# Patient Record
Sex: MALE | Race: WHITE | NOT HISPANIC OR LATINO
[De-identification: names, ages, dates, MRNs, and addresses within clinical notes are randomized per-mention and may not be internally consistent; named-entity substitution may affect disease eponyms.]

---

## 2017-01-12 ENCOUNTER — APPOINTMENT (OUTPATIENT)
Dept: INTERNAL MEDICINE | Facility: CLINIC | Age: 62
End: 2017-01-12

## 2017-01-12 VITALS
HEIGHT: 70 IN | HEART RATE: 76 BPM | WEIGHT: 202 LBS | DIASTOLIC BLOOD PRESSURE: 80 MMHG | RESPIRATION RATE: 12 BRPM | BODY MASS INDEX: 28.92 KG/M2 | SYSTOLIC BLOOD PRESSURE: 125 MMHG

## 2017-01-12 DIAGNOSIS — M12.20 VILLONODULAR SYNOVITIS (PIGMENTED), UNSPECIFIED SITE: ICD-10-CM

## 2017-01-13 LAB
ALBUMIN SERPL ELPH-MCNC: 4.8 G/DL
ALP BLD-CCNC: 92 U/L
ALT SERPL-CCNC: 47 U/L
ANION GAP SERPL CALC-SCNC: 16 MMOL/L
APPEARANCE: CLEAR
AST SERPL-CCNC: 27 U/L
BACTERIA: NEGATIVE
BASOPHILS # BLD AUTO: 0.03 K/UL
BASOPHILS NFR BLD AUTO: 0.6 %
BILIRUB SERPL-MCNC: 0.4 MG/DL
BILIRUBIN URINE: NEGATIVE
BLOOD URINE: NEGATIVE
BUN SERPL-MCNC: 15 MG/DL
CALCIUM SERPL-MCNC: 11.9 MG/DL
CHLORIDE SERPL-SCNC: 101 MMOL/L
CHOLEST SERPL-MCNC: 180 MG/DL
CHOLEST/HDLC SERPL: 5.8 RATIO
CO2 SERPL-SCNC: 21 MMOL/L
COLOR: YELLOW
CREAT SERPL-MCNC: 1.05 MG/DL
EOSINOPHIL # BLD AUTO: 0.3 K/UL
EOSINOPHIL NFR BLD AUTO: 6 %
GLUCOSE QUALITATIVE U: NORMAL MG/DL
GLUCOSE SERPL-MCNC: 166 MG/DL
HBA1C MFR BLD HPLC: 7 %
HCT VFR BLD CALC: 43.6 %
HDLC SERPL-MCNC: 31 MG/DL
HGB BLD-MCNC: 15 G/DL
HYALINE CASTS: 1 /LPF
IMM GRANULOCYTES NFR BLD AUTO: 0 %
KETONES URINE: NEGATIVE
LDLC SERPL CALC-MCNC: NORMAL MG/DL
LEUKOCYTE ESTERASE URINE: NEGATIVE
LYMPHOCYTES # BLD AUTO: 1.27 K/UL
LYMPHOCYTES NFR BLD AUTO: 25.3 %
MAN DIFF?: NORMAL
MCHC RBC-ENTMCNC: 31.3 PG
MCHC RBC-ENTMCNC: 34.4 GM/DL
MCV RBC AUTO: 90.8 FL
MICROSCOPIC-UA: NORMAL
MONOCYTES # BLD AUTO: 0.43 K/UL
MONOCYTES NFR BLD AUTO: 8.6 %
NEUTROPHILS # BLD AUTO: 2.98 K/UL
NEUTROPHILS NFR BLD AUTO: 59.5 %
NITRITE URINE: NEGATIVE
PH URINE: 5
PLATELET # BLD AUTO: 224 K/UL
POTASSIUM SERPL-SCNC: 4.8 MMOL/L
PROT SERPL-MCNC: 7.3 G/DL
PROTEIN URINE: NEGATIVE MG/DL
PSA SERPL-MCNC: 1.07 NG/ML
RBC # BLD: 4.8 M/UL
RBC # FLD: 13.6 %
RED BLOOD CELLS URINE: 1 /HPF
SODIUM SERPL-SCNC: 138 MMOL/L
SPECIFIC GRAVITY URINE: 1.02
SQUAMOUS EPITHELIAL CELLS: 0 /HPF
TRIGL SERPL-MCNC: 424 MG/DL
URATE SERPL-MCNC: 6.1 MG/DL
UROBILINOGEN URINE: NORMAL MG/DL
WBC # FLD AUTO: 5.01 K/UL
WHITE BLOOD CELLS URINE: 1 /HPF

## 2017-01-19 ENCOUNTER — MESSAGE (OUTPATIENT)
Age: 62
End: 2017-01-19

## 2017-02-01 ENCOUNTER — RX RENEWAL (OUTPATIENT)
Age: 62
End: 2017-02-01

## 2017-02-14 ENCOUNTER — MEDICATION RENEWAL (OUTPATIENT)
Age: 62
End: 2017-02-14

## 2017-02-16 ENCOUNTER — RX RENEWAL (OUTPATIENT)
Age: 62
End: 2017-02-16

## 2017-02-27 ENCOUNTER — APPOINTMENT (OUTPATIENT)
Dept: INTERNAL MEDICINE | Facility: CLINIC | Age: 62
End: 2017-02-27

## 2017-02-27 ENCOUNTER — OUTPATIENT (OUTPATIENT)
Dept: OUTPATIENT SERVICES | Facility: HOSPITAL | Age: 62
LOS: 1 days | End: 2017-02-27
Payer: COMMERCIAL

## 2017-02-27 VITALS
RESPIRATION RATE: 16 BRPM | HEIGHT: 69 IN | HEART RATE: 88 BPM | DIASTOLIC BLOOD PRESSURE: 80 MMHG | TEMPERATURE: 98 F | WEIGHT: 199.96 LBS | SYSTOLIC BLOOD PRESSURE: 150 MMHG

## 2017-02-27 VITALS
HEIGHT: 70 IN | DIASTOLIC BLOOD PRESSURE: 84 MMHG | RESPIRATION RATE: 12 BRPM | SYSTOLIC BLOOD PRESSURE: 138 MMHG | HEART RATE: 72 BPM | BODY MASS INDEX: 28.92 KG/M2 | WEIGHT: 202 LBS

## 2017-02-27 DIAGNOSIS — Z98.89 OTHER SPECIFIED POSTPROCEDURAL STATES: Chronic | ICD-10-CM

## 2017-02-27 DIAGNOSIS — E21.3 HYPERPARATHYROIDISM, UNSPECIFIED: ICD-10-CM

## 2017-02-27 DIAGNOSIS — E21.0 PRIMARY HYPERPARATHYROIDISM: ICD-10-CM

## 2017-02-27 DIAGNOSIS — D36.10 BENIGN NEOPLASM OF PERIPHERAL NERVES AND AUTONOMIC NERVOUS SYSTEM, UNSPECIFIED: ICD-10-CM

## 2017-02-27 LAB
BUN SERPL-MCNC: 20 MG/DL — SIGNIFICANT CHANGE UP (ref 7–23)
CALCIUM SERPL-MCNC: 11.2 MG/DL — HIGH (ref 8.4–10.5)
CHLORIDE SERPL-SCNC: 99 MMOL/L — SIGNIFICANT CHANGE UP (ref 98–107)
CO2 SERPL-SCNC: 22 MMOL/L — SIGNIFICANT CHANGE UP (ref 22–31)
CREAT SERPL-MCNC: 1.17 MG/DL — SIGNIFICANT CHANGE UP (ref 0.5–1.3)
GLUCOSE SERPL-MCNC: 202 MG/DL — HIGH (ref 70–99)
HCT VFR BLD CALC: 39.7 % — SIGNIFICANT CHANGE UP (ref 39–50)
HGB BLD-MCNC: 14.1 G/DL — SIGNIFICANT CHANGE UP (ref 13–17)
MCHC RBC-ENTMCNC: 30.8 PG — SIGNIFICANT CHANGE UP (ref 27–34)
MCHC RBC-ENTMCNC: 35.5 % — SIGNIFICANT CHANGE UP (ref 32–36)
MCV RBC AUTO: 86.7 FL — SIGNIFICANT CHANGE UP (ref 80–100)
PLATELET # BLD AUTO: 220 K/UL — SIGNIFICANT CHANGE UP (ref 150–400)
PMV BLD: 10.6 FL — SIGNIFICANT CHANGE UP (ref 7–13)
POTASSIUM SERPL-MCNC: 3.8 MMOL/L — SIGNIFICANT CHANGE UP (ref 3.5–5.3)
POTASSIUM SERPL-SCNC: 3.8 MMOL/L — SIGNIFICANT CHANGE UP (ref 3.5–5.3)
RBC # BLD: 4.58 M/UL — SIGNIFICANT CHANGE UP (ref 4.2–5.8)
RBC # FLD: 12.7 % — SIGNIFICANT CHANGE UP (ref 10.3–14.5)
SODIUM SERPL-SCNC: 137 MMOL/L — SIGNIFICANT CHANGE UP (ref 135–145)
WBC # BLD: 4.8 K/UL — SIGNIFICANT CHANGE UP (ref 3.8–10.5)
WBC # FLD AUTO: 4.8 K/UL — SIGNIFICANT CHANGE UP (ref 3.8–10.5)

## 2017-02-27 PROCEDURE — 93010 ELECTROCARDIOGRAM REPORT: CPT

## 2017-02-27 NOTE — H&P PST ADULT - PROBLEM SELECTOR PLAN 1
Parathyroidectomy 3/6/17.    CBC BMP EKG    Pt was seen for PMD clearance, requested on chart.    MONICA precautions, OR booking informed

## 2017-02-27 NOTE — H&P PST ADULT - HISTORY OF PRESENT ILLNESS
Pt. is a 62 yo male that had elevated serum calcium which has increased, dx with hyperparathyroidism. Pt now scheduled for parathyroidectomy 3/6/17.  Surgery was previously scheduled for Oct 2016, then cancelled as mass discovered on imaging left neck.  Pt reports he was x with Schwannoma of vagus nerve which is being followed. Pt now scheduled for parathyroidectomy 3/6/17. Pt. is a 62 yo male that had elevated serum calcium which has increased, dx with hyperparathyroidism. Pt now scheduled for parathyroidectomy 3/6/17.      Surgery was previously scheduled for Oct 2016, then cancelled as mass discovered on imaging left neck.  Pt reports he was dx with Schwannoma of vagus nerve which is being followed. Pt now scheduled for parathyroidectomy 3/6/17.

## 2017-02-27 NOTE — H&P PST ADULT - NEUROLOGICAL SYMPTOMS
numbness all 10 toes since 10/2016/paresthesias numbness all 10 toes since 10/2016.  Denies numbness fingers bilateral hands/paresthesias

## 2017-02-27 NOTE — H&P PST ADULT - NSANTHOSAYNRD_GEN_A_CORE
No. MONICA screening performed.  STOP BANG Legend: 0-2 = LOW Risk; 3-4 = INTERMEDIATE Risk; 5-8 = HIGH Risk

## 2017-02-27 NOTE — H&P PST ADULT - PMH
Gout    Hyperlipidemia    Hyperparathyroidism    Hypertension Gout    Hyperlipidemia    Hyperparathyroidism    Hypertension    Schwannoma  of vagus nerve

## 2017-02-27 NOTE — H&P PST ADULT - NEUROLOGICAL COMMENTS
mass discovered on imaging left neck 10/2017.  Pt reports he was x with Schwannoma of vagus nerve which is being followed. Pt now scheduled for parathyroidectomy 3/6/17. mass discovered on imaging left neck 10/2017.  Pt reports he was x with Schwannoma of vagus nerve which is being followed. Pt now scheduled for parathyroidectomy 3/6/17..

## 2017-03-02 ENCOUNTER — RESULT REVIEW (OUTPATIENT)
Age: 62
End: 2017-03-02

## 2017-03-05 ENCOUNTER — RESULT REVIEW (OUTPATIENT)
Age: 62
End: 2017-03-05

## 2017-03-06 ENCOUNTER — TRANSCRIPTION ENCOUNTER (OUTPATIENT)
Age: 62
End: 2017-03-06

## 2017-03-06 ENCOUNTER — APPOINTMENT (OUTPATIENT)
Dept: SURGERY | Facility: HOSPITAL | Age: 62
End: 2017-03-06

## 2017-03-06 ENCOUNTER — OUTPATIENT (OUTPATIENT)
Dept: OUTPATIENT SERVICES | Facility: HOSPITAL | Age: 62
LOS: 1 days | Discharge: HOME | End: 2017-03-06
Payer: COMMERCIAL

## 2017-03-06 ENCOUNTER — OTHER (OUTPATIENT)
Age: 62
End: 2017-03-06

## 2017-03-06 VITALS
HEIGHT: 70 IN | TEMPERATURE: 98 F | OXYGEN SATURATION: 98 % | DIASTOLIC BLOOD PRESSURE: 81 MMHG | RESPIRATION RATE: 14 BRPM | HEART RATE: 94 BPM | WEIGHT: 199.96 LBS | SYSTOLIC BLOOD PRESSURE: 139 MMHG

## 2017-03-06 VITALS
SYSTOLIC BLOOD PRESSURE: 142 MMHG | HEART RATE: 88 BPM | OXYGEN SATURATION: 100 % | TEMPERATURE: 98 F | DIASTOLIC BLOOD PRESSURE: 82 MMHG | RESPIRATION RATE: 15 BRPM

## 2017-03-06 DIAGNOSIS — Z98.89 OTHER SPECIFIED POSTPROCEDURAL STATES: Chronic | ICD-10-CM

## 2017-03-06 DIAGNOSIS — E21.0 PRIMARY HYPERPARATHYROIDISM: ICD-10-CM

## 2017-03-06 PROCEDURE — 60500 EXPLORE PARATHYROID GLANDS: CPT

## 2017-03-06 PROCEDURE — 60520 REMOVAL OF THYMUS GLAND: CPT | Mod: 59

## 2017-03-06 PROCEDURE — 88331 PATH CONSLTJ SURG 1 BLK 1SPC: CPT | Mod: 26

## 2017-03-06 PROCEDURE — 13132 CMPLX RPR F/C/C/M/N/AX/G/H/F: CPT | Mod: 59

## 2017-03-06 PROCEDURE — 88305 TISSUE EXAM BY PATHOLOGIST: CPT | Mod: 26

## 2017-03-06 PROCEDURE — 88307 TISSUE EXAM BY PATHOLOGIST: CPT | Mod: 26

## 2017-03-06 RX ORDER — ACETAMINOPHEN 500 MG
650 TABLET ORAL EVERY 6 HOURS
Qty: 0 | Refills: 0 | Status: DISCONTINUED | OUTPATIENT
Start: 2017-03-06 | End: 2017-03-07

## 2017-03-06 RX ORDER — SODIUM CHLORIDE 9 MG/ML
1000 INJECTION, SOLUTION INTRAVENOUS
Qty: 0 | Refills: 0 | Status: DISCONTINUED | OUTPATIENT
Start: 2017-03-06 | End: 2017-03-07

## 2017-03-06 RX ORDER — CALCIUM CARBONATE 500(1250)
1 TABLET ORAL
Qty: 0 | Refills: 0 | Status: DISCONTINUED | OUTPATIENT
Start: 2017-03-06 | End: 2017-03-07

## 2017-03-06 RX ORDER — ACETAMINOPHEN 500 MG
2 TABLET ORAL
Qty: 0 | Refills: 0 | COMMUNITY
Start: 2017-03-06

## 2017-03-06 RX ORDER — CALCIUM CARBONATE 500(1250)
1 TABLET ORAL
Qty: 0 | Refills: 0 | COMMUNITY
Start: 2017-03-06

## 2017-03-06 RX ADMIN — Medication 1 TABLET(S): at 16:55

## 2017-03-06 RX ADMIN — SODIUM CHLORIDE 50 MILLILITER(S): 9 INJECTION, SOLUTION INTRAVENOUS at 16:55

## 2017-03-06 NOTE — ASU DISCHARGE PLAN (ADULT/PEDIATRIC). - INSTRUCTIONS
Start with clear liquids and gradually increase your diet as you can, until you return to your normal diet. Call your surgeon's office later today or tomorrow to schedule a follow up appointment.

## 2017-03-06 NOTE — ASU DISCHARGE PLAN (ADULT/PEDIATRIC). - NOTIFY
Pain not relieved by Medications/Inability to Tolerate Liquids or Foods/Fever greater than 101/Swelling that continues/Bleeding that does not stop/Unable to Urinate/Numbness, tingling/Persistent Nausea and Vomiting

## 2017-03-06 NOTE — ASU DISCHARGE PLAN (ADULT/PEDIATRIC). - MEDICATION SUMMARY - MEDICATIONS TO TAKE
I will START or STAY ON the medications listed below when I get home from the hospital:    acetaminophen 325 mg oral tablet  -- 2 tab(s) by mouth every 6 hours, As needed, Moderate Pain (4 - 6)  -- Indication: For Primary hyperparathyroidism    irbesartan 300 mg oral tablet  --  by mouth once a day (at bedtime)  -- Indication: For Primary hyperparathyroidism    calcium carbonate 500 mg (200 mg elemental calcium) oral tablet, chewable  -- 1 tab(s) by mouth 4 times a day  -- Indication: For Primary hyperparathyroidism    allopurinol 300 mg oral tablet  --  by mouth once a day (at bedtime)  -- Indication: For Primary hyperparathyroidism    Crestor 20 mg oral tablet  -- 1 tab(s) by mouth once a day (at bedtime)  -- Indication: For Primary hyperparathyroidism    Vitamin D3  -- 7000 international unit(s) by mouth 2 times a day  -- Indication: For Primary hyperparathyroidism

## 2017-03-06 NOTE — ASU DISCHARGE PLAN (ADULT/PEDIATRIC). - NURSING INSTRUCTIONS
See medication reconcilliation record  You were given IV Tylenol for pain management.  Please DO NOT take tylenol for the next 6-8 hours (until _7:45pm_ ). Please do not exceed 3000mg in 24hours.  You were given Cefazolin 2000mg in OR today about 1:[id number]

## 2017-03-06 NOTE — BRIEF OPERATIVE NOTE - PROCEDURE
Parathyroid hormone (PTH) antibody assay  03/06/2017    Active  KTORTORELL  Parathyroidectomy  03/06/2017    Active  KTORTORELL

## 2017-03-06 NOTE — ASU DISCHARGE PLAN (ADULT/PEDIATRIC). - COMMENTS
Surgical Unit will call you on the next business day to follow up. [facility] is open Monday - Friday.

## 2017-03-10 LAB — SURGICAL PATHOLOGY STUDY: SIGNIFICANT CHANGE UP

## 2017-03-21 ENCOUNTER — APPOINTMENT (OUTPATIENT)
Dept: SURGERY | Facility: CLINIC | Age: 62
End: 2017-03-21

## 2017-03-22 LAB
24R-OH-CALCIDIOL SERPL-MCNC: 71.3 PG/ML
CALCIUM SERPL-MCNC: 11 MG/DL
CALCIUM SERPL-MCNC: 11 MG/DL
PARATHYROID HORMONE INTACT: 75 PG/ML

## 2017-03-24 ENCOUNTER — OTHER (OUTPATIENT)
Age: 62
End: 2017-03-24

## 2017-05-01 ENCOUNTER — RX RENEWAL (OUTPATIENT)
Age: 62
End: 2017-05-01

## 2017-05-02 ENCOUNTER — APPOINTMENT (OUTPATIENT)
Dept: INTERNAL MEDICINE | Facility: CLINIC | Age: 62
End: 2017-05-02

## 2017-05-02 VITALS
SYSTOLIC BLOOD PRESSURE: 135 MMHG | BODY MASS INDEX: 29.2 KG/M2 | HEIGHT: 70 IN | HEART RATE: 92 BPM | WEIGHT: 204 LBS | DIASTOLIC BLOOD PRESSURE: 75 MMHG | OXYGEN SATURATION: 99 %

## 2017-05-02 DIAGNOSIS — R59.9 ENLARGED LYMPH NODES, UNSPECIFIED: ICD-10-CM

## 2017-05-02 DIAGNOSIS — Z87.898 PERSONAL HISTORY OF OTHER SPECIFIED CONDITIONS: ICD-10-CM

## 2017-05-02 DIAGNOSIS — Z01.818 ENCOUNTER FOR OTHER PREPROCEDURAL EXAMINATION: ICD-10-CM

## 2017-05-03 ENCOUNTER — RESULT REVIEW (OUTPATIENT)
Age: 62
End: 2017-05-03

## 2017-05-03 LAB
25(OH)D3 SERPL-MCNC: 18.8 NG/ML
ALBUMIN SERPL ELPH-MCNC: 4.7 G/DL
ALP BLD-CCNC: 87 U/L
ALT SERPL-CCNC: 34 U/L
ANION GAP SERPL CALC-SCNC: 20 MMOL/L
AST SERPL-CCNC: 21 U/L
BASOPHILS # BLD AUTO: 0.02 K/UL
BASOPHILS NFR BLD AUTO: 0.4 %
BILIRUB SERPL-MCNC: 0.4 MG/DL
BUN SERPL-MCNC: 19 MG/DL
CALCIUM SERPL-MCNC: 10.6 MG/DL
CHLORIDE SERPL-SCNC: 101 MMOL/L
CHOLEST SERPL-MCNC: 173 MG/DL
CHOLEST/HDLC SERPL: 5.1 RATIO
CO2 SERPL-SCNC: 17 MMOL/L
CREAT SERPL-MCNC: 1.08 MG/DL
EOSINOPHIL # BLD AUTO: 0.3 K/UL
EOSINOPHIL NFR BLD AUTO: 6.7 %
GLUCOSE SERPL-MCNC: 209 MG/DL
HBA1C MFR BLD HPLC: 7.3 %
HCT VFR BLD CALC: 41.1 %
HDLC SERPL-MCNC: 34 MG/DL
HGB BLD-MCNC: 14.2 G/DL
IMM GRANULOCYTES NFR BLD AUTO: 0.2 %
LDLC SERPL CALC-MCNC: 60 MG/DL
LYMPHOCYTES # BLD AUTO: 1.56 K/UL
LYMPHOCYTES NFR BLD AUTO: 35 %
MAN DIFF?: NORMAL
MCHC RBC-ENTMCNC: 29.8 PG
MCHC RBC-ENTMCNC: 34.5 GM/DL
MCV RBC AUTO: 86.2 FL
MONOCYTES # BLD AUTO: 0.32 K/UL
MONOCYTES NFR BLD AUTO: 7.2 %
NEUTROPHILS # BLD AUTO: 2.25 K/UL
NEUTROPHILS NFR BLD AUTO: 50.5 %
PLATELET # BLD AUTO: 232 K/UL
POTASSIUM SERPL-SCNC: 4.6 MMOL/L
PROT SERPL-MCNC: 7 G/DL
RBC # BLD: 4.77 M/UL
RBC # FLD: 12.8 %
SODIUM SERPL-SCNC: 138 MMOL/L
TRIGL SERPL-MCNC: 394 MG/DL
URATE SERPL-MCNC: 4.8 MG/DL
WBC # FLD AUTO: 4.46 K/UL

## 2017-05-24 ENCOUNTER — APPOINTMENT (OUTPATIENT)
Dept: GASTROENTEROLOGY | Facility: CLINIC | Age: 62
End: 2017-05-24

## 2017-05-24 VITALS
RESPIRATION RATE: 16 BRPM | HEIGHT: 70 IN | SYSTOLIC BLOOD PRESSURE: 149 MMHG | DIASTOLIC BLOOD PRESSURE: 92 MMHG | BODY MASS INDEX: 28.35 KG/M2 | WEIGHT: 198 LBS | HEART RATE: 88 BPM | OXYGEN SATURATION: 98 %

## 2017-05-24 DIAGNOSIS — N40.0 BENIGN PROSTATIC HYPERPLASIA WITHOUT LOWER URINARY TRACT SYMPMS: ICD-10-CM

## 2017-06-07 ENCOUNTER — OUTPATIENT (OUTPATIENT)
Dept: OUTPATIENT SERVICES | Facility: HOSPITAL | Age: 62
LOS: 1 days | End: 2017-06-07
Payer: COMMERCIAL

## 2017-06-07 DIAGNOSIS — Z98.89 OTHER SPECIFIED POSTPROCEDURAL STATES: Chronic | ICD-10-CM

## 2017-06-07 DIAGNOSIS — R13.10 DYSPHAGIA, UNSPECIFIED: ICD-10-CM

## 2017-06-07 PROCEDURE — 74220 X-RAY XM ESOPHAGUS 1CNTRST: CPT

## 2017-06-07 PROCEDURE — 74220 X-RAY XM ESOPHAGUS 1CNTRST: CPT | Mod: 26

## 2017-06-20 ENCOUNTER — OTHER (OUTPATIENT)
Age: 62
End: 2017-06-20

## 2017-06-20 ENCOUNTER — APPOINTMENT (OUTPATIENT)
Dept: SURGERY | Facility: CLINIC | Age: 62
End: 2017-06-20

## 2017-06-20 ENCOUNTER — OUTPATIENT (OUTPATIENT)
Dept: OUTPATIENT SERVICES | Facility: HOSPITAL | Age: 62
LOS: 1 days | End: 2017-06-20
Payer: COMMERCIAL

## 2017-06-20 ENCOUNTER — APPOINTMENT (OUTPATIENT)
Dept: GASTROENTEROLOGY | Facility: GI CENTER | Age: 62
End: 2017-06-20

## 2017-06-20 ENCOUNTER — RESULT REVIEW (OUTPATIENT)
Age: 62
End: 2017-06-20

## 2017-06-20 DIAGNOSIS — Z98.89 OTHER SPECIFIED POSTPROCEDURAL STATES: Chronic | ICD-10-CM

## 2017-06-20 DIAGNOSIS — K21.9 GASTRO-ESOPHAGEAL REFLUX DISEASE WITHOUT ESOPHAGITIS: ICD-10-CM

## 2017-06-20 PROCEDURE — 88342 IMHCHEM/IMCYTCHM 1ST ANTB: CPT | Mod: 26

## 2017-06-20 PROCEDURE — 88305 TISSUE EXAM BY PATHOLOGIST: CPT

## 2017-06-20 PROCEDURE — 88305 TISSUE EXAM BY PATHOLOGIST: CPT | Mod: 26

## 2017-06-20 PROCEDURE — 43239 EGD BIOPSY SINGLE/MULTIPLE: CPT

## 2017-06-20 PROCEDURE — 88342 IMHCHEM/IMCYTCHM 1ST ANTB: CPT

## 2017-06-20 PROCEDURE — 88341 IMHCHEM/IMCYTCHM EA ADD ANTB: CPT | Mod: 26

## 2017-06-21 LAB — SURGICAL PATHOLOGY FINAL REPORT - CH: SIGNIFICANT CHANGE UP

## 2017-07-28 ENCOUNTER — APPOINTMENT (OUTPATIENT)
Dept: CHRONIC DISEASE MANAGEMENT | Facility: CLINIC | Age: 62
End: 2017-07-28
Payer: COMMERCIAL

## 2017-07-28 VITALS — WEIGHT: 196 LBS | BODY MASS INDEX: 28.06 KG/M2 | HEIGHT: 70 IN

## 2017-07-28 PROCEDURE — G0108 DIAB MANAGE TRN  PER INDIV: CPT

## 2017-08-02 ENCOUNTER — RX RENEWAL (OUTPATIENT)
Age: 62
End: 2017-08-02

## 2017-08-22 ENCOUNTER — APPOINTMENT (OUTPATIENT)
Dept: INTERNAL MEDICINE | Facility: CLINIC | Age: 62
End: 2017-08-22
Payer: COMMERCIAL

## 2017-08-22 VITALS
HEART RATE: 96 BPM | WEIGHT: 194 LBS | HEIGHT: 70 IN | SYSTOLIC BLOOD PRESSURE: 130 MMHG | OXYGEN SATURATION: 98 % | DIASTOLIC BLOOD PRESSURE: 80 MMHG | BODY MASS INDEX: 27.77 KG/M2

## 2017-08-22 PROCEDURE — 99214 OFFICE O/P EST MOD 30 MIN: CPT | Mod: 25

## 2017-08-22 PROCEDURE — 36415 COLL VENOUS BLD VENIPUNCTURE: CPT

## 2017-08-22 RX ORDER — SODIUM SULFATE, POTASSIUM SULFATE, MAGNESIUM SULFATE 17.5; 3.13; 1.6 G/ML; G/ML; G/ML
17.5-3.13-1.6 SOLUTION, CONCENTRATE ORAL
Qty: 1 | Refills: 0 | Status: DISCONTINUED | COMMUNITY
Start: 2017-05-24 | End: 2017-08-22

## 2017-08-23 ENCOUNTER — OUTPATIENT (OUTPATIENT)
Dept: OUTPATIENT SERVICES | Facility: HOSPITAL | Age: 62
LOS: 1 days | End: 2017-08-23
Payer: COMMERCIAL

## 2017-08-23 ENCOUNTER — RESULT REVIEW (OUTPATIENT)
Age: 62
End: 2017-08-23

## 2017-08-23 ENCOUNTER — APPOINTMENT (OUTPATIENT)
Dept: GASTROENTEROLOGY | Facility: GI CENTER | Age: 62
End: 2017-08-23
Payer: COMMERCIAL

## 2017-08-23 DIAGNOSIS — Z98.89 OTHER SPECIFIED POSTPROCEDURAL STATES: Chronic | ICD-10-CM

## 2017-08-23 DIAGNOSIS — Z12.11 ENCOUNTER FOR SCREENING FOR MALIGNANT NEOPLASM OF COLON: ICD-10-CM

## 2017-08-23 DIAGNOSIS — Z86.010 PERSONAL HISTORY OF COLONIC POLYPS: ICD-10-CM

## 2017-08-23 LAB
25(OH)D3 SERPL-MCNC: 19.1 NG/ML
ALBUMIN SERPL ELPH-MCNC: 4.8 G/DL
ALP BLD-CCNC: 80 U/L
ALT SERPL-CCNC: 27 U/L
ANION GAP SERPL CALC-SCNC: 16 MMOL/L
AST SERPL-CCNC: 20 U/L
BASOPHILS # BLD AUTO: 0.02 K/UL
BASOPHILS NFR BLD AUTO: 0.4 %
BILIRUB SERPL-MCNC: 0.4 MG/DL
BUN SERPL-MCNC: 18 MG/DL
CALCIUM SERPL-MCNC: 11.3 MG/DL
CHLORIDE SERPL-SCNC: 100 MMOL/L
CHOLEST SERPL-MCNC: 159 MG/DL
CHOLEST/HDLC SERPL: 5.1 RATIO
CO2 SERPL-SCNC: 21 MMOL/L
CREAT SERPL-MCNC: 1.09 MG/DL
EOSINOPHIL # BLD AUTO: 0.2 K/UL
EOSINOPHIL NFR BLD AUTO: 3.8 %
GLUCOSE SERPL-MCNC: 170 MG/DL
HBA1C MFR BLD HPLC: 7.5 %
HCT VFR BLD CALC: 43.1 %
HDLC SERPL-MCNC: 31 MG/DL
HGB BLD-MCNC: 14.8 G/DL
IMM GRANULOCYTES NFR BLD AUTO: 0.2 %
LDLC SERPL CALC-MCNC: NORMAL
LYMPHOCYTES # BLD AUTO: 1.2 K/UL
LYMPHOCYTES NFR BLD AUTO: 22.9 %
MAN DIFF?: NORMAL
MCHC RBC-ENTMCNC: 30.5 PG
MCHC RBC-ENTMCNC: 34.3 GM/DL
MCV RBC AUTO: 88.7 FL
MONOCYTES # BLD AUTO: 0.37 K/UL
MONOCYTES NFR BLD AUTO: 7.1 %
NEUTROPHILS # BLD AUTO: 3.43 K/UL
NEUTROPHILS NFR BLD AUTO: 65.6 %
PLATELET # BLD AUTO: 216 K/UL
POTASSIUM SERPL-SCNC: 4.3 MMOL/L
PROT SERPL-MCNC: 7.3 G/DL
RBC # BLD: 4.86 M/UL
RBC # FLD: 13.1 %
SODIUM SERPL-SCNC: 137 MMOL/L
TRIGL SERPL-MCNC: 414 MG/DL
URATE SERPL-MCNC: 5.4 MG/DL
WBC # FLD AUTO: 5.23 K/UL

## 2017-08-23 PROCEDURE — 45378 DIAGNOSTIC COLONOSCOPY: CPT | Mod: 33

## 2017-08-23 PROCEDURE — G0105: CPT

## 2017-10-30 ENCOUNTER — RX RENEWAL (OUTPATIENT)
Age: 62
End: 2017-10-30

## 2017-11-09 ENCOUNTER — APPOINTMENT (OUTPATIENT)
Dept: SURGERY | Facility: CLINIC | Age: 62
End: 2017-11-09
Payer: COMMERCIAL

## 2017-11-09 PROCEDURE — 99213 OFFICE O/P EST LOW 20 MIN: CPT | Mod: 25

## 2017-11-09 PROCEDURE — 36415 COLL VENOUS BLD VENIPUNCTURE: CPT

## 2017-11-10 LAB
24R-OH-CALCIDIOL SERPL-MCNC: 41 PG/ML
CALCIUM SERPL-MCNC: 11.7 MG/DL
CALCIUM SERPL-MCNC: 11.7 MG/DL
PARATHYROID HORMONE INTACT: 61 PG/ML

## 2017-11-27 ENCOUNTER — RX RENEWAL (OUTPATIENT)
Age: 62
End: 2017-11-27

## 2017-11-27 ENCOUNTER — FORM ENCOUNTER (OUTPATIENT)
Age: 62
End: 2017-11-27

## 2017-11-28 ENCOUNTER — OUTPATIENT (OUTPATIENT)
Dept: OUTPATIENT SERVICES | Facility: HOSPITAL | Age: 62
LOS: 1 days | End: 2017-11-28
Payer: COMMERCIAL

## 2017-11-28 ENCOUNTER — APPOINTMENT (OUTPATIENT)
Dept: ULTRASOUND IMAGING | Facility: CLINIC | Age: 62
End: 2017-11-28
Payer: COMMERCIAL

## 2017-11-28 DIAGNOSIS — Z98.89 OTHER SPECIFIED POSTPROCEDURAL STATES: Chronic | ICD-10-CM

## 2017-11-28 DIAGNOSIS — Z00.8 ENCOUNTER FOR OTHER GENERAL EXAMINATION: ICD-10-CM

## 2017-11-28 PROCEDURE — 76536 US EXAM OF HEAD AND NECK: CPT | Mod: 26

## 2017-11-28 PROCEDURE — 76536 US EXAM OF HEAD AND NECK: CPT

## 2017-12-05 ENCOUNTER — OTHER (OUTPATIENT)
Age: 62
End: 2017-12-05

## 2018-01-24 ENCOUNTER — FORM ENCOUNTER (OUTPATIENT)
Age: 63
End: 2018-01-24

## 2018-01-25 ENCOUNTER — OUTPATIENT (OUTPATIENT)
Dept: OUTPATIENT SERVICES | Facility: HOSPITAL | Age: 63
LOS: 1 days | End: 2018-01-25
Payer: COMMERCIAL

## 2018-01-25 ENCOUNTER — APPOINTMENT (OUTPATIENT)
Dept: CT IMAGING | Facility: IMAGING CENTER | Age: 63
End: 2018-01-25
Payer: COMMERCIAL

## 2018-01-25 DIAGNOSIS — Z98.89 OTHER SPECIFIED POSTPROCEDURAL STATES: Chronic | ICD-10-CM

## 2018-01-25 DIAGNOSIS — Z00.8 ENCOUNTER FOR OTHER GENERAL EXAMINATION: ICD-10-CM

## 2018-01-25 PROCEDURE — 70492 CT SFT TSUE NCK W/O & W/DYE: CPT

## 2018-01-25 PROCEDURE — 70491 CT SOFT TISSUE NECK W/DYE: CPT | Mod: 26

## 2018-01-25 PROCEDURE — 82565 ASSAY OF CREATININE: CPT

## 2018-01-29 ENCOUNTER — RX RENEWAL (OUTPATIENT)
Age: 63
End: 2018-01-29

## 2018-01-29 RX ORDER — LANCETS 28 GAUGE
EACH MISCELLANEOUS
Qty: 100 | Refills: 1 | Status: ACTIVE | COMMUNITY
Start: 2018-01-29

## 2018-01-29 RX ORDER — BLOOD-GLUCOSE METER
W/DEVICE EACH MISCELLANEOUS
Qty: 1 | Refills: 0 | Status: DISCONTINUED | COMMUNITY
Start: 2017-07-28 | End: 2018-01-29

## 2018-01-29 RX ORDER — LANCETS
EACH MISCELLANEOUS
Qty: 1 | Refills: 1 | Status: DISCONTINUED | COMMUNITY
Start: 2017-07-28 | End: 2018-01-29

## 2018-01-29 RX ORDER — BLOOD SUGAR DIAGNOSTIC
STRIP MISCELLANEOUS DAILY
Qty: 100 | Refills: 1 | Status: DISCONTINUED | COMMUNITY
Start: 2017-07-28 | End: 2018-01-29

## 2018-01-30 ENCOUNTER — OTHER (OUTPATIENT)
Age: 63
End: 2018-01-30

## 2018-01-30 ENCOUNTER — FORM ENCOUNTER (OUTPATIENT)
Age: 63
End: 2018-01-30

## 2018-01-31 ENCOUNTER — OUTPATIENT (OUTPATIENT)
Dept: OUTPATIENT SERVICES | Facility: HOSPITAL | Age: 63
LOS: 1 days | End: 2018-01-31
Payer: COMMERCIAL

## 2018-01-31 ENCOUNTER — APPOINTMENT (OUTPATIENT)
Dept: ULTRASOUND IMAGING | Facility: IMAGING CENTER | Age: 63
End: 2018-01-31
Payer: COMMERCIAL

## 2018-01-31 ENCOUNTER — APPOINTMENT (OUTPATIENT)
Dept: INTERNAL MEDICINE | Facility: CLINIC | Age: 63
End: 2018-01-31

## 2018-01-31 DIAGNOSIS — Z98.89 OTHER SPECIFIED POSTPROCEDURAL STATES: Chronic | ICD-10-CM

## 2018-01-31 DIAGNOSIS — Z00.8 ENCOUNTER FOR OTHER GENERAL EXAMINATION: ICD-10-CM

## 2018-01-31 PROCEDURE — 76536 US EXAM OF HEAD AND NECK: CPT | Mod: 26

## 2018-01-31 PROCEDURE — 76536 US EXAM OF HEAD AND NECK: CPT

## 2018-02-10 ENCOUNTER — FORM ENCOUNTER (OUTPATIENT)
Age: 63
End: 2018-02-10

## 2018-02-11 ENCOUNTER — OUTPATIENT (OUTPATIENT)
Dept: OUTPATIENT SERVICES | Facility: HOSPITAL | Age: 63
LOS: 1 days | End: 2018-02-11
Payer: COMMERCIAL

## 2018-02-11 ENCOUNTER — APPOINTMENT (OUTPATIENT)
Dept: NUCLEAR MEDICINE | Facility: IMAGING CENTER | Age: 63
End: 2018-02-11
Payer: COMMERCIAL

## 2018-02-11 DIAGNOSIS — Z98.89 OTHER SPECIFIED POSTPROCEDURAL STATES: Chronic | ICD-10-CM

## 2018-02-11 DIAGNOSIS — Z00.8 ENCOUNTER FOR OTHER GENERAL EXAMINATION: ICD-10-CM

## 2018-02-11 PROCEDURE — 78072 PARATHYRD PLANAR W/SPECT&CT: CPT

## 2018-02-11 PROCEDURE — A9512: CPT

## 2018-02-11 PROCEDURE — A9500: CPT

## 2018-02-11 PROCEDURE — 78072 PARATHYRD PLANAR W/SPECT&CT: CPT | Mod: 26

## 2018-02-15 ENCOUNTER — OTHER (OUTPATIENT)
Age: 63
End: 2018-02-15

## 2018-04-25 ENCOUNTER — APPOINTMENT (OUTPATIENT)
Dept: INTERNAL MEDICINE | Facility: CLINIC | Age: 63
End: 2018-04-25
Payer: COMMERCIAL

## 2018-04-25 ENCOUNTER — NON-APPOINTMENT (OUTPATIENT)
Age: 63
End: 2018-04-25

## 2018-04-25 VITALS
WEIGHT: 203 LBS | HEIGHT: 70 IN | SYSTOLIC BLOOD PRESSURE: 123 MMHG | BODY MASS INDEX: 29.06 KG/M2 | DIASTOLIC BLOOD PRESSURE: 70 MMHG | HEART RATE: 72 BPM | RESPIRATION RATE: 12 BRPM

## 2018-04-25 PROCEDURE — 36415 COLL VENOUS BLD VENIPUNCTURE: CPT

## 2018-04-25 PROCEDURE — 99396 PREV VISIT EST AGE 40-64: CPT | Mod: 25

## 2018-04-25 PROCEDURE — 93000 ELECTROCARDIOGRAM COMPLETE: CPT

## 2018-04-27 LAB
25(OH)D3 SERPL-MCNC: 15.5 NG/ML
ALBUMIN SERPL ELPH-MCNC: 4.8 G/DL
ALP BLD-CCNC: 78 U/L
ALT SERPL-CCNC: 37 U/L
ANION GAP SERPL CALC-SCNC: 17 MMOL/L
APPEARANCE: CLEAR
AST SERPL-CCNC: 23 U/L
BACTERIA: NEGATIVE
BASOPHILS # BLD AUTO: 0.03 K/UL
BASOPHILS NFR BLD AUTO: 0.6 %
BILIRUB SERPL-MCNC: 0.4 MG/DL
BILIRUBIN URINE: NEGATIVE
BLOOD URINE: NEGATIVE
BUN SERPL-MCNC: 23 MG/DL
CALCIUM SERPL-MCNC: 11.5 MG/DL
CALCIUM SERPL-MCNC: 11.5 MG/DL
CHLORIDE SERPL-SCNC: 99 MMOL/L
CHOLEST SERPL-MCNC: 187 MG/DL
CHOLEST/HDLC SERPL: 6.7 RATIO
CO2 SERPL-SCNC: 22 MMOL/L
COLOR: YELLOW
CREAT SERPL-MCNC: 1.25 MG/DL
CREAT SPEC-SCNC: 156 MG/DL
EOSINOPHIL # BLD AUTO: 0.26 K/UL
EOSINOPHIL NFR BLD AUTO: 4.9 %
GLUCOSE QUALITATIVE U: 1000 MG/DL
GLUCOSE SERPL-MCNC: 232 MG/DL
HBA1C MFR BLD HPLC: 9.7 %
HCT VFR BLD CALC: 40.8 %
HDLC SERPL-MCNC: 28 MG/DL
HGB BLD-MCNC: 14.7 G/DL
HYALINE CASTS: 2 /LPF
IMM GRANULOCYTES NFR BLD AUTO: 0.2 %
KETONES URINE: NEGATIVE
LDLC SERPL CALC-MCNC: NORMAL
LEUKOCYTE ESTERASE URINE: NEGATIVE
LYMPHOCYTES # BLD AUTO: 1.9 K/UL
LYMPHOCYTES NFR BLD AUTO: 35.9 %
MAGNESIUM SERPL-MCNC: 1.7 MG/DL
MAN DIFF?: NORMAL
MCHC RBC-ENTMCNC: 31.1 PG
MCHC RBC-ENTMCNC: 36 GM/DL
MCV RBC AUTO: 86.3 FL
MICROALBUMIN 24H UR DL<=1MG/L-MCNC: 2.6 MG/DL
MICROALBUMIN/CREAT 24H UR-RTO: 17 MG/G
MICROSCOPIC-UA: NORMAL
MONOCYTES # BLD AUTO: 0.32 K/UL
MONOCYTES NFR BLD AUTO: 6 %
NEUTROPHILS # BLD AUTO: 2.77 K/UL
NEUTROPHILS NFR BLD AUTO: 52.4 %
NITRITE URINE: NEGATIVE
PARATHYROID HORMONE INTACT: 68 PG/ML
PH URINE: 5
PLATELET # BLD AUTO: 220 K/UL
POTASSIUM SERPL-SCNC: 4.7 MMOL/L
PROT SERPL-MCNC: 7.4 G/DL
PROTEIN URINE: NEGATIVE MG/DL
RBC # BLD: 4.73 M/UL
RBC # FLD: 13 %
RED BLOOD CELLS URINE: 0 /HPF
SODIUM SERPL-SCNC: 138 MMOL/L
SPECIFIC GRAVITY URINE: 1.03
SQUAMOUS EPITHELIAL CELLS: 1 /HPF
TRIGL SERPL-MCNC: 541 MG/DL
TSH SERPL-ACNC: 1.31 UIU/ML
UROBILINOGEN URINE: NEGATIVE MG/DL
VIT B12 SERPL-MCNC: 397 PG/ML
WBC # FLD AUTO: 5.29 K/UL
WHITE BLOOD CELLS URINE: 2 /HPF

## 2018-05-01 ENCOUNTER — RESULT REVIEW (OUTPATIENT)
Age: 63
End: 2018-05-01

## 2018-05-03 ENCOUNTER — APPOINTMENT (OUTPATIENT)
Dept: CARDIOLOGY | Facility: CLINIC | Age: 63
End: 2018-05-03

## 2018-05-18 ENCOUNTER — TRANSCRIPTION ENCOUNTER (OUTPATIENT)
Age: 63
End: 2018-05-18

## 2018-05-29 ENCOUNTER — RX RENEWAL (OUTPATIENT)
Age: 63
End: 2018-05-29

## 2018-05-30 ENCOUNTER — RX RENEWAL (OUTPATIENT)
Age: 63
End: 2018-05-30

## 2018-05-30 RX ORDER — BLOOD SUGAR DIAGNOSTIC
STRIP MISCELLANEOUS
Qty: 200 | Refills: 1 | Status: ACTIVE | COMMUNITY
Start: 2018-05-30 | End: 1900-01-01

## 2018-05-30 RX ORDER — LANCETS
EACH MISCELLANEOUS
Qty: 200 | Refills: 1 | Status: ACTIVE | COMMUNITY
Start: 2018-05-30 | End: 1900-01-01

## 2018-05-31 ENCOUNTER — RX RENEWAL (OUTPATIENT)
Age: 63
End: 2018-05-31

## 2018-06-01 ENCOUNTER — RX RENEWAL (OUTPATIENT)
Age: 63
End: 2018-06-01

## 2018-06-14 ENCOUNTER — APPOINTMENT (OUTPATIENT)
Dept: SURGERY | Facility: CLINIC | Age: 63
End: 2018-06-14
Payer: COMMERCIAL

## 2018-06-14 PROCEDURE — 36415 COLL VENOUS BLD VENIPUNCTURE: CPT

## 2018-06-14 PROCEDURE — 99214 OFFICE O/P EST MOD 30 MIN: CPT

## 2018-06-15 LAB
24R-OH-CALCIDIOL SERPL-MCNC: 48.1 PG/ML
CALCIUM SERPL-MCNC: 10.8 MG/DL
CALCIUM SERPL-MCNC: 10.8 MG/DL
HBA1C MFR BLD HPLC: 8.6 %
PARATHYROID HORMONE INTACT: 54 PG/ML

## 2018-06-18 ENCOUNTER — RESULT REVIEW (OUTPATIENT)
Age: 63
End: 2018-06-18

## 2018-06-24 RX ORDER — CHOLECALCIFEROL (VITAMIN D3) 1250 MCG
1.25 MG CAPSULE ORAL
Qty: 12 | Refills: 0 | Status: DISCONTINUED | COMMUNITY
Start: 2018-04-27 | End: 2018-06-24

## 2018-06-25 ENCOUNTER — APPOINTMENT (OUTPATIENT)
Dept: INTERNAL MEDICINE | Facility: CLINIC | Age: 63
End: 2018-06-25
Payer: COMMERCIAL

## 2018-06-25 ENCOUNTER — RX RENEWAL (OUTPATIENT)
Age: 63
End: 2018-06-25

## 2018-06-25 VITALS
HEART RATE: 70 BPM | WEIGHT: 197 LBS | DIASTOLIC BLOOD PRESSURE: 80 MMHG | RESPIRATION RATE: 14 BRPM | SYSTOLIC BLOOD PRESSURE: 122 MMHG | BODY MASS INDEX: 28.2 KG/M2 | HEIGHT: 70 IN

## 2018-06-25 PROCEDURE — 36415 COLL VENOUS BLD VENIPUNCTURE: CPT

## 2018-06-25 PROCEDURE — 99214 OFFICE O/P EST MOD 30 MIN: CPT | Mod: 25

## 2018-06-26 LAB
ALBUMIN SERPL ELPH-MCNC: 4.9 G/DL
ALP BLD-CCNC: 70 U/L
ALT SERPL-CCNC: 28 U/L
ANION GAP SERPL CALC-SCNC: 15 MMOL/L
AST SERPL-CCNC: 21 U/L
BASOPHILS # BLD AUTO: 0.04 K/UL
BASOPHILS NFR BLD AUTO: 0.8 %
BILIRUB SERPL-MCNC: 0.3 MG/DL
BUN SERPL-MCNC: 26 MG/DL
CALCIUM SERPL-MCNC: 10.8 MG/DL
CHLORIDE SERPL-SCNC: 101 MMOL/L
CHOLEST SERPL-MCNC: 142 MG/DL
CHOLEST/HDLC SERPL: 4.9 RATIO
CO2 SERPL-SCNC: 22 MMOL/L
CREAT SERPL-MCNC: 1.18 MG/DL
EOSINOPHIL # BLD AUTO: 0.17 K/UL
EOSINOPHIL NFR BLD AUTO: 3.3 %
GLUCOSE SERPL-MCNC: 154 MG/DL
HCT VFR BLD CALC: 40.2 %
HDLC SERPL-MCNC: 29 MG/DL
HGB BLD-MCNC: 13.9 G/DL
IMM GRANULOCYTES NFR BLD AUTO: 0.2 %
LDLC SERPL CALC-MCNC: 34 MG/DL
LYMPHOCYTES # BLD AUTO: 1.25 K/UL
LYMPHOCYTES NFR BLD AUTO: 24.3 %
MAN DIFF?: NORMAL
MCHC RBC-ENTMCNC: 30.5 PG
MCHC RBC-ENTMCNC: 34.6 GM/DL
MCV RBC AUTO: 88.2 FL
MONOCYTES # BLD AUTO: 0.25 K/UL
MONOCYTES NFR BLD AUTO: 4.9 %
NEUTROPHILS # BLD AUTO: 3.43 K/UL
NEUTROPHILS NFR BLD AUTO: 66.5 %
PLATELET # BLD AUTO: 214 K/UL
POTASSIUM SERPL-SCNC: 4.2 MMOL/L
PROT SERPL-MCNC: 7.3 G/DL
RBC # BLD: 4.56 M/UL
RBC # FLD: 13.1 %
SODIUM SERPL-SCNC: 138 MMOL/L
TRIGL SERPL-MCNC: 393 MG/DL
URATE SERPL-MCNC: 5.5 MG/DL
WBC # FLD AUTO: 5.15 K/UL

## 2018-07-23 ENCOUNTER — RX RENEWAL (OUTPATIENT)
Age: 63
End: 2018-07-23

## 2018-08-06 ENCOUNTER — APPOINTMENT (OUTPATIENT)
Dept: INTERNAL MEDICINE | Facility: CLINIC | Age: 63
End: 2018-08-06
Payer: COMMERCIAL

## 2018-08-06 VITALS
DIASTOLIC BLOOD PRESSURE: 80 MMHG | RESPIRATION RATE: 13 BRPM | WEIGHT: 192 LBS | BODY MASS INDEX: 27.49 KG/M2 | SYSTOLIC BLOOD PRESSURE: 132 MMHG | HEART RATE: 80 BPM | HEIGHT: 70 IN

## 2018-08-06 PROCEDURE — 99214 OFFICE O/P EST MOD 30 MIN: CPT

## 2018-08-06 NOTE — PHYSICAL EXAM
[No Acute Distress] : no acute distress [Well-Appearing] : well-appearing [Normal Sclera/Conjunctiva] : normal sclera/conjunctiva [No JVD] : no jugular venous distention [No Respiratory Distress] : no respiratory distress  [Clear to Auscultation] : lungs were clear to auscultation bilaterally [Normal Rate] : normal rate  [Regular Rhythm] : with a regular rhythm [No Murmur] : no murmur heard [No Carotid Bruits] : no carotid bruits [No Edema] : there was no peripheral edema [Soft] : abdomen soft [Non Tender] : non-tender [No HSM] : no HSM [No Focal Deficits] : no focal deficits [Normal Affect] : the affect was normal

## 2018-08-09 PROBLEM — D36.10 BENIGN NEOPLASM OF PERIPHERAL NERVES AND AUTONOMIC NERVOUS SYSTEM, UNSPECIFIED: Chronic | Status: ACTIVE | Noted: 2017-02-27

## 2018-08-14 ENCOUNTER — OUTPATIENT (OUTPATIENT)
Dept: OUTPATIENT SERVICES | Facility: HOSPITAL | Age: 63
LOS: 1 days | End: 2018-08-14
Payer: COMMERCIAL

## 2018-08-14 VITALS
OXYGEN SATURATION: 99 % | HEART RATE: 75 BPM | SYSTOLIC BLOOD PRESSURE: 128 MMHG | HEIGHT: 68 IN | DIASTOLIC BLOOD PRESSURE: 80 MMHG | WEIGHT: 190.04 LBS | TEMPERATURE: 98 F | RESPIRATION RATE: 16 BRPM

## 2018-08-14 DIAGNOSIS — Z98.89 OTHER SPECIFIED POSTPROCEDURAL STATES: Chronic | ICD-10-CM

## 2018-08-14 DIAGNOSIS — E21.0 PRIMARY HYPERPARATHYROIDISM: ICD-10-CM

## 2018-08-14 DIAGNOSIS — E11.9 TYPE 2 DIABETES MELLITUS WITHOUT COMPLICATIONS: ICD-10-CM

## 2018-08-14 DIAGNOSIS — I10 ESSENTIAL (PRIMARY) HYPERTENSION: ICD-10-CM

## 2018-08-14 DIAGNOSIS — R06.83 SNORING: ICD-10-CM

## 2018-08-14 DIAGNOSIS — E89.2 POSTPROCEDURAL HYPOPARATHYROIDISM: Chronic | ICD-10-CM

## 2018-08-14 LAB
BUN SERPL-MCNC: 17 MG/DL — SIGNIFICANT CHANGE UP (ref 7–23)
CALCIUM SERPL-MCNC: 11.2 MG/DL — HIGH (ref 8.4–10.5)
CHLORIDE SERPL-SCNC: 99 MMOL/L — SIGNIFICANT CHANGE UP (ref 98–107)
CO2 SERPL-SCNC: 24 MMOL/L — SIGNIFICANT CHANGE UP (ref 22–31)
CREAT SERPL-MCNC: 1 MG/DL — SIGNIFICANT CHANGE UP (ref 0.5–1.3)
GLUCOSE SERPL-MCNC: 133 MG/DL — HIGH (ref 70–99)
HBA1C BLD-MCNC: 7.2 % — HIGH (ref 4–5.6)
HCT VFR BLD CALC: 40.4 % — SIGNIFICANT CHANGE UP (ref 39–50)
HGB BLD-MCNC: 14.2 G/DL — SIGNIFICANT CHANGE UP (ref 13–17)
MCHC RBC-ENTMCNC: 30 PG — SIGNIFICANT CHANGE UP (ref 27–34)
MCHC RBC-ENTMCNC: 35.1 % — SIGNIFICANT CHANGE UP (ref 32–36)
MCV RBC AUTO: 85.4 FL — SIGNIFICANT CHANGE UP (ref 80–100)
NRBC # FLD: 0 — SIGNIFICANT CHANGE UP
PLATELET # BLD AUTO: 224 K/UL — SIGNIFICANT CHANGE UP (ref 150–400)
PMV BLD: 10.1 FL — SIGNIFICANT CHANGE UP (ref 7–13)
POTASSIUM SERPL-MCNC: 4.2 MMOL/L — SIGNIFICANT CHANGE UP (ref 3.5–5.3)
POTASSIUM SERPL-SCNC: 4.2 MMOL/L — SIGNIFICANT CHANGE UP (ref 3.5–5.3)
RBC # BLD: 4.73 M/UL — SIGNIFICANT CHANGE UP (ref 4.2–5.8)
RBC # FLD: 12.4 % — SIGNIFICANT CHANGE UP (ref 10.3–14.5)
SODIUM SERPL-SCNC: 138 MMOL/L — SIGNIFICANT CHANGE UP (ref 135–145)
WBC # BLD: 4.15 K/UL — SIGNIFICANT CHANGE UP (ref 3.8–10.5)
WBC # FLD AUTO: 4.15 K/UL — SIGNIFICANT CHANGE UP (ref 3.8–10.5)

## 2018-08-14 PROCEDURE — 93010 ELECTROCARDIOGRAM REPORT: CPT

## 2018-08-14 NOTE — H&P PST ADULT - NSANTHOSAYNRD_GEN_A_CORE
never been tested/No. MONICA screening performed.  STOP BANG Legend: 0-2 = LOW Risk; 3-4 = INTERMEDIATE Risk; 5-8 = HIGH Risk

## 2018-08-14 NOTE — H&P PST ADULT - MALLAMPATI CLASS
Class III - visualization of the soft palate and the base of the uvula/on phonation Class II - visualization of the soft palate, fauces, and uvula/on phonation

## 2018-08-14 NOTE — H&P PST ADULT - RS GEN PE MLT RESP DETAILS PC
breath sounds equal/airway patent/no rales/no rhonchi/respirations non-labored/no wheezes/clear to auscultation bilaterally/good air movement

## 2018-08-14 NOTE — H&P PST ADULT - NEGATIVE ENMT SYMPTOMS
no throat pain/no vertigo/no sinus symptoms/no nasal congestion/no ear pain/no tinnitus/no hearing difficulty

## 2018-08-14 NOTE — H&P PST ADULT - FAMILY HISTORY
Father  Still living? No  Family history of non-Hodgkin's lymphoma, Age at diagnosis: Age Unknown Father  Still living? No  Family history of non-Hodgkin's lymphoma, Age at diagnosis: Age Unknown  Diabetes, Age at diagnosis: Age Unknown     Sibling  Still living? Unknown  Family history of breast cancer in sister, Age at diagnosis: Age Unknown

## 2018-08-14 NOTE — H&P PST ADULT - HISTORY OF PRESENT ILLNESS
63 year old male presents to PST for primary hyperparathyroidism.  At routine PCP, CA levels were elevated.  Pt was referred to Dr. Aranda. Pt scheduled parathyroidectomy with parathyroid hormone assay on 8/22/18. 63 year old male presents to PST for primary hyperparathyroidism.  At routine PCP, CA levels were elevated.  Pt was referred to Dr. Aranda. Pt scheduled for parathyroidectomy with parathyroid hormone assay on 8/22/18.

## 2018-08-14 NOTE — H&P PST ADULT - PMH
Diabetes  Type II, controlled, no medication  Gout    Hyperlipidemia    Hyperparathyroidism    Hypertension    Primary hyperparathyroidism    Schwannoma  of vagus nerve BPH (benign prostatic hyperplasia)    Diabetes  Type II, controlled, no medication  GERD (gastroesophageal reflux disease)    Gout    Hyperlipidemia    Hyperparathyroidism    Hypertension    Primary hyperparathyroidism    Right inguinal hernia    Schwannoma  of vagus nerve

## 2018-08-14 NOTE — H&P PST ADULT - NEUROLOGICAL DETAILS
alert and oriented x 3/cranial nerves intact/normal strength/responds to pain/responds to verbal commands

## 2018-08-14 NOTE — H&P PST ADULT - PSH
History of left inguinal hernia repair  2009  S/P Mohs surgery for basal cell carcinoma  2006  S/P subtotal parathyroidectomy  removal of one parathyroid Feb 2017

## 2018-08-14 NOTE — H&P PST ADULT - NEGATIVE CARDIOVASCULAR SYMPTOMS
no peripheral edema/no paroxysmal nocturnal dyspnea/no chest pain/no orthopnea/no palpitations/no dyspnea on exertion

## 2018-08-14 NOTE — H&P PST ADULT - PROBLEM SELECTOR PLAN 1
Pt scheduled for parathyroidectomy with parathyroid hormone assay on 8/22/18.  Pre-op instructions given, patient verbalized understanding.   Pepcid given to patient for GI prophylaxis.   Chlorohexidine wash provided, instructions given.     Medical clearance requested by surgeon, pt saw PCP 8/6, pending medical clearance.   Cardiac clearance requested by surgeon, pt has appt 8/15/18, pending cardiac evaluation. Pt scheduled for parathyroidectomy with parathyroid hormone assay on 8/22/18.  Pre-op instructions given, patient verbalized understanding.   Pepcid given to patient for GI prophylaxis.   Chlorohexidine wash provided, instructions given.     Medical clearance requested by surgeon, pt saw PCP 8/6.  Medical clearance in the chart.   Cardiac clearance requested by PCP, pt has appt 8/15/18 with Dr. Webster, pending cardiac evaluation.

## 2018-08-14 NOTE — H&P PST ADULT - NEGATIVE MUSCULOSKELETAL SYMPTOMS
no stiffness/no arm pain R/no arthralgia/no muscle cramps/no muscle weakness/no neck pain/no joint swelling/no arm pain L

## 2018-08-19 NOTE — ASSESSMENT
[Patient Optimized for Surgery] : Patient optimized for surgery [Cardiology consultation] : Cardiology consultation [Continue medications as is] : Continue current medications [FreeTextEntry4] : 62-year-old male currently medically stable with controlled hypertension and no evidence of cardiopulmonary issues.\par \par On evaluation April 2018 the patient was found to have ventricular bigeminy and hasn't seen cardiology therefore preoperative cardiac evaluation was done.\par Cardiology evaluation found no contraindication to planned procedure.\par \par Therefore patient currently medically stable with no contraindication to planned surgical procedure.

## 2018-08-19 NOTE — RESULTS/DATA
[] : results reviewed [de-identified] : WNL [de-identified] : WNL [de-identified] : NSR, JEWELL

## 2018-08-21 ENCOUNTER — TRANSCRIPTION ENCOUNTER (OUTPATIENT)
Age: 63
End: 2018-08-21

## 2018-08-22 ENCOUNTER — RESULT REVIEW (OUTPATIENT)
Age: 63
End: 2018-08-22

## 2018-08-22 ENCOUNTER — OTHER (OUTPATIENT)
Age: 63
End: 2018-08-22

## 2018-08-22 ENCOUNTER — APPOINTMENT (OUTPATIENT)
Dept: SURGERY | Facility: HOSPITAL | Age: 63
End: 2018-08-22

## 2018-08-22 ENCOUNTER — OUTPATIENT (OUTPATIENT)
Dept: OUTPATIENT SERVICES | Facility: HOSPITAL | Age: 63
LOS: 1 days | Discharge: HOME | End: 2018-08-22
Payer: COMMERCIAL

## 2018-08-22 VITALS
TEMPERATURE: 98 F | SYSTOLIC BLOOD PRESSURE: 126 MMHG | RESPIRATION RATE: 16 BRPM | OXYGEN SATURATION: 96 % | DIASTOLIC BLOOD PRESSURE: 71 MMHG | HEIGHT: 68 IN | WEIGHT: 190.04 LBS | HEART RATE: 95 BPM

## 2018-08-22 VITALS
RESPIRATION RATE: 18 BRPM | SYSTOLIC BLOOD PRESSURE: 116 MMHG | OXYGEN SATURATION: 98 % | HEART RATE: 72 BPM | DIASTOLIC BLOOD PRESSURE: 63 MMHG

## 2018-08-22 DIAGNOSIS — Z98.89 OTHER SPECIFIED POSTPROCEDURAL STATES: Chronic | ICD-10-CM

## 2018-08-22 DIAGNOSIS — E89.2 POSTPROCEDURAL HYPOPARATHYROIDISM: Chronic | ICD-10-CM

## 2018-08-22 DIAGNOSIS — E21.0 PRIMARY HYPERPARATHYROIDISM: ICD-10-CM

## 2018-08-22 LAB — GLUCOSE BLDC GLUCOMTR-MCNC: 184 MG/DL — HIGH (ref 70–99)

## 2018-08-22 PROCEDURE — 60520 REMOVAL OF THYMUS GLAND: CPT | Mod: 59

## 2018-08-22 PROCEDURE — 60502 RE-EXPLORE PARATHYROIDS: CPT

## 2018-08-22 PROCEDURE — 13132 CMPLX RPR F/C/C/M/N/AX/G/H/F: CPT | Mod: 59

## 2018-08-22 PROCEDURE — 38510 BIOPSY/REMOVAL LYMPH NODES: CPT | Mod: 59,RT

## 2018-08-22 PROCEDURE — 60502 RE-EXPLORE PARATHYROIDS: CPT | Mod: AS

## 2018-08-22 PROCEDURE — 88305 TISSUE EXAM BY PATHOLOGIST: CPT | Mod: 26

## 2018-08-22 RX ORDER — PANTOPRAZOLE SODIUM 20 MG/1
1 TABLET, DELAYED RELEASE ORAL
Qty: 0 | Refills: 0 | COMMUNITY

## 2018-08-22 RX ORDER — ACETAMINOPHEN 500 MG
650 TABLET ORAL EVERY 6 HOURS
Qty: 0 | Refills: 0 | Status: DISCONTINUED | OUTPATIENT
Start: 2018-08-22 | End: 2018-09-06

## 2018-08-22 RX ORDER — OXYCODONE AND ACETAMINOPHEN 5; 325 MG/1; MG/1
1 TABLET ORAL EVERY 4 HOURS
Qty: 0 | Refills: 0 | Status: DISCONTINUED | OUTPATIENT
Start: 2018-08-22 | End: 2018-08-22

## 2018-08-22 RX ORDER — ACETAMINOPHEN 500 MG
2 TABLET ORAL
Qty: 0 | Refills: 0 | COMMUNITY
Start: 2018-08-22

## 2018-08-22 RX ORDER — SODIUM CHLORIDE 9 MG/ML
1000 INJECTION, SOLUTION INTRAVENOUS
Qty: 0 | Refills: 0 | Status: DISCONTINUED | OUTPATIENT
Start: 2018-08-22 | End: 2018-09-06

## 2018-08-22 NOTE — ASU DISCHARGE PLAN (ADULT/PEDIATRIC). - NURSING INSTRUCTIONS
DO NOT take any Tylenol (Acetaminophen) or narcotics containing Tylenol until after 5 30 PM . You received Tylenol during your operation and it can cause damage to your liver if too much is taken within a 24 hour time period.   DO NOT TAKE MORE THAN 3000mg TYLENOL IN 24HRS.  Call MD for any neck swelling, any shortness of breath, or any redness/drainage from wound. Stay away from hot, spicy and jagged edged foods.  Call MD for any nasal tip, fingertip or extremity numbness/tingling.

## 2018-08-22 NOTE — ASU PATIENT PROFILE, ADULT - PMH
BPH (benign prostatic hyperplasia)    Diabetes  Type II, controlled, no medication  GERD (gastroesophageal reflux disease)    Gout    Hyperlipidemia    Hyperparathyroidism    Hypertension    Primary hyperparathyroidism    Right inguinal hernia    Schwannoma  of vagus nerve

## 2018-08-22 NOTE — BRIEF OPERATIVE NOTE - PROCEDURE
<<-----Click on this checkbox to enter Procedure Parathyroid gland surgery  08/22/2018    Active  LUKEL

## 2018-08-22 NOTE — ASU DISCHARGE PLAN (ADULT/PEDIATRIC). - MEDICATION SUMMARY - MEDICATIONS TO TAKE
I will START or STAY ON the medications listed below when I get home from the hospital:    acetaminophen 325 mg oral tablet  -- 2 tab(s) by mouth every 6 hours, As needed, Moderate Pain (4 - 6)  -- Indication: For Primary hyperparathyroidism    irbesartan 300 mg oral tablet  --  by mouth once a day (at bedtime)  -- Indication: For Primary hyperparathyroidism    allopurinol 300 mg oral tablet  --  by mouth once a day (at bedtime)  -- Indication: For Primary hyperparathyroidism    Crestor 20 mg oral tablet  -- 1 tab(s) by mouth once a day (at bedtime)  -- Indication: For Primary hyperparathyroidism    pantoprazole 40 mg oral delayed release tablet  -- 1 tab(s) by mouth once a day pm  -- Indication: For Primary hyperparathyroidism

## 2018-08-22 NOTE — ASU DISCHARGE PLAN (ADULT/PEDIATRIC). - NOTIFY
Bleeding that does not stop/Unable to Urinate/Pain not relieved by Medications/Swelling that continues/Fever greater than 101/Numbness, tingling/of nose lips or fingertips

## 2018-08-24 LAB — SURGICAL PATHOLOGY STUDY: SIGNIFICANT CHANGE UP

## 2018-09-04 ENCOUNTER — APPOINTMENT (OUTPATIENT)
Dept: SURGERY | Facility: CLINIC | Age: 63
End: 2018-09-04
Payer: COMMERCIAL

## 2018-09-04 PROBLEM — N40.0 BENIGN PROSTATIC HYPERPLASIA WITHOUT LOWER URINARY TRACT SYMPTOMS: Chronic | Status: ACTIVE | Noted: 2018-08-14

## 2018-09-04 PROBLEM — K21.9 GASTRO-ESOPHAGEAL REFLUX DISEASE WITHOUT ESOPHAGITIS: Chronic | Status: ACTIVE | Noted: 2018-08-14

## 2018-09-04 PROBLEM — E21.0 PRIMARY HYPERPARATHYROIDISM: Chronic | Status: ACTIVE | Noted: 2018-08-14

## 2018-09-04 PROBLEM — E11.9 TYPE 2 DIABETES MELLITUS WITHOUT COMPLICATIONS: Chronic | Status: ACTIVE | Noted: 2018-08-14

## 2018-09-04 PROBLEM — K40.90 UNILATERAL INGUINAL HERNIA, WITHOUT OBSTRUCTION OR GANGRENE, NOT SPECIFIED AS RECURRENT: Chronic | Status: ACTIVE | Noted: 2018-08-14

## 2018-09-04 PROCEDURE — 99024 POSTOP FOLLOW-UP VISIT: CPT

## 2018-09-04 PROCEDURE — 36415 COLL VENOUS BLD VENIPUNCTURE: CPT

## 2018-09-05 LAB — 24R-OH-CALCIDIOL SERPL-MCNC: 54.4 PG/ML

## 2018-09-06 LAB
CALCIUM SERPL-MCNC: 11.7 MG/DL
CALCIUM SERPL-MCNC: 11.7 MG/DL
PARATHYROID HORMONE INTACT: 76 PG/ML

## 2018-09-12 ENCOUNTER — OTHER (OUTPATIENT)
Age: 63
End: 2018-09-12

## 2018-10-10 ENCOUNTER — APPOINTMENT (OUTPATIENT)
Dept: INTERNAL MEDICINE | Facility: CLINIC | Age: 63
End: 2018-10-10
Payer: COMMERCIAL

## 2018-10-10 VITALS
RESPIRATION RATE: 13 BRPM | WEIGHT: 192 LBS | BODY MASS INDEX: 27.49 KG/M2 | DIASTOLIC BLOOD PRESSURE: 82 MMHG | SYSTOLIC BLOOD PRESSURE: 130 MMHG | HEART RATE: 50 BPM | HEIGHT: 70 IN

## 2018-10-10 DIAGNOSIS — S83.289A OTHER TEAR OF LATERAL MENISCUS, CURRENT INJURY, UNSPECIFIED KNEE, INITIAL ENCOUNTER: ICD-10-CM

## 2018-10-10 DIAGNOSIS — I49.9 CARDIAC ARRHYTHMIA, UNSPECIFIED: ICD-10-CM

## 2018-10-10 DIAGNOSIS — S83.249A OTHER TEAR OF MEDIAL MENISCUS, CURRENT INJURY, UNSPECIFIED KNEE, INITIAL ENCOUNTER: ICD-10-CM

## 2018-10-10 PROCEDURE — 99214 OFFICE O/P EST MOD 30 MIN: CPT | Mod: 25

## 2018-10-10 PROCEDURE — 36415 COLL VENOUS BLD VENIPUNCTURE: CPT

## 2018-10-10 PROCEDURE — 90662 IIV NO PRSV INCREASED AG IM: CPT

## 2018-10-10 PROCEDURE — G0008: CPT

## 2018-10-10 NOTE — PHYSICAL EXAM
[No Acute Distress] : no acute distress [Well Nourished] : well nourished [Well-Appearing] : well-appearing [Normal Sclera/Conjunctiva] : normal sclera/conjunctiva [No JVD] : no jugular venous distention [No Respiratory Distress] : no respiratory distress  [Clear to Auscultation] : lungs were clear to auscultation bilaterally [Normal Rate] : normal rate  [Regular Rhythm] : with a regular rhythm [Normal S1, S2] : normal S1 and S2 [No Edema] : there was no peripheral edema [Soft] : abdomen soft [Non Tender] : non-tender [No Focal Deficits] : no focal deficits [Normal Affect] : the affect was normal

## 2018-10-10 NOTE — HISTORY OF PRESENT ILLNESS
[FreeTextEntry1] : Followup hypertension, hyperlipidemia, type 2 diabetes and hyperparathyroidism. [de-identified] : 63-year-old male with hypertension for which he takes irbesartan, hyperlipidemia on simvastatin and relatively new diagnosis of type 2 diabetes presents for his three-month followup.\par \par Blood work done 3 months ago, showed hemoglobin A1c still increased at 8.6, and told patient he should be on medication.\par He declined, stating, that he'll improve his diet, which was not good with exercise. He, states she's done this and feels his control should be better.\par \par Also the patient is having continued issues with hypercalcemia despite having had parathyroid surgery in the past and he has been reevaluated by . \par He had a re re exploration 2 months ago, with no abnormal findings and states Dr. Aranda feels the source of parathyroid hormone may be outside of his parathyroid glands, potentially in the chest.\par The patient is currently declining any further workup or testing.\par The patient has improved his lifestyle with much improved diet and exercise with weight loss.\par He been checking his sugars he noticed his sugars are much improved.\par \par Patient also following up with cardiology concerning cardiac arrhythmia\par \par No complaints of chest pain, palpitations, shortness of breath or edema.

## 2018-10-10 NOTE — ASSESSMENT
[FreeTextEntry1] : 63-year-old male with controlled hypertension with continued hyperlipidemia especially hypertriglyceridemia and low HDL.\par Hopeful improvement with lifestyle changes the patient has made.\par \par Patient continues to desire to avoid medication therefore encouraged continued diet and exercise.\par \par Continued hyperparathyroidism with hypercalcemia. Calcium level, and PTH level will be checked with today's blood work for the\par \par Continue present medications.\par Continue diet and exercise\par \par High-dose influenza vaccine given left deltoid\par Follow up in 3 months and for preoperative evaluation

## 2018-10-12 ENCOUNTER — TRANSCRIPTION ENCOUNTER (OUTPATIENT)
Age: 63
End: 2018-10-12

## 2018-10-12 ENCOUNTER — RESULT REVIEW (OUTPATIENT)
Age: 63
End: 2018-10-12

## 2018-10-12 LAB
ALBUMIN SERPL ELPH-MCNC: 4.8 G/DL
ALP BLD-CCNC: 68 U/L
ALT SERPL-CCNC: 32 U/L
ANION GAP SERPL CALC-SCNC: 14 MMOL/L
AST SERPL-CCNC: 24 U/L
BASOPHILS # BLD AUTO: 0.02 K/UL
BASOPHILS NFR BLD AUTO: 0.5 %
BILIRUB SERPL-MCNC: 0.6 MG/DL
BUN SERPL-MCNC: 17 MG/DL
CALCIUM SERPL-MCNC: 10.8 MG/DL
CALCIUM SERPL-MCNC: 10.8 MG/DL
CHLORIDE SERPL-SCNC: 102 MMOL/L
CHOLEST SERPL-MCNC: 156 MG/DL
CHOLEST/HDLC SERPL: 4.3 RATIO
CO2 SERPL-SCNC: 21 MMOL/L
CREAT SERPL-MCNC: 1.17 MG/DL
EOSINOPHIL # BLD AUTO: 0.09 K/UL
EOSINOPHIL NFR BLD AUTO: 2 %
GLUCOSE SERPL-MCNC: 124 MG/DL
HBA1C MFR BLD HPLC: 7.5 %
HCT VFR BLD CALC: 41.5 %
HDLC SERPL-MCNC: 36 MG/DL
HGB BLD-MCNC: 13.7 G/DL
IMM GRANULOCYTES NFR BLD AUTO: 0.2 %
LDLC SERPL CALC-MCNC: 75 MG/DL
LYMPHOCYTES # BLD AUTO: 1.36 K/UL
LYMPHOCYTES NFR BLD AUTO: 30.8 %
MAN DIFF?: NORMAL
MCHC RBC-ENTMCNC: 29.6 PG
MCHC RBC-ENTMCNC: 33 GM/DL
MCV RBC AUTO: 89.6 FL
MONOCYTES # BLD AUTO: 0.36 K/UL
MONOCYTES NFR BLD AUTO: 8.1 %
NEUTROPHILS # BLD AUTO: 2.58 K/UL
NEUTROPHILS NFR BLD AUTO: 58.4 %
PARATHYROID HORMONE INTACT: 64 PG/ML
PLATELET # BLD AUTO: 199 K/UL
POTASSIUM SERPL-SCNC: 4.6 MMOL/L
PROT SERPL-MCNC: 7 G/DL
RBC # BLD: 4.63 M/UL
RBC # FLD: 13.8 %
SODIUM SERPL-SCNC: 137 MMOL/L
TRIGL SERPL-MCNC: 224 MG/DL
URATE SERPL-MCNC: 6.6 MG/DL
WBC # FLD AUTO: 4.42 K/UL

## 2018-11-05 ENCOUNTER — RX RENEWAL (OUTPATIENT)
Age: 63
End: 2018-11-05

## 2018-11-07 ENCOUNTER — APPOINTMENT (OUTPATIENT)
Dept: ENDOCRINOLOGY | Facility: CLINIC | Age: 63
End: 2018-11-07
Payer: COMMERCIAL

## 2018-11-07 VITALS
SYSTOLIC BLOOD PRESSURE: 130 MMHG | OXYGEN SATURATION: 98 % | DIASTOLIC BLOOD PRESSURE: 70 MMHG | WEIGHT: 191 LBS | HEART RATE: 82 BPM | BODY MASS INDEX: 27.35 KG/M2 | HEIGHT: 70 IN

## 2018-11-07 PROCEDURE — 99245 OFF/OP CONSLTJ NEW/EST HI 55: CPT

## 2018-11-26 ENCOUNTER — RX RENEWAL (OUTPATIENT)
Age: 63
End: 2018-11-26

## 2018-12-12 RX ORDER — CLOPIDOGREL BISULFATE 75 MG/1
75 TABLET, FILM COATED ORAL
Qty: 90 | Refills: 1 | Status: ACTIVE | COMMUNITY
Start: 2018-12-12

## 2019-01-16 ENCOUNTER — APPOINTMENT (OUTPATIENT)
Dept: INTERNAL MEDICINE | Facility: CLINIC | Age: 64
End: 2019-01-16
Payer: COMMERCIAL

## 2019-01-16 ENCOUNTER — LABORATORY RESULT (OUTPATIENT)
Age: 64
End: 2019-01-16

## 2019-01-16 VITALS
HEIGHT: 70 IN | HEART RATE: 63 BPM | SYSTOLIC BLOOD PRESSURE: 124 MMHG | WEIGHT: 193 LBS | BODY MASS INDEX: 27.63 KG/M2 | DIASTOLIC BLOOD PRESSURE: 72 MMHG | RESPIRATION RATE: 13 BRPM

## 2019-01-16 PROCEDURE — 99214 OFFICE O/P EST MOD 30 MIN: CPT | Mod: 25

## 2019-01-16 PROCEDURE — 36415 COLL VENOUS BLD VENIPUNCTURE: CPT

## 2019-01-16 RX ORDER — TICAGRELOR 90 MG/1
90 TABLET ORAL
Qty: 60 | Refills: 0 | Status: DISCONTINUED | COMMUNITY
Start: 2018-10-18 | End: 2019-01-16

## 2019-01-16 NOTE — HISTORY OF PRESENT ILLNESS
[FreeTextEntry1] : Followup hypertension, hyperlipidemia, type 2 diabetes and hyperparathyroidism.\par NEW CAD and arrythmia [de-identified] : 63-year-old male with hypertension for which he takes irbesartan, hyperlipidemia on simvastatin and relatively new diagnosis of type 2 diabetes presents for his three-month followup.\par \par October 2018 the patient had a cardiac catheterization which showed a 90% lesion of the right coronary artery which was stented and mild LAD/circumflex disease.\par The patient is on aspirin and Plavix. He had side effects with Brilinta.\par The patient's monitor also showed an extremely high TVC burden for which he is on metoprolol with much improvement.\par \par The patient denies any chest pain, palpitations or shortness of breath.\par \par Since his last visit he has seen endocrinology who has started him on the proper wall is taking.\par She is also monitoring his hyperparathyroidism.\par \par Also the patient is having continued issues with hypercalcemia despite having had parathyroid surgery in the past and he has been reevaluated by . \par He had a re-exploration 2 months ago, with no abnormal findings and states Dr. Aranda feels the source of parathyroid hormone may be outside of his parathyroid glands, potentially in the chest.\par The patient is currently declining any further workup or testing.\par \par The patient has improved his lifestyle with much improved diet and exercise with weight loss.\par He been checking his sugars he noticed his sugars are much improved.\par \par

## 2019-01-16 NOTE — ASSESSMENT
[FreeTextEntry1] : 63-year-old male with controlled hypertension with continued hyperlipidemia especially hypertriglyceridemia and low HDL.\par Hopeful improvement with lifestyle changes the patient has made.\par \par Type 2 diabetes with patient now on metformin with continued lifestyle changes.\par \par Patient with NEW diagnoses of October 2018. Also high burden PVCs now improved with beta blocker.Followup with cardiology as scheduled.\par \par Continued hyperparathyroidism with hypercalcemia. Calcium level, and PTH level will be checked with today's blood work for the\par \par Continue present medications.\par Continue diet and exercise\par \par High-dose influenza vaccine already received\par Follow up in 3 months

## 2019-01-18 ENCOUNTER — TRANSCRIPTION ENCOUNTER (OUTPATIENT)
Age: 64
End: 2019-01-18

## 2019-01-18 LAB
ALBUMIN SERPL ELPH-MCNC: 4.7 G/DL
ALP BLD-CCNC: 70 U/L
ALT SERPL-CCNC: 38 U/L
ANION GAP SERPL CALC-SCNC: 12 MMOL/L
AST SERPL-CCNC: 20 U/L
BASOPHILS # BLD AUTO: 0.02 K/UL
BASOPHILS NFR BLD AUTO: 0.4 %
BILIRUB SERPL-MCNC: 0.4 MG/DL
BUN SERPL-MCNC: 24 MG/DL
CALCIUM SERPL-MCNC: 11.2 MG/DL
CALCIUM SERPL-MCNC: 11.2 MG/DL
CHLORIDE SERPL-SCNC: 104 MMOL/L
CHOLEST SERPL-MCNC: 124 MG/DL
CHOLEST/HDLC SERPL: 4 RATIO
CO2 SERPL-SCNC: 21 MMOL/L
CREAT SERPL-MCNC: 1.01 MG/DL
EOSINOPHIL # BLD AUTO: 0.25 K/UL
EOSINOPHIL NFR BLD AUTO: 4.9 %
GLUCOSE SERPL-MCNC: 157 MG/DL
HCT VFR BLD CALC: 41.2 %
HDLC SERPL-MCNC: 31 MG/DL
HGB BLD-MCNC: 14.2 G/DL
IMM GRANULOCYTES NFR BLD AUTO: 0.4 %
LDLC SERPL CALC-MCNC: 42 MG/DL
LYMPHOCYTES # BLD AUTO: 1.4 K/UL
LYMPHOCYTES NFR BLD AUTO: 27.4 %
MAGNESIUM SERPL-MCNC: 1.6 MG/DL
MAN DIFF?: NORMAL
MCHC RBC-ENTMCNC: 30.5 PG
MCHC RBC-ENTMCNC: 34.5 GM/DL
MCV RBC AUTO: 88.6 FL
MONOCYTES # BLD AUTO: 0.39 K/UL
MONOCYTES NFR BLD AUTO: 7.6 %
NEUTROPHILS # BLD AUTO: 3.03 K/UL
NEUTROPHILS NFR BLD AUTO: 59.3 %
PARATHYROID HORMONE INTACT: 54 PG/ML
PLATELET # BLD AUTO: 211 K/UL
POTASSIUM SERPL-SCNC: 5 MMOL/L
PROT SERPL-MCNC: 7.1 G/DL
RBC # BLD: 4.65 M/UL
RBC # FLD: 13 %
SODIUM SERPL-SCNC: 137 MMOL/L
TRIGL SERPL-MCNC: 255 MG/DL
URATE SERPL-MCNC: 4.9 MG/DL
WBC # FLD AUTO: 5.11 K/UL

## 2019-01-23 ENCOUNTER — TRANSCRIPTION ENCOUNTER (OUTPATIENT)
Age: 64
End: 2019-01-23

## 2019-01-23 ENCOUNTER — RESULT REVIEW (OUTPATIENT)
Age: 64
End: 2019-01-23

## 2019-01-24 ENCOUNTER — RX RENEWAL (OUTPATIENT)
Age: 64
End: 2019-01-24

## 2019-02-11 ENCOUNTER — APPOINTMENT (OUTPATIENT)
Dept: INTERNAL MEDICINE | Facility: CLINIC | Age: 64
End: 2019-02-11
Payer: COMMERCIAL

## 2019-02-11 VITALS
HEIGHT: 70 IN | WEIGHT: 194 LBS | DIASTOLIC BLOOD PRESSURE: 82 MMHG | BODY MASS INDEX: 27.77 KG/M2 | RESPIRATION RATE: 12 BRPM | SYSTOLIC BLOOD PRESSURE: 134 MMHG | HEART RATE: 90 BPM | OXYGEN SATURATION: 98 %

## 2019-02-11 PROCEDURE — 99213 OFFICE O/P EST LOW 20 MIN: CPT | Mod: 25

## 2019-02-11 RX ORDER — DEXAMETHASONE SODIUM PHOSPHATE 10 MG/ML
10 INJECTION INTRAMUSCULAR; INTRAVENOUS
Refills: 0 | Status: COMPLETED | OUTPATIENT
Start: 2019-02-11

## 2019-02-11 RX ADMIN — DEXAMETHASONE SODIUM PHOSPHATE 0 MG/ML: 10 INJECTION INTRAMUSCULAR; INTRAVENOUS at 00:00

## 2019-02-14 NOTE — ASSESSMENT
[FreeTextEntry1] : Rash of questionable etiology, dexamethasone IM/prednisone 10 mg taper given (pt aware it can raise his sugar). Cool showers advised. Patient advised if symptoms persist or worsen then he will need to see dermatology or allergist for further management. Patient will call with progress and return to the office as scheduled

## 2019-02-14 NOTE — PHYSICAL EXAM
[No Acute Distress] : no acute distress [No Respiratory Distress] : no respiratory distress  [Clear to Auscultation] : lungs were clear to auscultation bilaterally [Normal Rate] : normal rate  [Regular Rhythm] : with a regular rhythm [Normal Affect] : the affect was normal [Normal Mood] : the mood was normal [Normal Sclera/Conjunctiva] : normal sclera/conjunctiva [PERRL] : pupils equal round and reactive to light [EOMI] : extraocular movements intact [Normal Outer Ear/Nose] : the outer ears and nose were normal in appearance [Normal Oropharynx] : the oropharynx was normal [Normal TMs] : both tympanic membranes were normal [Normal Nasal Mucosa] : the nasal mucosa was normal [Supple] : supple [de-identified] : +Hive like skin change along hairline, trunk and arms

## 2019-02-14 NOTE — HISTORY OF PRESENT ILLNESS
[FreeTextEntry8] : Patient presents complaining of rash for 3-4 days. Patient is unsure etiology and denies change in soaps /detergents or medication. Patient states" even my eyes are itchy". Patient describes the rash as itchy and bothersome. Patient denies dyspnea/tongue swelling or travel

## 2019-02-22 ENCOUNTER — RX RENEWAL (OUTPATIENT)
Age: 64
End: 2019-02-22

## 2019-02-22 ENCOUNTER — TRANSCRIPTION ENCOUNTER (OUTPATIENT)
Age: 64
End: 2019-02-22

## 2019-02-27 NOTE — ASU PREOP CHECKLIST - ANTIBIOTIC
CASE MANAGEMENT: REVIEW 



2/27/2019



SI: ENCEPHALOPATHY . ALOC

T 97.4  HR 90 RR 19 B/P 134/84 SATS 99% ON RA 

NO LABS TODAY 





IS: FEOSOL PO QD

LOPRESSOR PO QD

INSULIN ASPART SUBQ AC/HS 

SEROQUEL PO Q12H 



***MED/SURG STATUS***



DCP: PATIENT IS FROM [facility] n/a

## 2019-03-18 ENCOUNTER — TRANSCRIPTION ENCOUNTER (OUTPATIENT)
Age: 64
End: 2019-03-18

## 2019-03-18 ENCOUNTER — RX RENEWAL (OUTPATIENT)
Age: 64
End: 2019-03-18

## 2019-03-27 ENCOUNTER — APPOINTMENT (OUTPATIENT)
Dept: ENDOCRINOLOGY | Facility: CLINIC | Age: 64
End: 2019-03-27
Payer: COMMERCIAL

## 2019-03-27 VITALS
OXYGEN SATURATION: 98 % | HEART RATE: 64 BPM | HEIGHT: 70 IN | WEIGHT: 197 LBS | BODY MASS INDEX: 28.2 KG/M2 | SYSTOLIC BLOOD PRESSURE: 110 MMHG | DIASTOLIC BLOOD PRESSURE: 70 MMHG

## 2019-03-27 PROCEDURE — 99214 OFFICE O/P EST MOD 30 MIN: CPT

## 2019-03-27 NOTE — PHYSICAL EXAM
[Alert] : alert [No Acute Distress] : no acute distress [Well Nourished] : well nourished [Well Developed] : well developed [Normal Sclera/Conjunctiva] : normal sclera/conjunctiva [PERRL] : pupils equal, round and reactive to light [EOMI] : extra ocular movement intact [No Proptosis] : no proptosis [Normal Outer Ear/Nose] : the ears and nose were normal in appearance [Normal TMs] : both tympanic membranes were normal [Normal Hearing] : hearing was normal [No Neck Mass] : no neck mass was observed [Supple] : the neck was supple [Thyroid Not Enlarged] : the thyroid was not enlarged [No Respiratory Distress] : no respiratory distress [Normal Rate and Effort] : normal respiratory rhythm and effort [No Accessory Muscle Use] : no accessory muscle use [Clear to Auscultation] : lungs were clear to auscultation bilaterally [Normal Rate] : heart rate was normal  [Normal S1, S2] : normal S1 and S2 [Regular Rhythm] : with a regular rhythm [Normal Bowel Sounds] : normal bowel sounds [Not Tender] : non-tender [Soft] : abdomen soft [Not Distended] : not distended [Normal] : normal and non tender [No CVA Tenderness] : no ~M costovertebral angle tenderness [No Spinal Tenderness] : no spinal tenderness [Normal Gait] : normal gait [No Joint Swelling] : no joint swelling seen [No Clubbing, Cyanosis] : no clubbing  or cyanosis of the fingernails [Normal Strength/Tone] : muscle strength and tone were normal [No Rash] : no rash [No Skin Lesions] : no skin lesions [Normal Reflexes] : deep tendon reflexes were 2+ and symmetric [No Motor Deficits] : the motor exam was normal [No Tremors] : no tremors [Oriented x3] : oriented to person, place, and time [Normal Insight/Judgement] : insight and judgment were intact [Normal Affect] : the affect was normal [Normal Mood] : the mood was normal [Kyphosis] : no kyphosis present [Scoliosis] : scoliosis not present [Foot Ulcers] : no foot ulcers [Acne] : no acne [de-identified] : No ulcers, calluses or deformities B/L. Decreased monofilament under the right toe, otherwise normal

## 2019-03-27 NOTE — REVIEW OF SYSTEMS
[Fatigue] : no fatigue [Decreased Appetite] : appetite not decreased [Recent Weight Gain (___ Lbs)] : no recent weight gain [Recent Weight Loss (___ Lbs)] : no recent weight loss [Visual Field Defect] : no visual field defect [Blurry Vision] : no blurred vision [Dry Eyes] : no dryness of the eyes [Eyes Itch] : no itching of the eyes [Dysphagia] : no dysphagia [Dysphonia] : no dysphonia [Chest Pain] : no chest pain [Palpitations] : no palpitations [Heart Rate Is Slow] : the heart rate was not slow [Heart Rate Is Fast] : the heart rate was not fast [Shortness Of Breath] : no shortness of breath [Wheezing] : no wheezing was heard [Cough] : no cough [SOB on Exertion] : no shortness of breath during exertion [Nausea] : no nausea [Vomiting] : no vomiting was observed [Polyuria] : no polyuria [Hesitancy] : no hesitancy [Joint Pain] : no joint pain [Joint Stiffness] : no joint stiffness [Muscle Weakness] : no muscle weakness [Muscle Cramps] : no muscle cramps [Acanthosis] : no acanthosis  [Hirsutism] : no hirsutism [Headache] : no headaches [Tremors] : no tremors [Dizziness] : no dizziness [Depression] : no depression [Anxiety] : no anxiety [Polydipsia] : no polydipsia [Galactorrhea] : no galactorrhea  [Cold Intolerance] : cold tolerant [Heat Intolerance] : heat tolerant [Easy Bleeding] : no ~M tendency for easy bleeding [Easy Bruising] : no tendency for easy bruising [Swelling] : no swelling

## 2019-03-27 NOTE — ASSESSMENT
[FreeTextEntry1] : 63 year old male here for evaluation of hyperparathyroidism and Diabetes type 2 \par \par Hyperparathyroidism\par -previous two neck explorations with persistent hypercalcemia \par -calcium has increased compared to before \par -Check DEXA scan ( previous normal in 2014)\par -Check 24 hour urine collection is normal \par -Further management as per results, at this time given that invasive procedures are not preferred by the patient, may treat peripheral effects of hyperparathyroidism \par \par \par Diabetes type 2\par -Now with better control, HgA1C of 7.2% ( Goal HgA1C of <7%)\par -He has made extensive lifestyle modifcation and is following a carb consistent diet, will advise to incorporate more exercise. \par -At this time will initiate him on metformin 500 mg BID \par -SMBGS 1-2 times per day \par \par Follow up in 3-4 months. \par

## 2019-03-27 NOTE — HISTORY OF PRESENT ILLNESS
[FreeTextEntry1] : 63 year old male here for evaluation of hyperparathyroidism and Dm Type 2 \par \par Reported that his history of high calcium was ongoing and followed by Dr. Mcgrath \par In 2017, had a lymph node biopsy and subsequently had a headache and had photosensitivity. 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 Currently haydee monitored. \par 2017: He had a parathyroidectomy with Dr. Aranda. PTH levels subsequently did not decrease \par 08/2018: Re-exploration of the parathyroid \par Found to have 90 percent blockage in the RCA and had a stent placement. \par ----------------------------------------------------------------------------\par DEXA scan is pending \par 24 hour urine collection has normal calcium at 62, PTH of 50s\par Does not have any overt symptoms of hypercalcemia \par \par Had a rash which he was treated with steroids. \par Reported that he had ringing in his ears, which is new \par \par Diabetes New Patient HPI\par \par HPI:\par \par Duration of Diabetes: Has been borderline diabetes for 3-4 years \par Is patient on Insulin? \par If yes, how long on insulin? \par \par List Current Medications for Glycemic control and the doses:\par 1- Metformin 500 mg BID \par 2- \par 3- \par \par SMBG (self monitored blood glucose) readings: \par - Name of glucometer: \par - How often does the patient check BG? once a day \par - Does the patient keep a log? \par \par If detailed record is available, what is the range of most of the BG readings?\par - Before Dinner: \par - Before Bedtime: _____________\par \par Does patient get Hypoglycemic episodes? None \par \par \par Diabetic Complications: Is patient aware of having any of those complications?\par - Eyes: Retinopathy? None, beginning of cataract  \par  	When was the last fully dilated eye exam? 02/2019\par - Feet: 	Neuropathy? yes in the feet and hands \par  	Foot Ulcers?None \par 	When was the last time patient saw a Podiatrist? No \par - Kidneys: Nephropathy? GFR of78\par  \par \par Diet: review patient's diet: \par breakfast: Eggs, or oatmeal \par Lunch: Salad, sandwich, tuna, on whole wheat ( smaller) \par Dinner: Grilled chicken, fish, vegetables, sweet potato (smaller), french fries \par Snacks: Nuts, yogurt, bowl of cereal \par Juice or soda: None \par Desserts: None \par \par Exercise: review patient exercise habits: Not much  \par \par Symptoms: Write any symptoms, concerns and issues bothering the patient which have not be addressed above: \par No blurry vision \par No excessive thirst or urination \par

## 2019-04-21 DIAGNOSIS — R21 RASH AND OTHER NONSPECIFIC SKIN ERUPTION: ICD-10-CM

## 2019-04-21 RX ORDER — PREDNISONE 10 MG/1
10 TABLET ORAL
Qty: 30 | Refills: 0 | Status: DISCONTINUED | COMMUNITY
Start: 2019-02-11 | End: 2019-04-21

## 2019-04-22 ENCOUNTER — APPOINTMENT (OUTPATIENT)
Dept: INTERNAL MEDICINE | Facility: CLINIC | Age: 64
End: 2019-04-22
Payer: COMMERCIAL

## 2019-04-22 VITALS
BODY MASS INDEX: 28.35 KG/M2 | HEIGHT: 70 IN | DIASTOLIC BLOOD PRESSURE: 70 MMHG | WEIGHT: 198 LBS | SYSTOLIC BLOOD PRESSURE: 115 MMHG | RESPIRATION RATE: 12 BRPM | HEART RATE: 70 BPM

## 2019-04-22 PROCEDURE — 36415 COLL VENOUS BLD VENIPUNCTURE: CPT

## 2019-04-22 PROCEDURE — 99397 PER PM REEVAL EST PAT 65+ YR: CPT | Mod: 25

## 2019-04-22 NOTE — HEALTH RISK ASSESSMENT
[Smoke Detector] : smoke detector [Carbon Monoxide Detector] : carbon monoxide detector [Seat Belt] :  uses seat belt [Sunscreen] : uses sunscreen [Designated Healthcare Proxy] : Designated healthcare proxy [Discussed at today's visit] : Advance Directives Discussed at today's visit [Name: ___] : Health Care Proxy's Name: [unfilled]  [Fair] :  ~his/her~ mood as fair [No falls in past year] : Patient reported no falls in the past year [0] : 2) Feeling down, depressed, or hopeless: Not at all (0) [# Of Children ___] : has [unfilled] children [Reviewed no changes] : Reviewed no changes [de-identified] : occ [] : No [Reports changes in hearing] : Reports no changes in hearing [[id number]] : 0 [Reports changes in vision] : Reports no changes in vision [Reports changes in dental health] : Reports no changes in dental health [AdvancecareDate] : 04/19

## 2019-04-22 NOTE — ASSESSMENT
[FreeTextEntry1] : 63-year-old male diagnosed with hypertension PVCs last year with improvement on beta blocker.\par Also significant RCA stenosis with stenting and mild disease of the LAD and circumflex.\par Cardiologically stable.\par \par controlled hypertension on Avapro. \par Patient with history of significant hyperlipidemia with good result with Crestor. \par His gout is quiet with keeping his uric acid down with allopurinol and has had no gouty attacks.\par \par Patient with relatively newly diagnosed type 2 diabetes 2 years ago with hopeful control of his sugar.\par \par The patient is status post parathyroidectomy March 2017 but with continued hypercalcemia.\par Reexploration August 2018 with continued hypercalcemia and now followed by endocrinology\par \par \par Patient also with neck schwannoma which is also being followed.\par \par Patient's fatigue likely secondary BB.\par \par Patient encouraged to improve his diet with exercise which may help his energy level.\par \par Therefore he is to continue his present medications\par \par Influenza vaccine already received\par Prevnar and Shingrix discussed\par \par Colonoscopy September 2016 with followup in 10 years\par Ophthalmology yearly\par Patient with persistent tinnitus therefore seek ENT who specializes in tonight\par \par Followup 3 months.

## 2019-04-22 NOTE — PHYSICAL EXAM
[General Appearance - Alert] : alert [Sclera] : the sclera and conjunctiva were normal [General Appearance - In No Acute Distress] : in no acute distress [PERRL With Normal Accommodation] : pupils were equal in size, round, and reactive to light [Extraocular Movements] : extraocular movements were intact [Outer Ear] : the ears and nose were normal in appearance [Oropharynx] : the oropharynx was normal [Neck Appearance] : the appearance of the neck was normal [Neck Cervical Mass (___cm)] : no neck mass was observed [Jugular Venous Distention Increased] : there was no jugular-venous distention [Thyroid Diffuse Enlargement] : the thyroid was not enlarged [Thyroid Nodule] : there were no palpable thyroid nodules [Auscultation Breath Sounds / Voice Sounds] : lungs were clear to auscultation bilaterally [Heart Rate And Rhythm] : heart rate was normal and rhythm regular [Heart Sounds] : normal S1 and S2 [Heart Sounds Gallop] : no gallops [Murmurs] : no murmurs [Arterial Pulses Carotid] : carotid pulses were normal with no bruits [Heart Sounds Pericardial Friction Rub] : no pericardial rub [Full Pulse] : the pedal pulses are present [Arterial Pulses Femoral] : femoral pulses were normal without bruits [Veins - Varicosity Changes] : there were no varicosital changes [Edema] : there was no peripheral edema [Abdomen Soft] : soft [Abdomen Tenderness] : non-tender [Bowel Sounds] : normal bowel sounds [Abdomen Mass (___ Cm)] : no abdominal mass palpated [Normal Sphincter Tone] : normal sphincter tone [No Rectal Mass] : no rectal mass [Prostate Enlargement] : the prostate was not enlarged [Cervical Lymph Nodes Enlarged Posterior Bilaterally] : posterior cervical [Prostate Tenderness] : the prostate was not tender [Supraclavicular Lymph Nodes Enlarged Bilaterally] : supraclavicular [Cervical Lymph Nodes Enlarged Anterior Bilaterally] : anterior cervical [Axillary Lymph Nodes Enlarged Bilaterally] : axillary [Femoral Lymph Nodes Enlarged Bilaterally] : femoral [Inguinal Lymph Nodes Enlarged Bilaterally] : inguinal [No Spinal Tenderness] : no spinal tenderness [Abnormal Walk] : normal gait [Nail Clubbing] : no clubbing  or cyanosis of the fingernails [Musculoskeletal - Swelling] : no joint swelling seen [Motor Tone] : muscle strength and tone were normal [Skin Color & Pigmentation] : normal skin color and pigmentation [Skin Turgor] : normal skin turgor [] : no rash [Right Foot Was Examined] : right foot was examined [Left Foot Was Examined] : left foot was examined [Normal Appearance] : normal in appearance [Normal in Appearance] : normal in appearance [Normal] : normal [2+] : 2+ in the dorsalis pedis [Oriented To Time, Place, And Person] : oriented to person, place, and time [No Focal Deficits] : no focal deficits [Impaired Insight] : insight and judgment were intact [Affect] : the affect was normal [FreeTextEntry1] : small umbilical hernia [#1 Diminished] : number 1 was normal [#2 Diminished] : number 2 was normal [#3 Diminished] : number 3 was normal [#5 Diminished] : number 5 was normal [#6 Diminished] : number 6 was normal [#4 Diminished] : number 4 was normal [#8 Diminished] : number 8 was normal [#7 Diminished] : number 7 was normal [#10 Diminished] : number 10 was normal [#9 Diminished] : number 9 was normal

## 2019-04-22 NOTE — COUNSELING
[Weight management counseling provided] : Weight management [Healthy eating counseling provided] : healthy eating [Activity counseling provided] : activity [None] : None [Needs reinforcement, provided] : Patient needs reinforcement on understanding of disease, goals and obesity follow-up plan; reinforcement was provided [Walking] : Walking

## 2019-04-22 NOTE — HISTORY OF PRESENT ILLNESS
[FreeTextEntry1] : 63-year-old male with history of hypertension for which he takes Avapro, hyperlipidemia on Crestor and gout on allopurinol present for his yearly physical.\par Patient also with history of type 2 diabetes which was diagnosed February 2016.\par \par The patient had cardiac evaluation last year showing a significantly increased PVC burden with patient started on metoprolol.\par Workup included a cardiac catheterization showing severe stenosis of the right coronary artery with stenting and mild disease of the LAD and circumflex.\par He has done well since and continues on aspirin and Plavix. He had side effects from Brilinta.\par \par The patient was diagnosed with hyperparathyroidism and had a parathyroidectomy March 2017.\par No surgical complications but the patient's calcium remains mildly elevated.\par He had a reexploration August 2018 with continued hypercalcemia and further testing recommended by  which the patient did not want to do.\par \par He currently is being followed by endocrinology for hyperparathyroidism and type 2 diabetes.\par \par Part of the preoperative evaluation showed the patient to have a left neck mass with biopsy showing it to be a Schwannoma\par This is also being followed.\par \par As a result of that biopsy the patient got significant headaches which have resolved but continues with paresthesias of his feet for which he was seen in neurology but was dissatisfied.\par The paresthesias have persisted.\par Secondary to persistent symptoms endocrinology recently started patient on gabapentin 100 mg at bedtime which he has not started it yet.\par \par \par The patient has been compliant with his medications and feeling well without any symptoms including no chest pain, palpitations or shortness of breath. His gout has been quiet and he has not used any colchicine for any gouty attacks.\par \par The patient's main complaint is that he is always fatigued.\par He does not exercise.\par He states he is watching his diet and Accu-Cheks range from 90 to as 1:30\par \par Relatively new complaint for 2-3 months it is tinnitus for which the patient saw ENT and was told there was nothing he could offer him.\par He is very bothered by it.

## 2019-04-23 ENCOUNTER — TRANSCRIPTION ENCOUNTER (OUTPATIENT)
Age: 64
End: 2019-04-23

## 2019-04-23 ENCOUNTER — RESULT REVIEW (OUTPATIENT)
Age: 64
End: 2019-04-23

## 2019-04-23 LAB
ALBUMIN SERPL ELPH-MCNC: 4.7 G/DL
ALP BLD-CCNC: 59 U/L
ALT SERPL-CCNC: 25 U/L
ANION GAP SERPL CALC-SCNC: 11 MMOL/L
APPEARANCE: CLEAR
AST SERPL-CCNC: 17 U/L
BACTERIA: NEGATIVE
BASOPHILS # BLD AUTO: 0.07 K/UL
BASOPHILS NFR BLD AUTO: 1.1 %
BILIRUB SERPL-MCNC: 0.4 MG/DL
BILIRUBIN URINE: NEGATIVE
BLOOD URINE: NEGATIVE
BUN SERPL-MCNC: 27 MG/DL
CALCIUM SERPL-MCNC: 11.2 MG/DL
CHLORIDE SERPL-SCNC: 104 MMOL/L
CHOLEST SERPL-MCNC: 136 MG/DL
CHOLEST/HDLC SERPL: 5 RATIO
CO2 SERPL-SCNC: 23 MMOL/L
COLOR: NORMAL
CREAT SERPL-MCNC: 1.2 MG/DL
CREAT SPEC-SCNC: 151 MG/DL
EOSINOPHIL # BLD AUTO: 0.26 K/UL
EOSINOPHIL NFR BLD AUTO: 4.2 %
ESTIMATED AVERAGE GLUCOSE: 180 MG/DL
GLUCOSE QUALITATIVE U: NORMAL
GLUCOSE SERPL-MCNC: 172 MG/DL
HBA1C MFR BLD HPLC: 7.9 %
HCT VFR BLD CALC: 42.6 %
HDLC SERPL-MCNC: 27 MG/DL
HGB BLD-MCNC: 14.6 G/DL
HYALINE CASTS: 2 /LPF
IMM GRANULOCYTES NFR BLD AUTO: 0.3 %
KETONES URINE: NEGATIVE
LDLC SERPL CALC-MCNC: NORMAL MG/DL
LEUKOCYTE ESTERASE URINE: NEGATIVE
LYMPHOCYTES # BLD AUTO: 1.91 K/UL
LYMPHOCYTES NFR BLD AUTO: 30.6 %
MAGNESIUM SERPL-MCNC: 1.5 MG/DL
MAN DIFF?: NORMAL
MCHC RBC-ENTMCNC: 30.5 PG
MCHC RBC-ENTMCNC: 34.3 GM/DL
MCV RBC AUTO: 89.1 FL
MICROALBUMIN 24H UR DL<=1MG/L-MCNC: 11.5 MG/DL
MICROALBUMIN/CREAT 24H UR-RTO: 76 MG/G
MICROSCOPIC-UA: NORMAL
MONOCYTES # BLD AUTO: 0.52 K/UL
MONOCYTES NFR BLD AUTO: 8.3 %
NEUTROPHILS # BLD AUTO: 3.46 K/UL
NEUTROPHILS NFR BLD AUTO: 55.5 %
NITRITE URINE: NEGATIVE
PH URINE: 5.5
PLATELET # BLD AUTO: 201 K/UL
POTASSIUM SERPL-SCNC: 5.2 MMOL/L
PROT SERPL-MCNC: 6.8 G/DL
PROTEIN URINE: ABNORMAL
PSA SERPL-MCNC: 0.98 NG/ML
RBC # BLD: 4.78 M/UL
RBC # FLD: 12.3 %
RED BLOOD CELLS URINE: 1 /HPF
SODIUM SERPL-SCNC: 138 MMOL/L
SPECIFIC GRAVITY URINE: 1.02
SQUAMOUS EPITHELIAL CELLS: 2 /HPF
TRIGL SERPL-MCNC: 415 MG/DL
TSH SERPL-ACNC: 1.72 UIU/ML
URATE SERPL-MCNC: 5.1 MG/DL
UROBILINOGEN URINE: NORMAL
WBC # FLD AUTO: 6.24 K/UL
WHITE BLOOD CELLS URINE: 2 /HPF

## 2019-04-30 ENCOUNTER — RX RENEWAL (OUTPATIENT)
Age: 64
End: 2019-04-30

## 2019-05-02 ENCOUNTER — OTHER (OUTPATIENT)
Age: 64
End: 2019-05-02

## 2019-05-02 LAB
CALCIUM SERPL-MCNC: 11.5 MG/DL
PARATHYROID HORMONE INTACT: 56 PG/ML

## 2019-05-17 ENCOUNTER — APPOINTMENT (OUTPATIENT)
Dept: OTOLARYNGOLOGY | Facility: CLINIC | Age: 64
End: 2019-05-17
Payer: COMMERCIAL

## 2019-05-17 VITALS
WEIGHT: 193 LBS | HEART RATE: 68 BPM | HEIGHT: 70 IN | SYSTOLIC BLOOD PRESSURE: 147 MMHG | BODY MASS INDEX: 27.63 KG/M2 | DIASTOLIC BLOOD PRESSURE: 82 MMHG

## 2019-05-17 DIAGNOSIS — H90.3 SENSORINEURAL HEARING LOSS, BILATERAL: ICD-10-CM

## 2019-05-17 DIAGNOSIS — H68.023 CHRONIC EUSTACHIAN SALPINGITIS, BILATERAL: ICD-10-CM

## 2019-05-17 DIAGNOSIS — H93.13 TINNITUS, BILATERAL: ICD-10-CM

## 2019-05-17 PROCEDURE — 99203 OFFICE O/P NEW LOW 30 MIN: CPT

## 2019-05-17 NOTE — HISTORY OF PRESENT ILLNESS
[de-identified] : bilateral ring ears x 4 mo buzz sound constant\par hearing ok\par pos noise exposure work many y ago\par had audio 1 mo ago\par caridac stent 7 mo ago

## 2019-05-17 NOTE — CONSULT LETTER
[FreeTextEntry2] : dumas [FreeTextEntry1] : Dear Dr. ARLEN DUFFY,\par \par Thank you for your kind referral. Please refer to my enclosed office notes for FLORES MYERS . If there are any questions free to contact me.\par  [FreeTextEntry3] : Oswaldo Torres MD, FACS\par

## 2019-05-17 NOTE — ASSESSMENT
[FreeTextEntry1] : exam unremarkable\par audio reviewed audio  4/3/19\par au sn loss moderate 4000 8000 hz au\par source of tinnitus\par reviewed masking\par fu audio 1 y

## 2019-05-17 NOTE — REVIEW OF SYSTEMS
[Seasonal Allergies] : seasonal allergies [Joint Pain] : joint pain [Throat Dryness] : throat dryness [Negative] : Endocrine [Patient Intake Form Reviewed] : Patient intake form was reviewed

## 2019-05-24 NOTE — H&P PST ADULT - ASSESSMENT
Addended by: ROLAND DAVE on: 5/24/2019 03:31 PM     Modules accepted: Orders     Pt. is a 62 yo male that had elevated serum calcium which has increased, dx with hyperparathyroidism. Pt now scheduled for parathyroidectomy 3/6/17.

## 2019-06-10 ENCOUNTER — OTHER (OUTPATIENT)
Age: 64
End: 2019-06-10

## 2019-06-25 ENCOUNTER — RX RENEWAL (OUTPATIENT)
Age: 64
End: 2019-06-25

## 2019-06-26 ENCOUNTER — RX RENEWAL (OUTPATIENT)
Age: 64
End: 2019-06-26

## 2019-06-27 ENCOUNTER — RX RENEWAL (OUTPATIENT)
Age: 64
End: 2019-06-27

## 2019-07-16 ENCOUNTER — RX CHANGE (OUTPATIENT)
Age: 64
End: 2019-07-16

## 2019-07-22 ENCOUNTER — RX RENEWAL (OUTPATIENT)
Age: 64
End: 2019-07-22

## 2019-07-23 ENCOUNTER — APPOINTMENT (OUTPATIENT)
Dept: INTERNAL MEDICINE | Facility: CLINIC | Age: 64
End: 2019-07-23
Payer: COMMERCIAL

## 2019-07-23 VITALS
WEIGHT: 195 LBS | SYSTOLIC BLOOD PRESSURE: 120 MMHG | BODY MASS INDEX: 27.92 KG/M2 | HEART RATE: 88 BPM | HEIGHT: 70 IN | OXYGEN SATURATION: 95 % | DIASTOLIC BLOOD PRESSURE: 80 MMHG

## 2019-07-23 PROCEDURE — 90670 PCV13 VACCINE IM: CPT

## 2019-07-23 PROCEDURE — 99213 OFFICE O/P EST LOW 20 MIN: CPT | Mod: 25

## 2019-07-23 PROCEDURE — G0009: CPT

## 2019-07-23 PROCEDURE — 36415 COLL VENOUS BLD VENIPUNCTURE: CPT

## 2019-07-23 RX ORDER — METOPROLOL TARTRATE 50 MG/1
50 TABLET, FILM COATED ORAL TWICE DAILY
Refills: 0 | Status: ACTIVE | COMMUNITY
Start: 2018-10-17

## 2019-07-24 LAB
25(OH)D3 SERPL-MCNC: 27.5 NG/ML
ALBUMIN SERPL ELPH-MCNC: 4.9 G/DL
ALP BLD-CCNC: 68 U/L
ALT SERPL-CCNC: 23 U/L
ANION GAP SERPL CALC-SCNC: 11 MMOL/L
AST SERPL-CCNC: 15 U/L
BASOPHILS # BLD AUTO: 0.06 K/UL
BASOPHILS NFR BLD AUTO: 1.2 %
BILIRUB SERPL-MCNC: 0.3 MG/DL
BUN SERPL-MCNC: 18 MG/DL
CALCIUM SERPL-MCNC: 10.5 MG/DL
CHLORIDE SERPL-SCNC: 105 MMOL/L
CHOLEST SERPL-MCNC: 102 MG/DL
CHOLEST/HDLC SERPL: 3.3 RATIO
CO2 SERPL-SCNC: 23 MMOL/L
CREAT SERPL-MCNC: 0.98 MG/DL
EOSINOPHIL # BLD AUTO: 0.28 K/UL
EOSINOPHIL NFR BLD AUTO: 5.5 %
ESTIMATED AVERAGE GLUCOSE: 171 MG/DL
GLUCOSE SERPL-MCNC: 147 MG/DL
HBA1C MFR BLD HPLC: 7.6 %
HCT VFR BLD CALC: 39 %
HDLC SERPL-MCNC: 31 MG/DL
HGB BLD-MCNC: 13.3 G/DL
IMM GRANULOCYTES NFR BLD AUTO: 0.2 %
LDLC SERPL CALC-MCNC: 27 MG/DL
LYMPHOCYTES # BLD AUTO: 1.52 K/UL
LYMPHOCYTES NFR BLD AUTO: 29.9 %
MAGNESIUM SERPL-MCNC: 1.5 MG/DL
MAN DIFF?: NORMAL
MCHC RBC-ENTMCNC: 30.9 PG
MCHC RBC-ENTMCNC: 34.1 GM/DL
MCV RBC AUTO: 90.7 FL
MONOCYTES # BLD AUTO: 0.39 K/UL
MONOCYTES NFR BLD AUTO: 7.7 %
NEUTROPHILS # BLD AUTO: 2.82 K/UL
NEUTROPHILS NFR BLD AUTO: 55.5 %
PLATELET # BLD AUTO: 219 K/UL
POTASSIUM SERPL-SCNC: 4.7 MMOL/L
PROT SERPL-MCNC: 7.1 G/DL
RBC # BLD: 4.3 M/UL
RBC # FLD: 13.2 %
SODIUM SERPL-SCNC: 139 MMOL/L
TRIGL SERPL-MCNC: 219 MG/DL
TSH SERPL-ACNC: 1.38 UIU/ML
URATE SERPL-MCNC: 4.1 MG/DL
WBC # FLD AUTO: 5.08 K/UL

## 2019-07-24 NOTE — HISTORY OF PRESENT ILLNESS
[FreeTextEntry1] : Patient presents for followup on hypertension/hyperlipidemia/T2DM. Patient is currently fasting for today's labs/feels well without complaints. Patient is currently on Diovan/toprol for hypertension, on Crestor for hyperlipidemia and is On metformin for type 2 diabetes\par

## 2019-07-24 NOTE — HEALTH RISK ASSESSMENT
[Fully functional (using the telephone, shopping, preparing meals, housekeeping, doing laundry, using] : Fully functional and needs no help or supervision to perform IADLs (using the telephone, shopping, preparing meals, housekeeping, doing laundry, using transportation, managing medications and managing finances) [Fully functional (bathing, dressing, toileting, transferring, walking, feeding)] : Fully functional (bathing, dressing, toileting, transferring, walking, feeding) [No] : In the past 12 months have you used drugs other than those required for medical reasons? No [Yes] : Yes [2 - 4 times a month (2 pts)] : 2-4 times a month (2 points) [3 or 4 (1 pt)] : 3 or 4  (1 point) [Never (0 pts)] : Never (0 points) [Audit-CScore] : 3 points

## 2019-07-24 NOTE — ASSESSMENT
[FreeTextEntry1] : Labs will be sent out/ further recommendations will be made based on lab results.Prevnar given without reaction. Patient advised continue present medications with diet/exercise and specialist followup. Patient will return to the office in 3-4months\par \par \par \par \par last colonoscopy was 8/2017-next in 5 years\par Last bone density was 12/14-Rx given for followup\par specialists include\par 1. Orthopedic prn-Dr. Parekh\par 2. Dermatology-Dr. Terry\par 3. Ophthalmology-Dr. Alvarez 1/2019\par 4. Dr. joya\par 5. Neurology-Dr. Hughes-unhappy with him- to change-not pursuing follow up at this time\par 6.GI-\par 7.Cardio-Dr. Iyer\par 8.Endocrine-Dr. Ascencio\par 9.ENT-Dr. Torres-to change back to Dr. Solomon\par shingrix d/w pt\par MA 4/2019=+, 24 hour urine container given\par Echo 12/2018\par

## 2019-07-24 NOTE — ADDENDUM
[FreeTextEntry1] : Labs show\par -Diabetes not controlled-increase metformin to 1000 mg b.i.d.\par -Vitamin D of 27.5=take 2000 IUs daily\par -Magnesium level of 1.5= mag oxide 400 mg b.i.d.

## 2019-07-25 ENCOUNTER — RESULT REVIEW (OUTPATIENT)
Age: 64
End: 2019-07-25

## 2019-07-25 ENCOUNTER — TRANSCRIPTION ENCOUNTER (OUTPATIENT)
Age: 64
End: 2019-07-25

## 2019-08-14 ENCOUNTER — APPOINTMENT (OUTPATIENT)
Dept: ENDOCRINOLOGY | Facility: CLINIC | Age: 64
End: 2019-08-14
Payer: COMMERCIAL

## 2019-08-14 VITALS
HEART RATE: 81 BPM | WEIGHT: 194 LBS | DIASTOLIC BLOOD PRESSURE: 84 MMHG | OXYGEN SATURATION: 98 % | BODY MASS INDEX: 27.84 KG/M2 | SYSTOLIC BLOOD PRESSURE: 119 MMHG

## 2019-08-14 PROCEDURE — 99214 OFFICE O/P EST MOD 30 MIN: CPT

## 2019-08-14 NOTE — HISTORY OF PRESENT ILLNESS
[FreeTextEntry1] : 63 year old male here for evaluation of hyperparathyroidism and Dm Type 2 \par \par Reported that his history of high calcium was ongoing and followed by Dr. Mcgrath \par In 2017, had a lymph node biopsy and subsequently had a headache and had photosensitivity. 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 Currently haydee monitored. \par 2017: He had a parathyroidectomy with Dr. Aranda. PTH levels subsequently did not decrease \par 08/2018: Re-exploration of the parathyroid \par Found to have 90 percent blockage in the RCA and had a stent placement. \par Decreased metoprolol dose \par ----------------------------------------------------------------------------\par DEXA scan is pending \par 24 hour urine collection has normal calcium at 62, PTH of 50s\par Does not have any overt symptoms of hypercalcemia ( reported that sensipar has been on back order) \par \par Diabetes New Patient HPI\par \par HPI:\par \par Duration of Diabetes: Has been borderline diabetes for 3-4 years \par Is patient on Insulin? \par If yes, how long on insulin? \par \par List Current Medications for Glycemic control and the doses:\par 1- Metformin 500 mg BID \par 2- \par 3- \par \par SMBG (self monitored blood glucose) readings: \par - Name of glucometer: \par - How often does the patient check BG? once a day \par - Does the patient keep a log? \par \par If detailed record is available, what is the range of most of the BG readings?\par - Before Dinner: \par - Before Bedtime: _____________\par \par Does patient get Hypoglycemic episodes? None \par \par \par Diabetic Complications: Is patient aware of having any of those complications?\par - Eyes: Retinopathy? None, beginning of cataract \par  	When was the last fully dilated eye exam? 02/2019\par - Feet: 	Neuropathy? yes in the feet and hands \par  	Foot Ulcers?None \par 	When was the last time patient saw a Podiatrist? No \par - Kidneys: Nephropathy? GFR of78\par  \par \par Diet: review patient's diet: \par breakfast: Eggs, or oatmeal \par Lunch: Salad, sandwich, tuna, on whole wheat ( smaller) \par Dinner: Grilled chicken, fish, vegetables, sweet potato (smaller), french fries \par Snacks: Nuts, yogurt, bowl of cereal \par Juice or soda: None \par Desserts: None \par \par Exercise: review patient exercise habits:Increased walking \par \par Symptoms: Write any symptoms, concerns and issues bothering the patient which have not be addressed above: \par No blurry vision \par No excessive thirst or urination \par

## 2019-08-14 NOTE — PHYSICAL EXAM
[Alert] : alert [No Acute Distress] : no acute distress [Well Nourished] : well nourished [Well Developed] : well developed [Normal Sclera/Conjunctiva] : normal sclera/conjunctiva [PERRL] : pupils equal, round and reactive to light [EOMI] : extra ocular movement intact [No Proptosis] : no proptosis [Normal Outer Ear/Nose] : the ears and nose were normal in appearance [Normal TMs] : both tympanic membranes were normal [No Neck Mass] : no neck mass was observed [Normal Hearing] : hearing was normal [Supple] : the neck was supple [Thyroid Not Enlarged] : the thyroid was not enlarged [Normal Rate and Effort] : normal respiratory rhythm and effort [No Respiratory Distress] : no respiratory distress [Clear to Auscultation] : lungs were clear to auscultation bilaterally [Normal Rate] : heart rate was normal  [Normal S1, S2] : normal S1 and S2 [Normal Bowel Sounds] : normal bowel sounds [Regular Rhythm] : with a regular rhythm [Not Tender] : non-tender [Soft] : abdomen soft [Not Distended] : not distended [Normal Gait] : normal gait [No CVA Tenderness] : no ~M costovertebral angle tenderness [No Joint Swelling] : no joint swelling seen [Normal Strength/Tone] : muscle strength and tone were normal [No Clubbing, Cyanosis] : no clubbing  or cyanosis of the fingernails [Normal Reflexes] : deep tendon reflexes were 2+ and symmetric [No Rash] : no rash [No Motor Deficits] : the motor exam was normal [No Tremors] : no tremors [Oriented x3] : oriented to person, place, and time [Normal Insight/Judgement] : insight and judgment were intact [Normal Affect] : the affect was normal [Normal Mood] : the mood was normal

## 2019-08-14 NOTE — REVIEW OF SYSTEMS
[Fatigue] : no fatigue [Decreased Appetite] : appetite not decreased [Recent Weight Gain (___ Lbs)] : no recent weight gain [Recent Weight Loss (___ Lbs)] : no recent weight loss [Visual Field Defect] : no visual field defect [Blurry Vision] : no blurred vision [Dry Eyes] : no dryness of the eyes [Eyes Itch] : no itching of the eyes [Dysphagia] : no dysphagia [Dysphonia] : no dysphonia [Neck Pain] : no neck pain [Nasal Congestion] : no nasal congestion [Chest Pain] : no chest pain [Palpitations] : no palpitations [Heart Rate Is Slow] : the heart rate was not slow [Heart Rate Is Fast] : the heart rate was not fast [Shortness Of Breath] : no shortness of breath [Wheezing] : no wheezing was heard [Cough] : no cough [SOB on Exertion] : no shortness of breath during exertion [Nausea] : no nausea [Vomiting] : no vomiting was observed [Constipation] : no constipation [Diarrhea] : no diarrhea [Polyuria] : no polyuria [Hesitancy] : no hesitancy [Joint Pain] : no joint pain [Joint Stiffness] : no joint stiffness [Muscle Weakness] : no muscle weakness [Muscle Cramps] : no muscle cramps [Acanthosis] : no acanthosis  [Hirsutism] : no hirsutism [Acne] : no acne [Hair Loss] : no hair loss [Headache] : no headaches [Depression] : no depression [Tremors] : no tremors [Anxiety] : no anxiety [Polydipsia] : no polydipsia [Galactorrhea] : no galactorrhea  [Cold Intolerance] : cold tolerant [Heat Intolerance] : heat tolerant [Easy Bruising] : no tendency for easy bruising [Easy Bleeding] : no ~M tendency for easy bleeding [Swelling] : no swelling

## 2019-08-14 NOTE — ASSESSMENT
[FreeTextEntry1] : 63 year old male here for evaluation of hyperparathyroidism and Diabetes type 2 \par \par Hyperparathyroidism\par -previous two neck explorations with persistent hypercalcemia \par -currently on sensipar \par -Check DEXA scan ( previous normal in 2014) on next visit \par -24 hour urine collection has been normal \par -Further management as per results, at this time given that invasive procedures are not preferred by the patient, may treat peripheral effects of hyperparathyroidism \par \par \par Diabetes type 2\par - HgA1C of 7.6% ( Goal HgA1C of <7%)\par -He has made extensive lifestyle modifcation and is following a carb consistent diet, will advise to incorporate more exercise. \par -Continue metformin 500 mg BID \par -SMBGS 1-2 times per day \par \par Follow up in 3-4 months.

## 2019-08-30 ENCOUNTER — APPOINTMENT (OUTPATIENT)
Dept: INTERNAL MEDICINE | Facility: CLINIC | Age: 64
End: 2019-08-30
Payer: COMMERCIAL

## 2019-08-30 VITALS
SYSTOLIC BLOOD PRESSURE: 130 MMHG | HEIGHT: 70 IN | OXYGEN SATURATION: 98 % | TEMPERATURE: 98.3 F | WEIGHT: 193 LBS | HEART RATE: 83 BPM | BODY MASS INDEX: 27.63 KG/M2 | DIASTOLIC BLOOD PRESSURE: 84 MMHG

## 2019-08-30 PROCEDURE — 99213 OFFICE O/P EST LOW 20 MIN: CPT

## 2019-09-11 ENCOUNTER — FORM ENCOUNTER (OUTPATIENT)
Age: 64
End: 2019-09-11

## 2019-09-12 ENCOUNTER — OUTPATIENT (OUTPATIENT)
Dept: OUTPATIENT SERVICES | Facility: HOSPITAL | Age: 64
LOS: 1 days | End: 2019-09-12
Payer: COMMERCIAL

## 2019-09-12 ENCOUNTER — APPOINTMENT (OUTPATIENT)
Dept: CT IMAGING | Facility: CLINIC | Age: 64
End: 2019-09-12
Payer: COMMERCIAL

## 2019-09-12 DIAGNOSIS — E89.2 POSTPROCEDURAL HYPOPARATHYROIDISM: Chronic | ICD-10-CM

## 2019-09-12 DIAGNOSIS — Z98.89 OTHER SPECIFIED POSTPROCEDURAL STATES: Chronic | ICD-10-CM

## 2019-09-12 DIAGNOSIS — R10.9 UNSPECIFIED ABDOMINAL PAIN: ICD-10-CM

## 2019-09-12 PROCEDURE — 82565 ASSAY OF CREATININE: CPT

## 2019-09-12 PROCEDURE — 74178 CT ABD&PLV WO CNTR FLWD CNTR: CPT | Mod: 26

## 2019-09-12 PROCEDURE — 74178 CT ABD&PLV WO CNTR FLWD CNTR: CPT

## 2019-09-13 ENCOUNTER — TRANSCRIPTION ENCOUNTER (OUTPATIENT)
Age: 64
End: 2019-09-13

## 2019-09-13 ENCOUNTER — RESULT REVIEW (OUTPATIENT)
Age: 64
End: 2019-09-13

## 2019-09-13 NOTE — ASSESSMENT
[FreeTextEntry1] : Symptoms appear musculoskeletal in nature, symptoms have improved and currently asymptomatic. Patient has no signs of kidney stone or intra-abdominal pathology. Discussed CT of the abdomen and pelvis which we will pursue if symptoms persist or worsen. Patient will call with progress and return to the office as scheduled for regular followup\par \par Dr. Mcgrath was present in office building while I examined patient\par \par \par

## 2019-09-13 NOTE — HISTORY OF PRESENT ILLNESS
[FreeTextEntry8] : Patient presents complaining of right sided flank/trunk pain that started one week ago. Patient states symptoms are currently gone but he did have this morning. Patient states symptoms appear to be worse with twisting and movement. Patient has no associated nausea/vomiting/diarrhea/urinary symptoms/fever/chest pain/dyspnea and has noted no rash in the area. Patient has not taken any OTC medication. Patient states it feels more muscular in nature, no trauma or injury

## 2019-09-13 NOTE — PHYSICAL EXAM
[No Acute Distress] : no acute distress [No Respiratory Distress] : no respiratory distress  [Clear to Auscultation] : lungs were clear to auscultation bilaterally [Normal Rate] : normal rate  [Regular Rhythm] : with a regular rhythm [Normal Affect] : the affect was normal [Normal Mood] : the mood was normal [Soft] : abdomen soft [Non Tender] : non-tender [No CVA Tenderness] : no CVA  tenderness [Normal Bowel Sounds] : normal bowel sounds [No Rash] : no rash [de-identified] : no guarding/rebound [de-identified] : FROM [de-identified] : no rash in area of concern

## 2019-09-13 NOTE — ADDENDUM
[FreeTextEntry1] : Patient called on 9/3 as he is still symptomatic= CT of the abdomen and pelvis ordered\par Insurance company states that patient needs criteria for CT urogram and not CT of the abdomen and pelvis therefore changed\par \par CT of the abdomen and pelvis shows\par -Bilateral renal cysts\par -Mild cortical scarring in the upper pole of the right kidney\par -Mild nonspecific bilateral perinephric stranding\par -Fat-containing umbilical hernia

## 2019-09-25 ENCOUNTER — APPOINTMENT (OUTPATIENT)
Dept: INTERNAL MEDICINE | Facility: CLINIC | Age: 64
End: 2019-09-25
Payer: COMMERCIAL

## 2019-09-25 VITALS
BODY MASS INDEX: 27.2 KG/M2 | SYSTOLIC BLOOD PRESSURE: 120 MMHG | DIASTOLIC BLOOD PRESSURE: 70 MMHG | HEIGHT: 70 IN | HEART RATE: 80 BPM | RESPIRATION RATE: 12 BRPM | WEIGHT: 190 LBS

## 2019-09-25 PROCEDURE — 99213 OFFICE O/P EST LOW 20 MIN: CPT

## 2019-09-26 ENCOUNTER — TRANSCRIPTION ENCOUNTER (OUTPATIENT)
Age: 64
End: 2019-09-26

## 2019-09-26 LAB
APPEARANCE: CLEAR
BACTERIA: NEGATIVE
BILIRUBIN URINE: NEGATIVE
BLOOD URINE: NEGATIVE
COLOR: YELLOW
GLUCOSE QUALITATIVE U: ABNORMAL
HYALINE CASTS: 0 /LPF
KETONES URINE: NEGATIVE
LEUKOCYTE ESTERASE URINE: NEGATIVE
MICROSCOPIC-UA: NORMAL
NITRITE URINE: NEGATIVE
PH URINE: 5.5
PROTEIN URINE: ABNORMAL
RED BLOOD CELLS URINE: 2 /HPF
SPECIFIC GRAVITY URINE: 1.03
SQUAMOUS EPITHELIAL CELLS: 11 /HPF
UROBILINOGEN URINE: NORMAL
WHITE BLOOD CELLS URINE: 5 /HPF

## 2019-10-02 RX ORDER — CINACALCET 30 MG/1
30 TABLET ORAL DAILY
Qty: 3 | Refills: 1 | Status: DISCONTINUED | COMMUNITY
Start: 2019-05-02 | End: 2019-10-02

## 2019-10-02 NOTE — ADDENDUM
[FreeTextEntry1] : October 2, 2019:\par Patient called to state that his abdominal symptoms continued and he started with diarrhea.\par He also noted that he was recently started on Sensipar by endocrinology and thought it might be related therefore stopped it and all symptoms have resolved.

## 2019-10-02 NOTE — HISTORY OF PRESENT ILLNESS
[FreeTextEntry8] : Noah presents for recheck after being seen about a month ago for right back/flank pain.\par \par The patient was seen on August 30 with right flank pain which seems musculoskeletal in nature and was sent for a CT scan of the abdomen and pelvis which was negative other than renal cysts, nonspecific bilateral perinephric stranding and umbilical hernia.\par \par The patient states the symptoms have persisted at about the same level but likely slightly better. Symptoms continued to increase with certain movements such as trying sit-ups.\par No abdominal symptoms such as nausea, vomiting, change in bowels, hematuria, dysuria, fever or chills.\par \par The patient has not tried any treatment for this.\par \par When asked to point to the area of his discomfort the patient points to his right lumbar area

## 2019-10-02 NOTE — ASSESSMENT
[FreeTextEntry1] : Patient's signs and symptoms highly likely secondary to lumbar paraspinal muscle strain. No evidence of muscle spasm.\par \par Plan a trial of anti-inflammatory with Aleve one to 2 tablets twice a day with food for 5-7 days.\par Also gentle stretching and home physical therapy\par Avoid movements and increased pain\par \par UA to be checked

## 2019-10-02 NOTE — PHYSICAL EXAM
[No Acute Distress] : no acute distress [Well-Appearing] : well-appearing [No Respiratory Distress] : no respiratory distress  [Normal Rate] : normal rate  [Clear to Auscultation] : lungs were clear to auscultation bilaterally [Regular Rhythm] : with a regular rhythm [No Edema] : there was no peripheral edema [No CVA Tenderness] : no CVA  tenderness [Normal Gait] : normal gait [No Spinal Tenderness] : no spinal tenderness [Normal Affect] : the affect was normal [de-identified] : Right lumbar paraspinal area nontender to palpation

## 2019-10-14 ENCOUNTER — APPOINTMENT (OUTPATIENT)
Dept: INTERNAL MEDICINE | Facility: CLINIC | Age: 64
End: 2019-10-14
Payer: COMMERCIAL

## 2019-10-14 ENCOUNTER — MED ADMIN CHARGE (OUTPATIENT)
Age: 64
End: 2019-10-14

## 2019-10-14 ENCOUNTER — LABORATORY RESULT (OUTPATIENT)
Age: 64
End: 2019-10-14

## 2019-10-14 VITALS
HEART RATE: 78 BPM | HEIGHT: 70 IN | DIASTOLIC BLOOD PRESSURE: 82 MMHG | WEIGHT: 194 LBS | BODY MASS INDEX: 27.77 KG/M2 | OXYGEN SATURATION: 98 % | SYSTOLIC BLOOD PRESSURE: 132 MMHG

## 2019-10-14 PROCEDURE — 90653 IIV ADJUVANT VACCINE IM: CPT

## 2019-10-14 PROCEDURE — G0008: CPT

## 2019-10-14 PROCEDURE — 99213 OFFICE O/P EST LOW 20 MIN: CPT | Mod: 25

## 2019-10-14 PROCEDURE — 36415 COLL VENOUS BLD VENIPUNCTURE: CPT

## 2019-10-15 ENCOUNTER — TRANSCRIPTION ENCOUNTER (OUTPATIENT)
Age: 64
End: 2019-10-15

## 2019-10-15 LAB
25(OH)D3 SERPL-MCNC: 17.4 NG/ML
ALBUMIN SERPL ELPH-MCNC: 5.1 G/DL
ALP BLD-CCNC: 94 U/L
ALT SERPL-CCNC: 33 U/L
ANION GAP SERPL CALC-SCNC: 15 MMOL/L
AST SERPL-CCNC: 23 U/L
BASOPHILS # BLD AUTO: 0.04 K/UL
BASOPHILS NFR BLD AUTO: 0.8 %
BILIRUB SERPL-MCNC: 0.4 MG/DL
BUN SERPL-MCNC: 17 MG/DL
CALCIUM SERPL-MCNC: 11.5 MG/DL
CHLORIDE SERPL-SCNC: 98 MMOL/L
CHOLEST SERPL-MCNC: 166 MG/DL
CHOLEST/HDLC SERPL: 6.4 RATIO
CO2 SERPL-SCNC: 23 MMOL/L
CREAT SERPL-MCNC: 0.96 MG/DL
EOSINOPHIL # BLD AUTO: 0.35 K/UL
EOSINOPHIL NFR BLD AUTO: 6.7 %
ESTIMATED AVERAGE GLUCOSE: 160 MG/DL
GLUCOSE SERPL-MCNC: 181 MG/DL
HBA1C MFR BLD HPLC: 7.2 %
HCT VFR BLD CALC: 42.1 %
HDLC SERPL-MCNC: 26 MG/DL
HGB BLD-MCNC: 14.2 G/DL
IMM GRANULOCYTES NFR BLD AUTO: 0.2 %
LDLC SERPL CALC-MCNC: NORMAL MG/DL
LYMPHOCYTES # BLD AUTO: 1.65 K/UL
LYMPHOCYTES NFR BLD AUTO: 31.4 %
MAGNESIUM SERPL-MCNC: 1.6 MG/DL
MAN DIFF?: NORMAL
MCHC RBC-ENTMCNC: 30.7 PG
MCHC RBC-ENTMCNC: 33.7 GM/DL
MCV RBC AUTO: 91.1 FL
MONOCYTES # BLD AUTO: 0.38 K/UL
MONOCYTES NFR BLD AUTO: 7.2 %
NEUTROPHILS # BLD AUTO: 2.82 K/UL
NEUTROPHILS NFR BLD AUTO: 53.7 %
PLATELET # BLD AUTO: 210 K/UL
POTASSIUM SERPL-SCNC: 4.2 MMOL/L
PROT SERPL-MCNC: 7.5 G/DL
RBC # BLD: 4.62 M/UL
RBC # FLD: 12.8 %
SODIUM SERPL-SCNC: 136 MMOL/L
TRIGL SERPL-MCNC: 799 MG/DL
TSH SERPL-ACNC: 1.64 UIU/ML
URATE SERPL-MCNC: 4.3 MG/DL
WBC # FLD AUTO: 5.25 K/UL

## 2019-10-15 NOTE — ADDENDUM
[FreeTextEntry1] : Labs show\par -Vitamin D of 17.4= again advised to take 2000 IUs daily\par -Calcium of 11.5= results faxed to endocrinology and Dr. Aranda\par -Significantly elevated triglycerides with low HDL despite Crestor= recommend adding Vacepa

## 2019-10-15 NOTE — ASSESSMENT
[FreeTextEntry1] : Labs will be sent out/ further recommendations will be made based on lab results.HD flu vaccine given without reaction. Patient advised continue present medications with diet/exercise and specialist followup. Patient will return to the office in 3-4months\par \par \par \par \par last colonoscopy was 8/2017-next in 5 years\par Last bone density was 12/14-Rx given for followup\par specialists include\par 1. Orthopedic prn-Dr. Parekh\par 2. Dermatology-Dr. Terry\par 3. Ophthalmology-Dr. Alvarez 1/2019\par 4. Dr. joya\par 5. Neurology-Dr. Hughes-unhappy with him- to change-not pursuing follow up at this time\par 6.GI-\par 7.Cardio-Dr. Iyer\par 8.Endocrine-Dr. Ascencio\par 9.ENT-Dr. Torres-to change back to Dr. Solomon\par shingrix d/w pt\par MA 4/2019=+, 24 hour urine container given\par Echo 12/2018\par

## 2019-10-15 NOTE — HISTORY OF PRESENT ILLNESS
[FreeTextEntry1] : Patient presents for followup on hypertension/hyperlipidemia/T2DM. Patient is currently fasting for today's labs/feels well without complaints. Patient is currently on Diovan/toprol for hypertension, on Crestor for hyperlipidemia and is On metformin for type 2 diabetes\par -Back pain resolved off of Sensipar (see last 2 notes)\par

## 2019-10-22 ENCOUNTER — RX RENEWAL (OUTPATIENT)
Age: 64
End: 2019-10-22

## 2019-10-30 ENCOUNTER — TRANSCRIPTION ENCOUNTER (OUTPATIENT)
Age: 64
End: 2019-10-30

## 2019-11-06 ENCOUNTER — TRANSCRIPTION ENCOUNTER (OUTPATIENT)
Age: 64
End: 2019-11-06

## 2019-11-13 ENCOUNTER — OTHER (OUTPATIENT)
Age: 64
End: 2019-11-13

## 2019-11-13 ENCOUNTER — RESULT REVIEW (OUTPATIENT)
Age: 64
End: 2019-11-13

## 2019-11-18 ENCOUNTER — RX RENEWAL (OUTPATIENT)
Age: 64
End: 2019-11-18

## 2019-11-21 ENCOUNTER — APPOINTMENT (OUTPATIENT)
Dept: SURGERY | Facility: CLINIC | Age: 64
End: 2019-11-21
Payer: COMMERCIAL

## 2019-11-21 PROCEDURE — 36415 COLL VENOUS BLD VENIPUNCTURE: CPT

## 2019-11-21 PROCEDURE — 99213 OFFICE O/P EST LOW 20 MIN: CPT

## 2019-11-21 NOTE — HISTORY OF PRESENT ILLNESS
[de-identified] : s/p failed reexploration for recurrent hyperparathyroidism.  denies pain, dysphagia, hoarseness or new lesions.   had adverse reaction to Sensipar and has since stopped with resolution of back pain and diarrhea.

## 2019-11-21 NOTE — PHYSICAL EXAM
[de-identified] : well healed scar [de-identified] : no palpable thyroid nodules.  [Laryngoscopy Performed] : laryngoscopy was performed, see procedure section for findings [Midline] : located in midline position [Normal] : orientation to person, place, and time: normal

## 2019-11-21 NOTE — ASSESSMENT
[FreeTextEntry1] : has appt to see dr flores for consideration of other medications to lower calcium.  discussed may require selective venous sampling.  to maintain hydration.  bloods drawn. to call next week for results. to return earlier if any change

## 2019-11-22 LAB
24R-OH-CALCIDIOL SERPL-MCNC: 45.6 PG/ML
CALCIUM SERPL-MCNC: 11.8 MG/DL
CALCIUM SERPL-MCNC: 11.8 MG/DL
PARATHYROID HORMONE INTACT: 56 PG/ML

## 2019-11-25 ENCOUNTER — OTHER (OUTPATIENT)
Age: 64
End: 2019-11-25

## 2020-01-07 ENCOUNTER — RX RENEWAL (OUTPATIENT)
Age: 65
End: 2020-01-07

## 2020-01-16 ENCOUNTER — APPOINTMENT (OUTPATIENT)
Dept: INTERNAL MEDICINE | Facility: CLINIC | Age: 65
End: 2020-01-16
Payer: COMMERCIAL

## 2020-01-16 VITALS
DIASTOLIC BLOOD PRESSURE: 72 MMHG | SYSTOLIC BLOOD PRESSURE: 135 MMHG | OXYGEN SATURATION: 98 % | WEIGHT: 194 LBS | HEART RATE: 78 BPM | BODY MASS INDEX: 27.77 KG/M2 | HEIGHT: 70 IN

## 2020-01-16 PROCEDURE — 36415 COLL VENOUS BLD VENIPUNCTURE: CPT

## 2020-01-16 PROCEDURE — 99213 OFFICE O/P EST LOW 20 MIN: CPT | Mod: 25

## 2020-01-16 RX ORDER — CHOLECALCIFEROL (VITAMIN D3) 50 MCG
50 MCG TABLET ORAL
Refills: 0 | Status: DISCONTINUED | COMMUNITY
Start: 2019-07-25 | End: 2020-01-16

## 2020-01-16 RX ORDER — METFORMIN HYDROCHLORIDE 1000 MG/1
1000 TABLET, COATED ORAL
Qty: 180 | Refills: 1 | Status: DISCONTINUED | COMMUNITY
Start: 2018-11-07 | End: 2020-01-16

## 2020-01-16 NOTE — PHYSICAL EXAM
[General Appearance - In No Acute Distress] : in no acute distress [] : no respiratory distress [Heart Rate And Rhythm] : heart rate was normal and rhythm regular [Auscultation Breath Sounds / Voice Sounds] : lungs were clear to auscultation bilaterally [Respiration, Rhythm And Depth] : normal respiratory rhythm and effort [Mood] : the mood was normal [Affect] : the affect was normal

## 2020-01-17 ENCOUNTER — TRANSCRIPTION ENCOUNTER (OUTPATIENT)
Age: 65
End: 2020-01-17

## 2020-01-17 LAB
25(OH)D3 SERPL-MCNC: 14.4 NG/ML
ALBUMIN SERPL ELPH-MCNC: 4.9 G/DL
ALP BLD-CCNC: 71 U/L
ALT SERPL-CCNC: 23 U/L
ANION GAP SERPL CALC-SCNC: 14 MMOL/L
AST SERPL-CCNC: 18 U/L
BASOPHILS # BLD AUTO: 0.07 K/UL
BASOPHILS NFR BLD AUTO: 1.6 %
BILIRUB SERPL-MCNC: 0.3 MG/DL
BUN SERPL-MCNC: 19 MG/DL
CALCIUM SERPL-MCNC: 11.5 MG/DL
CHLORIDE SERPL-SCNC: 102 MMOL/L
CHOLEST SERPL-MCNC: 138 MG/DL
CHOLEST/HDLC SERPL: 5.7 RATIO
CO2 SERPL-SCNC: 23 MMOL/L
CREAT SERPL-MCNC: 1.03 MG/DL
EOSINOPHIL # BLD AUTO: 0.27 K/UL
EOSINOPHIL NFR BLD AUTO: 6.1 %
ESTIMATED AVERAGE GLUCOSE: 174 MG/DL
GLUCOSE SERPL-MCNC: 178 MG/DL
HBA1C MFR BLD HPLC: 7.7 %
HCT VFR BLD CALC: 40.6 %
HDLC SERPL-MCNC: 24 MG/DL
HGB BLD-MCNC: 13.7 G/DL
IMM GRANULOCYTES NFR BLD AUTO: 0.2 %
LDLC SERPL CALC-MCNC: NORMAL MG/DL
LYMPHOCYTES # BLD AUTO: 1.37 K/UL
LYMPHOCYTES NFR BLD AUTO: 30.7 %
MAGNESIUM SERPL-MCNC: 1.7 MG/DL
MAN DIFF?: NORMAL
MCHC RBC-ENTMCNC: 29.8 PG
MCHC RBC-ENTMCNC: 33.7 GM/DL
MCV RBC AUTO: 88.5 FL
MONOCYTES # BLD AUTO: 0.4 K/UL
MONOCYTES NFR BLD AUTO: 9 %
NEUTROPHILS # BLD AUTO: 2.34 K/UL
NEUTROPHILS NFR BLD AUTO: 52.4 %
PLATELET # BLD AUTO: 222 K/UL
POTASSIUM SERPL-SCNC: 4.6 MMOL/L
PROT SERPL-MCNC: 6.9 G/DL
RBC # BLD: 4.59 M/UL
RBC # FLD: 12.3 %
SODIUM SERPL-SCNC: 139 MMOL/L
TRIGL SERPL-MCNC: 614 MG/DL
TSH SERPL-ACNC: 1.02 UIU/ML
URATE SERPL-MCNC: 5.6 MG/DL
WBC # FLD AUTO: 4.46 K/UL

## 2020-01-17 NOTE — ADDENDUM
[FreeTextEntry1] : Labs show\par -Vitamin D decreased to 14 with calcium elevated at 11.5= to followup with endocrinology and Dr. Aranda= results faxed\par -T2DM Not controlled= increase metformin to 1000 mg b.i.d.\par -Triglycerides continue to be elevated with low HDL= recommend adding vascepa

## 2020-01-17 NOTE — ASSESSMENT
[FreeTextEntry1] : Venipuncture done in our office, Labs sent out/ further recommendations will be made based on lab results. Patient advised continue present medications with diet/exercise and specialist followup. Patient will return to the office in 3-4months for CP\par \par last colonoscopy was 8/2017-next in 5 years\par Last bone density was 12/14-"to do, has rx"\par specialists include\par 1. Orthopedic prn-Dr. Parekh\par 2. Dermatology-Dr. Terry\par 3. Ophthalmology-Dr. Alvarez 1/2019-to sched followup\par 4. Dr. joya\par 5. Neurology-Dr. Hughes-unhappy with him- to change-not pursuing follow up at this time\par 6.GI-\par 7.Cardio-Dr. Iyer\par 8.Endocrine-Dr. Ascencio\par 9.ENT-Dr. Torres-to change back to Dr. Solomon\par shingrix d/w pt\par MA 4/2019=+, 24 hour urine "to do"\par Echo 10/2018\par

## 2020-01-17 NOTE — HISTORY OF PRESENT ILLNESS
[FreeTextEntry1] : Patient presents for followup on hypertension/hyperlipidemia/T2DM. Patient is currently fasting for today's labs/feels well without complaints. Patient is currently on Diovan/toprol for hypertension, on Crestor for hyperlipidemia and is On metformin for type 2 diabetes\par -s/p "cold", resolved, no fever\par

## 2020-01-22 ENCOUNTER — TRANSCRIPTION ENCOUNTER (OUTPATIENT)
Age: 65
End: 2020-01-22

## 2020-01-23 ENCOUNTER — RESULT REVIEW (OUTPATIENT)
Age: 65
End: 2020-01-23

## 2020-01-27 ENCOUNTER — RX RENEWAL (OUTPATIENT)
Age: 65
End: 2020-01-27

## 2020-01-28 ENCOUNTER — FORM ENCOUNTER (OUTPATIENT)
Age: 65
End: 2020-01-28

## 2020-01-29 ENCOUNTER — OUTPATIENT (OUTPATIENT)
Dept: OUTPATIENT SERVICES | Facility: HOSPITAL | Age: 65
LOS: 1 days | End: 2020-01-29
Payer: COMMERCIAL

## 2020-01-29 ENCOUNTER — APPOINTMENT (OUTPATIENT)
Dept: RADIOLOGY | Facility: CLINIC | Age: 65
End: 2020-01-29
Payer: COMMERCIAL

## 2020-01-29 DIAGNOSIS — Z98.89 OTHER SPECIFIED POSTPROCEDURAL STATES: Chronic | ICD-10-CM

## 2020-01-29 DIAGNOSIS — E89.2 POSTPROCEDURAL HYPOPARATHYROIDISM: Chronic | ICD-10-CM

## 2020-01-29 DIAGNOSIS — Z00.00 ENCOUNTER FOR GENERAL ADULT MEDICAL EXAMINATION WITHOUT ABNORMAL FINDINGS: ICD-10-CM

## 2020-01-29 PROCEDURE — 77080 DXA BONE DENSITY AXIAL: CPT | Mod: 26

## 2020-01-29 PROCEDURE — 77080 DXA BONE DENSITY AXIAL: CPT

## 2020-01-31 ENCOUNTER — TRANSCRIPTION ENCOUNTER (OUTPATIENT)
Age: 65
End: 2020-01-31

## 2020-03-25 ENCOUNTER — RX RENEWAL (OUTPATIENT)
Age: 65
End: 2020-03-25

## 2020-04-16 ENCOUNTER — RX RENEWAL (OUTPATIENT)
Age: 65
End: 2020-04-16

## 2020-04-20 ENCOUNTER — RX RENEWAL (OUTPATIENT)
Age: 65
End: 2020-04-20

## 2020-04-22 ENCOUNTER — RX RENEWAL (OUTPATIENT)
Age: 65
End: 2020-04-22

## 2020-04-22 NOTE — ASU PATIENT PROFILE, ADULT - TEACHING/LEARNING RELIGIOUS CONSIDERATIONS
Cheilitis Aggressive Treatment: I recommended application of Vaseline or Aquaphor numerous times a day (as often as every hour) and before going to bed. I also prescribed a topical steroid for twice daily use. none

## 2020-04-29 ENCOUNTER — APPOINTMENT (OUTPATIENT)
Dept: ENDOCRINOLOGY | Facility: CLINIC | Age: 65
End: 2020-04-29

## 2020-05-28 ENCOUNTER — APPOINTMENT (OUTPATIENT)
Dept: SURGERY | Facility: CLINIC | Age: 65
End: 2020-05-28

## 2020-09-11 ENCOUNTER — APPOINTMENT (OUTPATIENT)
Dept: INTERNAL MEDICINE | Facility: CLINIC | Age: 65
End: 2020-09-11
Payer: COMMERCIAL

## 2020-09-11 ENCOUNTER — NON-APPOINTMENT (OUTPATIENT)
Age: 65
End: 2020-09-11

## 2020-09-11 VITALS
DIASTOLIC BLOOD PRESSURE: 70 MMHG | RESPIRATION RATE: 11 BRPM | BODY MASS INDEX: 27.63 KG/M2 | HEART RATE: 72 BPM | TEMPERATURE: 97.2 F | HEIGHT: 70 IN | SYSTOLIC BLOOD PRESSURE: 125 MMHG | WEIGHT: 193 LBS

## 2020-09-11 DIAGNOSIS — Z92.29 PERSONAL HISTORY OF OTHER DRUG THERAPY: ICD-10-CM

## 2020-09-11 PROCEDURE — 90750 HZV VACC RECOMBINANT IM: CPT

## 2020-09-11 PROCEDURE — G0444 DEPRESSION SCREEN ANNUAL: CPT | Mod: NC

## 2020-09-11 PROCEDURE — 99397 PER PM REEVAL EST PAT 65+ YR: CPT | Mod: 25

## 2020-09-11 PROCEDURE — 36415 COLL VENOUS BLD VENIPUNCTURE: CPT

## 2020-09-11 PROCEDURE — G0442 ANNUAL ALCOHOL SCREEN 15 MIN: CPT | Mod: NC

## 2020-09-11 PROCEDURE — 90472 IMMUNIZATION ADMIN EACH ADD: CPT

## 2020-09-11 PROCEDURE — 93000 ELECTROCARDIOGRAM COMPLETE: CPT

## 2020-09-11 PROCEDURE — 90662 IIV NO PRSV INCREASED AG IM: CPT

## 2020-09-11 PROCEDURE — G0008: CPT

## 2020-09-11 NOTE — ASSESSMENT
[FreeTextEntry1] : 65-year-old male diagnosed with hypertension PVCs 2018 with improvement on beta blocker.\par Also significant RCA stenosis with stenting and mild disease of the LAD and circumflex.\par Cardiologically stable.\par \par controlled hypertension on Avapro. \par \par Patient with history of significant hyperlipidemia with good result with Crestor. \par \par His gout is quiet with keeping his uric acid down with allopurinol and has had no gouty attacks.\par \par Patient with diagnosed type 2 diabetes 2017 with hopeful control of his sugar.\par \par The patient is status post parathyroidectomy March 2017 but with continued hypercalcemia.\par Reexploration August 2018 with continued hypercalcemia and now followed by endocrinology\par \par \par Patient also with neck schwannoma which is also being followed by Dr Aranda\par \par Patient's fatigue likely secondary BB.\par \par Patient encouraged to improve his diet with exercise which may help his energy level.\par \par Therefore he is to continue his present medications\par \par Influenza vaccine given left deltoid\par Shingrix #1 given right deltoid\par \par Colonoscopy September 2016 with followup in 10 years\par Ophthalmology yearly\par \par Venipuncture done today in the office\par \par Followup 3 months with Pneumovax and Shingrix #2

## 2020-09-11 NOTE — PHYSICAL EXAM
[General Appearance - Alert] : alert [PERRL With Normal Accommodation] : pupils were equal in size, round, and reactive to light [General Appearance - In No Acute Distress] : in no acute distress [Sclera] : the sclera and conjunctiva were normal [Outer Ear] : the ears and nose were normal in appearance [Extraocular Movements] : extraocular movements were intact [Oropharynx] : the oropharynx was normal [Neck Appearance] : the appearance of the neck was normal [Neck Cervical Mass (___cm)] : no neck mass was observed [Jugular Venous Distention Increased] : there was no jugular-venous distention [Thyroid Diffuse Enlargement] : the thyroid was not enlarged [Thyroid Nodule] : there were no palpable thyroid nodules [Heart Rate And Rhythm] : heart rate was normal and rhythm regular [Auscultation Breath Sounds / Voice Sounds] : lungs were clear to auscultation bilaterally [Heart Sounds] : normal S1 and S2 [Murmurs] : no murmurs [Heart Sounds Gallop] : no gallops [Heart Sounds Pericardial Friction Rub] : no pericardial rub [Arterial Pulses Carotid] : carotid pulses were normal with no bruits [Arterial Pulses Femoral] : femoral pulses were normal without bruits [Veins - Varicosity Changes] : there were no varicosital changes [Edema] : there was no peripheral edema [Full Pulse] : the pedal pulses are present [Abdomen Soft] : soft [Bowel Sounds] : normal bowel sounds [Abdomen Tenderness] : non-tender [Abdomen Mass (___ Cm)] : no abdominal mass palpated [Prostate Enlargement] : the prostate was not enlarged [Normal Sphincter Tone] : normal sphincter tone [No Rectal Mass] : no rectal mass [Cervical Lymph Nodes Enlarged Anterior Bilaterally] : anterior cervical [Prostate Tenderness] : the prostate was not tender [Cervical Lymph Nodes Enlarged Posterior Bilaterally] : posterior cervical [Axillary Lymph Nodes Enlarged Bilaterally] : axillary [Supraclavicular Lymph Nodes Enlarged Bilaterally] : supraclavicular [No Spinal Tenderness] : no spinal tenderness [Femoral Lymph Nodes Enlarged Bilaterally] : femoral [Inguinal Lymph Nodes Enlarged Bilaterally] : inguinal [Musculoskeletal - Swelling] : no joint swelling seen [Nail Clubbing] : no clubbing  or cyanosis of the fingernails [Abnormal Walk] : normal gait [Motor Tone] : muscle strength and tone were normal [Skin Color & Pigmentation] : normal skin color and pigmentation [Right Foot Was Examined] : right foot was examined [] : no rash [Skin Turgor] : normal skin turgor [Left Foot Was Examined] : left foot was examined [Normal Appearance] : normal in appearance [Normal in Appearance] : normal in appearance [Normal] : normal [2+] : 2+ in the posterior tibialis [Oriented To Time, Place, And Person] : oriented to person, place, and time [Impaired Insight] : insight and judgment were intact [No Focal Deficits] : no focal deficits [Affect] : the affect was normal [FreeTextEntry1] : small umbilical hernia [#1 Diminished] : number 1 was normal [#4 Diminished] : number 4 was normal [#2 Diminished] : number 2 was normal [#3 Diminished] : number 3 was normal [#5 Diminished] : number 5 was normal [#6 Diminished] : number 6 was normal [#7 Diminished] : number 7 was normal [#8 Diminished] : number 8 was normal [#10 Diminished] : number 10 was normal [#9 Diminished] : number 9 was normal

## 2020-09-11 NOTE — HEALTH RISK ASSESSMENT
[No falls in past year] : Patient reported no falls in the past year [0] : 1) Little interest or pleasure doing things: Not at all (0) [# Of Children ___] : has [unfilled] children [Smoke Detector] : smoke detector [Carbon Monoxide Detector] : carbon monoxide detector [Sunscreen] : uses sunscreen [Seat Belt] :  uses seat belt [Reviewed no changes] : Reviewed no changes [Discussed at today's visit] : Advance Directives Discussed at today's visit [Designated Healthcare Proxy] : Designated healthcare proxy [Name: ___] : Health Care Proxy's Name: [unfilled]  [Good] : ~his/her~ current health as good [Very Good] : ~his/her~  mood as very good [Yes] : Yes [2 - 3 times a week (3 pts)] : 2 - 3  times a week (3 points) [1 or 2 (0 pts)] : 1 or 2 (0 points) [Never (0 pts)] : Never (0 points) [No] : In the past 12 months have you used drugs other than those required for medical reasons? No [With Significant Other] : lives with significant other [College] : College [Employed] : employed [] :  [Fully functional (bathing, dressing, toileting, transferring, walking, feeding)] : Fully functional (bathing, dressing, toileting, transferring, walking, feeding) [Fully functional (using the telephone, shopping, preparing meals, housekeeping, doing laundry, using] : Fully functional and needs no help or supervision to perform IADLs (using the telephone, shopping, preparing meals, housekeeping, doing laundry, using transportation, managing medications and managing finances) [No Retinopathy] : No retinopathy [de-identified] : exercising daily [] : No [Audit-CScore] : 3 [[id number]] : 0 [de-identified] : good [Reports changes in hearing] : Reports no changes in hearing [EyeExamDate] : 07/20 [Reports changes in vision] : Reports no changes in vision [Reports changes in dental health] : Reports no changes in dental health [AdvancecareDate] : 09/20

## 2020-09-11 NOTE — COUNSELING
[None] : None [Good understanding] : Patient has a good understanding of disease, goals and obesity follow-up plan [de-identified] : Continue diet and exercise

## 2020-09-14 ENCOUNTER — TRANSCRIPTION ENCOUNTER (OUTPATIENT)
Age: 65
End: 2020-09-14

## 2020-09-14 LAB
25(OH)D3 SERPL-MCNC: 22.6 NG/ML
ALBUMIN SERPL ELPH-MCNC: 5 G/DL
ALP BLD-CCNC: 78 U/L
ALT SERPL-CCNC: 34 U/L
ANION GAP SERPL CALC-SCNC: 14 MMOL/L
APPEARANCE: CLEAR
AST SERPL-CCNC: 21 U/L
BACTERIA: NEGATIVE
BASOPHILS # BLD AUTO: 0.07 K/UL
BASOPHILS NFR BLD AUTO: 1.3 %
BILIRUB SERPL-MCNC: 0.3 MG/DL
BILIRUBIN URINE: NEGATIVE
BLOOD URINE: NEGATIVE
BUN SERPL-MCNC: 22 MG/DL
CALCIUM SERPL-MCNC: 11.4 MG/DL
CHLORIDE SERPL-SCNC: 100 MMOL/L
CHOLEST SERPL-MCNC: 144 MG/DL
CHOLEST/HDLC SERPL: 4.6 RATIO
CO2 SERPL-SCNC: 22 MMOL/L
COLOR: NORMAL
CREAT SERPL-MCNC: 1.01 MG/DL
CREAT SPEC-SCNC: 167 MG/DL
EOSINOPHIL # BLD AUTO: 0.31 K/UL
EOSINOPHIL NFR BLD AUTO: 6 %
ESTIMATED AVERAGE GLUCOSE: 183 MG/DL
GLUCOSE QUALITATIVE U: NORMAL
GLUCOSE SERPL-MCNC: 203 MG/DL
HBA1C MFR BLD HPLC: 8 %
HCT VFR BLD CALC: 40.9 %
HDLC SERPL-MCNC: 31 MG/DL
HGB BLD-MCNC: 13.8 G/DL
HYALINE CASTS: 1 /LPF
IMM GRANULOCYTES NFR BLD AUTO: 0.2 %
KETONES URINE: NEGATIVE
LDLC SERPL CALC-MCNC: NORMAL MG/DL
LEUKOCYTE ESTERASE URINE: NEGATIVE
LYMPHOCYTES # BLD AUTO: 1.84 K/UL
LYMPHOCYTES NFR BLD AUTO: 35.4 %
MAN DIFF?: NORMAL
MCHC RBC-ENTMCNC: 30.1 PG
MCHC RBC-ENTMCNC: 33.7 GM/DL
MCV RBC AUTO: 89.1 FL
MICROALBUMIN 24H UR DL<=1MG/L-MCNC: 4 MG/DL
MICROALBUMIN/CREAT 24H UR-RTO: 24 MG/G
MICROSCOPIC-UA: NORMAL
MONOCYTES # BLD AUTO: 0.42 K/UL
MONOCYTES NFR BLD AUTO: 8.1 %
NEUTROPHILS # BLD AUTO: 2.55 K/UL
NEUTROPHILS NFR BLD AUTO: 49 %
NITRITE URINE: NEGATIVE
PH URINE: 5
PLATELET # BLD AUTO: 219 K/UL
POTASSIUM SERPL-SCNC: 4.5 MMOL/L
PROT SERPL-MCNC: 7.2 G/DL
PROTEIN URINE: NORMAL
RBC # BLD: 4.59 M/UL
RBC # FLD: 12.4 %
RED BLOOD CELLS URINE: 0 /HPF
SARS-COV-2 IGG SERPL IA-ACNC: <0.1 INDEX
SARS-COV-2 IGG SERPL QL IA: NEGATIVE
SODIUM SERPL-SCNC: 135 MMOL/L
SPECIFIC GRAVITY URINE: 1.02
SQUAMOUS EPITHELIAL CELLS: 1 /HPF
TRIGL SERPL-MCNC: 417 MG/DL
URATE SERPL-MCNC: 4.7 MG/DL
UROBILINOGEN URINE: NORMAL
VIT B12 SERPL-MCNC: 337 PG/ML
WBC # FLD AUTO: 5.2 K/UL
WHITE BLOOD CELLS URINE: 1 /HPF

## 2020-10-26 ENCOUNTER — RX RENEWAL (OUTPATIENT)
Age: 65
End: 2020-10-26

## 2020-10-28 ENCOUNTER — NON-APPOINTMENT (OUTPATIENT)
Age: 65
End: 2020-10-28

## 2020-10-28 RX ORDER — COLCHICINE 0.6 MG/1
0.6 CAPSULE ORAL 3 TIMES DAILY
Qty: 50 | Refills: 0 | Status: DISCONTINUED | COMMUNITY
Start: 2020-10-27 | End: 2020-10-28

## 2020-12-02 ENCOUNTER — APPOINTMENT (OUTPATIENT)
Dept: INTERNAL MEDICINE | Facility: CLINIC | Age: 65
End: 2020-12-02
Payer: MEDICARE

## 2020-12-02 VITALS
HEIGHT: 70 IN | RESPIRATION RATE: 13 BRPM | HEART RATE: 72 BPM | SYSTOLIC BLOOD PRESSURE: 122 MMHG | OXYGEN SATURATION: 98 % | TEMPERATURE: 97.4 F | WEIGHT: 193 LBS | DIASTOLIC BLOOD PRESSURE: 80 MMHG | BODY MASS INDEX: 27.63 KG/M2

## 2020-12-02 PROCEDURE — G0009: CPT

## 2020-12-02 PROCEDURE — 99213 OFFICE O/P EST LOW 20 MIN: CPT | Mod: 25

## 2020-12-02 PROCEDURE — 90732 PPSV23 VACC 2 YRS+ SUBQ/IM: CPT

## 2020-12-02 PROCEDURE — 90750 HZV VACC RECOMBINANT IM: CPT | Mod: GY

## 2020-12-02 PROCEDURE — 90472 IMMUNIZATION ADMIN EACH ADD: CPT

## 2020-12-02 PROCEDURE — 36415 COLL VENOUS BLD VENIPUNCTURE: CPT

## 2020-12-03 LAB
24R-OH-CALCIDIOL SERPL-MCNC: 69.1 PG/ML
ALBUMIN SERPL ELPH-MCNC: 4.9 G/DL
ALP BLD-CCNC: 97 U/L
ALT SERPL-CCNC: 27 U/L
ANION GAP SERPL CALC-SCNC: 13 MMOL/L
AST SERPL-CCNC: 18 U/L
BASOPHILS # BLD AUTO: 0.06 K/UL
BASOPHILS NFR BLD AUTO: 1.3 %
BILIRUB SERPL-MCNC: 0.3 MG/DL
BUN SERPL-MCNC: 23 MG/DL
CALCIUM SERPL-MCNC: 11.5 MG/DL
CHLORIDE SERPL-SCNC: 98 MMOL/L
CHOLEST SERPL-MCNC: 166 MG/DL
CO2 SERPL-SCNC: 24 MMOL/L
CREAT SERPL-MCNC: 1.11 MG/DL
EOSINOPHIL # BLD AUTO: 0.29 K/UL
EOSINOPHIL NFR BLD AUTO: 6.1 %
ESTIMATED AVERAGE GLUCOSE: 214 MG/DL
GLUCOSE SERPL-MCNC: 270 MG/DL
HBA1C MFR BLD HPLC: 9.1 %
HCT VFR BLD CALC: 41.9 %
HDLC SERPL-MCNC: 30 MG/DL
HGB BLD-MCNC: 14.6 G/DL
IMM GRANULOCYTES NFR BLD AUTO: 0.2 %
LDLC SERPL CALC-MCNC: NORMAL MG/DL
LYMPHOCYTES # BLD AUTO: 1.38 K/UL
LYMPHOCYTES NFR BLD AUTO: 29.2 %
MAGNESIUM SERPL-MCNC: 1.6 MG/DL
MAN DIFF?: NORMAL
MCHC RBC-ENTMCNC: 30.9 PG
MCHC RBC-ENTMCNC: 34.8 GM/DL
MCV RBC AUTO: 88.8 FL
MONOCYTES # BLD AUTO: 0.36 K/UL
MONOCYTES NFR BLD AUTO: 7.6 %
NEUTROPHILS # BLD AUTO: 2.62 K/UL
NEUTROPHILS NFR BLD AUTO: 55.6 %
NONHDLC SERPL-MCNC: 137 MG/DL
PLATELET # BLD AUTO: 231 K/UL
POTASSIUM SERPL-SCNC: 5.4 MMOL/L
PROT SERPL-MCNC: 7.2 G/DL
RBC # BLD: 4.72 M/UL
RBC # FLD: 12.1 %
SODIUM SERPL-SCNC: 135 MMOL/L
TRIGL SERPL-MCNC: 614 MG/DL
TSH SERPL-ACNC: 1.04 UIU/ML
URATE SERPL-MCNC: 5.3 MG/DL
WBC # FLD AUTO: 4.72 K/UL

## 2020-12-04 NOTE — ASSESSMENT
[FreeTextEntry1] : Shingrix #2/pneumo given w/o reaction.Venipuncture done in our office, Labs sent out/ further recommendations will be made based on lab results. Patient advised continue present medications with diet/exercise and specialist followup. Patient will return to the office in 3-4months\par \par last colonoscopy was 8/2017-next in 5 years\par Last bone density was 1/2020\par specialists include\par 1. Orthopedic prn-Dr. Parekh\par 2. Dermatology-Dr. Terry\par 3. Ophthalmology-Dr. Alvarez =1/2020\par 4. Dr. joya\par 5. Neurology-Dr. Hughes-unhappy with him- to change-not pursuing follow up at this time\par 6.GI-\par 7.Cardio-Dr. Iyer\par 8.Endocrine-Dr. Ascencio\par 9.ENT-Dr. Torres-to change back to Dr. Solomon\par vaccines are UTD\par MA 9/2020\par 24 hour urine "to do"\par Echo 10/2018\par

## 2020-12-15 PROBLEM — Z12.11 ENCOUNTER FOR SCREENING COLONOSCOPY: Status: RESOLVED | Noted: 2017-08-23 | Resolved: 2020-12-15

## 2020-12-28 ENCOUNTER — APPOINTMENT (OUTPATIENT)
Dept: INTERNAL MEDICINE | Facility: CLINIC | Age: 65
End: 2020-12-28
Payer: MEDICARE

## 2020-12-28 VITALS
HEART RATE: 92 BPM | WEIGHT: 191 LBS | BODY MASS INDEX: 27.35 KG/M2 | TEMPERATURE: 97.3 F | HEIGHT: 70 IN | SYSTOLIC BLOOD PRESSURE: 125 MMHG | RESPIRATION RATE: 13 BRPM | OXYGEN SATURATION: 98 % | DIASTOLIC BLOOD PRESSURE: 80 MMHG

## 2020-12-28 LAB
ANION GAP SERPL CALC-SCNC: 10 MMOL/L
BUN SERPL-MCNC: 25 MG/DL
CALCIUM SERPL-MCNC: 12 MG/DL
CHLORIDE SERPL-SCNC: 101 MMOL/L
CO2 SERPL-SCNC: 22 MMOL/L
CREAT SERPL-MCNC: 1.13 MG/DL
GLUCOSE SERPL-MCNC: 254 MG/DL
POTASSIUM SERPL-SCNC: 4.8 MMOL/L
SODIUM SERPL-SCNC: 133 MMOL/L

## 2020-12-28 PROCEDURE — 36415 COLL VENOUS BLD VENIPUNCTURE: CPT

## 2020-12-28 PROCEDURE — 99213 OFFICE O/P EST LOW 20 MIN: CPT | Mod: CS,25

## 2020-12-28 NOTE — ASSESSMENT
[FreeTextEntry1] : RVP sent out but suspicion low for covid, no signs of bacterial infection.Venipuncture done in our office, Labs sent out/ further recommendations will be made based on lab results. Patient advised continue present medications with diet/exercise and specialist followup. Patient will return to the office as sched for reg check\par \par Dr. Mcgrath was present in office building while I examined patient\par \par \par last colonoscopy was 8/2017-next in 5 years\par Last bone density was 1/2020\par specialists include\par 1. Orthopedic prn-Dr. Parekh\par 2. Dermatology-Dr. Terry\par 3. Ophthalmology-Dr. Alvarez =1/2020\par 4. Dr. joya\par 5. Neurology-Dr. Hughes-unhappy with him- to change-not pursuing follow up at this time\par 6.GI-\par 7.Cardio-Dr. Iyer\par 8.Endocrine-Dr. Ascencio\par 9.ENT-Dr. Torres-to change back to Dr. Solomon\par vaccines are UTD\par MA 9/2020\par 24 hour urine "to do"\par Echo 10/2018\par

## 2020-12-28 NOTE — HISTORY OF PRESENT ILLNESS
[FreeTextEntry1] : Patient presents for followup on hypertension/repeat labs. Patient is currently on Diovan/toprol for hypertension and was found to have K elevated at 5.4 with last labs, abnormal results d/w pt and questions answered.\par -Metformin was increased to 1000mg BID due to UC T2DM\par -pt is complaining of cough for one week. Patient states he notices it most in the morning. Patient states it can be productive or dry. Patient has no associated congestion/postnasal drip/fever/dyspnea or covid exposure. Patient thinks today is slightly improved

## 2020-12-29 ENCOUNTER — TRANSCRIPTION ENCOUNTER (OUTPATIENT)
Age: 65
End: 2020-12-29

## 2020-12-29 ENCOUNTER — NON-APPOINTMENT (OUTPATIENT)
Age: 65
End: 2020-12-29

## 2020-12-29 LAB
RAPID RVP RESULT: NOT DETECTED
SARS-COV-2 RNA PNL RESP NAA+PROBE: NOT DETECTED

## 2021-01-20 ENCOUNTER — RX RENEWAL (OUTPATIENT)
Age: 66
End: 2021-01-20

## 2021-02-12 ENCOUNTER — TRANSCRIPTION ENCOUNTER (OUTPATIENT)
Age: 66
End: 2021-02-12

## 2021-02-22 ENCOUNTER — NON-APPOINTMENT (OUTPATIENT)
Age: 66
End: 2021-02-22

## 2021-03-01 ENCOUNTER — APPOINTMENT (OUTPATIENT)
Dept: INTERNAL MEDICINE | Facility: CLINIC | Age: 66
End: 2021-03-01
Payer: MEDICARE

## 2021-03-01 VITALS
TEMPERATURE: 97.2 F | DIASTOLIC BLOOD PRESSURE: 80 MMHG | HEART RATE: 78 BPM | BODY MASS INDEX: 27.26 KG/M2 | WEIGHT: 190 LBS | SYSTOLIC BLOOD PRESSURE: 120 MMHG | RESPIRATION RATE: 14 BRPM

## 2021-03-01 VITALS
HEART RATE: 66 BPM | OXYGEN SATURATION: 97 % | WEIGHT: 190 LBS | TEMPERATURE: 97.2 F | BODY MASS INDEX: 27.2 KG/M2 | HEIGHT: 70 IN

## 2021-03-01 PROCEDURE — 99213 OFFICE O/P EST LOW 20 MIN: CPT | Mod: 25

## 2021-03-01 PROCEDURE — 36415 COLL VENOUS BLD VENIPUNCTURE: CPT

## 2021-03-01 RX ORDER — METFORMIN HYDROCHLORIDE 500 MG/1
500 TABLET, COATED ORAL
Qty: 180 | Refills: 2 | Status: DISCONTINUED | COMMUNITY
Start: 2021-01-20 | End: 2021-03-01

## 2021-03-02 LAB
25(OH)D3 SERPL-MCNC: 29.5 NG/ML
ALBUMIN SERPL ELPH-MCNC: 4.4 G/DL
ALP BLD-CCNC: 71 U/L
ALT SERPL-CCNC: 26 U/L
ANION GAP SERPL CALC-SCNC: 11 MMOL/L
AST SERPL-CCNC: 21 U/L
BASOPHILS # BLD AUTO: 0.05 K/UL
BASOPHILS NFR BLD AUTO: 1.1 %
BILIRUB SERPL-MCNC: 0.3 MG/DL
BUN SERPL-MCNC: 22 MG/DL
CALCIUM SERPL-MCNC: 11.3 MG/DL
CHLORIDE SERPL-SCNC: 104 MMOL/L
CHOLEST SERPL-MCNC: 120 MG/DL
CO2 SERPL-SCNC: 21 MMOL/L
CREAT SERPL-MCNC: 1.16 MG/DL
EOSINOPHIL # BLD AUTO: 0.31 K/UL
EOSINOPHIL NFR BLD AUTO: 6.8 %
ESTIMATED AVERAGE GLUCOSE: 203 MG/DL
GLUCOSE SERPL-MCNC: 189 MG/DL
HBA1C MFR BLD HPLC: 8.7 %
HCT VFR BLD CALC: 39.9 %
HDLC SERPL-MCNC: 28 MG/DL
HGB BLD-MCNC: 13.6 G/DL
IMM GRANULOCYTES NFR BLD AUTO: 0.2 %
LDLC SERPL CALC-MCNC: 28 MG/DL
LYMPHOCYTES # BLD AUTO: 1.55 K/UL
LYMPHOCYTES NFR BLD AUTO: 33.9 %
MAGNESIUM SERPL-MCNC: 1.4 MG/DL
MAN DIFF?: NORMAL
MCHC RBC-ENTMCNC: 30.2 PG
MCHC RBC-ENTMCNC: 34.1 GM/DL
MCV RBC AUTO: 88.5 FL
MONOCYTES # BLD AUTO: 0.32 K/UL
MONOCYTES NFR BLD AUTO: 7 %
NEUTROPHILS # BLD AUTO: 2.33 K/UL
NEUTROPHILS NFR BLD AUTO: 51 %
NONHDLC SERPL-MCNC: 92 MG/DL
PLATELET # BLD AUTO: 205 K/UL
POTASSIUM SERPL-SCNC: 4.7 MMOL/L
PROT SERPL-MCNC: 6.7 G/DL
RBC # BLD: 4.51 M/UL
RBC # FLD: 12.4 %
SODIUM SERPL-SCNC: 136 MMOL/L
TRIGL SERPL-MCNC: 320 MG/DL
TSH SERPL-ACNC: 1.4 UIU/ML
URATE SERPL-MCNC: 4.6 MG/DL
WBC # FLD AUTO: 4.57 K/UL

## 2021-03-03 ENCOUNTER — NON-APPOINTMENT (OUTPATIENT)
Age: 66
End: 2021-03-03

## 2021-03-03 NOTE — ADDENDUM
[FreeTextEntry1] : Labs show\par -Type 2 diabetes uncontrolled-recommend additional medication=was not taking metformin correctly, to start taking AD\par -Calcium level of 11.3-following with endocrinology\par -Mag level decreased to 1.4-increase supplementation to b.i.d.\par -Vitamin D decreased to 29.5-increase vitamin D supplementation, check next

## 2021-03-03 NOTE — ASSESSMENT
[FreeTextEntry1] : Venipuncture done in our office, Labs sent out/ further recommendations will be made based on lab results. Patient advised continue present medications with diet/exercise and specialist followup. Patient will return to the office in 3months\par \par \par last colonoscopy was 8/2017-next in 5 years\par Last bone density was 1/2020\par specialists include\par 1. Orthopedic prn-Dr. Parekh\par 2. Dermatology-Dr. Terry\par 3. Ophthalmology-Dr. Alvarez =to call for report\par 4. Dr. joya\par 5. Neurology-Dr. Hughes-unhappy with him- to change-not pursuing follow up at this time\par 6.GI-\par 7.Cardio-Dr. Iyer\par 8.Endocrine-Dr. Ascencio\par 9.ENT-Dr. Torres-to change back to Dr. Solomon\par vaccines are UTD\par MA 9/2020\par 24 hour urine "to do"\par Echo 10/2018\par

## 2021-04-15 ENCOUNTER — RX RENEWAL (OUTPATIENT)
Age: 66
End: 2021-04-15

## 2021-06-14 ENCOUNTER — RX RENEWAL (OUTPATIENT)
Age: 66
End: 2021-06-14

## 2021-07-29 ENCOUNTER — RX RENEWAL (OUTPATIENT)
Age: 66
End: 2021-07-29

## 2021-08-10 ENCOUNTER — RX RENEWAL (OUTPATIENT)
Age: 66
End: 2021-08-10

## 2021-09-15 ENCOUNTER — RX RENEWAL (OUTPATIENT)
Age: 66
End: 2021-09-15

## 2021-09-24 ENCOUNTER — RX RENEWAL (OUTPATIENT)
Age: 66
End: 2021-09-24

## 2021-09-28 NOTE — HISTORY OF PRESENT ILLNESS
[FreeTextEntry1] : 66-year-old male with history of hypertension for which he takes Avapro, hyperlipidemia on Crestor and gout on allopurinol present for his yearly physical.\par Patient also with history of type 2 diabetes which was diagnosed February 2016.\par \par The patient had cardiac evaluation 2018 showing a significantly increased PVC burden with patient started on metoprolol.\par Workup included a cardiac catheterization showing severe stenosis of the right coronary artery with stenting and mild disease of the LAD and circumflex.\par He has done well since and continues on aspirin and Plavix. He had side effects from Brilinta.\par \par The patient was diagnosed with hyperparathyroidism and had a parathyroidectomy March 2017.\par No surgical complications but the patient's calcium remains mildly elevated.\par He had a reexploration August 2018 with continued hypercalcemia and further testing recommended by  which the patient did not want to do.\par \par He currently is being followed by endocrinology for hyperparathyroidism and type 2 diabetes.\par \par Part of the preoperative evaluation showed the patient to have a left neck mass with biopsy showing it to be a Schwannoma\par This is also being followed.\par \par As a result of that biopsy the patient got significant headaches which have resolved but continues with paresthesias of his feet for which he was seen in neurology but was dissatisfied.\par The paresthesias have persisted.\par Secondary to persistent symptoms endocrinology recently started patient on gabapentin 100 mg at bedtime with improvement\par \par The patient has been compliant with his medications and feeling well without any symptoms including no chest pain, palpitations or shortness of breath. His gout has been quiet and he has not used any colchicine for any gouty attacks.\par \par

## 2021-09-28 NOTE — HEALTH RISK ASSESSMENT
[Good] : ~his/her~ current health as good [Very Good] : ~his/her~  mood as very good [] : No [Yes] : Yes [2 - 3 times a week (3 pts)] : 2 - 3  times a week (3 points) [1 or 2 (0 pts)] : 1 or 2 (0 points) [No] : In the past 12 months have you used drugs other than those required for medical reasons? No [Never (0 pts)] : Never (0 points) [No falls in past year] : Patient reported no falls in the past year [0] : 2) Feeling down, depressed, or hopeless: Not at all (0) [Audit-CScore] : 3 [de-identified] : exercising daily [de-identified] : good [No Retinopathy] : No retinopathy [[id number]] : 0 [EyeExamDate] : 07/20 [With Significant Other] : lives with significant other [Employed] : employed [College] : College [] :  [# Of Children ___] : has [unfilled] children [Fully functional (bathing, dressing, toileting, transferring, walking, feeding)] : Fully functional (bathing, dressing, toileting, transferring, walking, feeding) [Fully functional (using the telephone, shopping, preparing meals, housekeeping, doing laundry, using] : Fully functional and needs no help or supervision to perform IADLs (using the telephone, shopping, preparing meals, housekeeping, doing laundry, using transportation, managing medications and managing finances) [Reports changes in hearing] : Reports no changes in hearing [Reports changes in vision] : Reports no changes in vision [Reports changes in dental health] : Reports no changes in dental health [Smoke Detector] : smoke detector [Carbon Monoxide Detector] : carbon monoxide detector [Seat Belt] :  uses seat belt [Sunscreen] : uses sunscreen [Discussed at today's visit] : Advance Directives Discussed at today's visit [Designated Healthcare Proxy] : Designated healthcare proxy [Name: ___] : Health Care Proxy's Name: [unfilled]

## 2021-09-28 NOTE — PHYSICAL EXAM
[General Appearance - Alert] : alert [General Appearance - In No Acute Distress] : in no acute distress [Sclera] : the sclera and conjunctiva were normal [Extraocular Movements] : extraocular movements were intact [PERRL With Normal Accommodation] : pupils were equal in size, round, and reactive to light [Outer Ear] : the ears and nose were normal in appearance [Oropharynx] : the oropharynx was normal [Neck Appearance] : the appearance of the neck was normal [Jugular Venous Distention Increased] : there was no jugular-venous distention [Neck Cervical Mass (___cm)] : no neck mass was observed [Thyroid Diffuse Enlargement] : the thyroid was not enlarged [Thyroid Nodule] : there were no palpable thyroid nodules [Auscultation Breath Sounds / Voice Sounds] : lungs were clear to auscultation bilaterally [Heart Rate And Rhythm] : heart rate was normal and rhythm regular [Heart Sounds] : normal S1 and S2 [Heart Sounds Gallop] : no gallops [Murmurs] : no murmurs [Heart Sounds Pericardial Friction Rub] : no pericardial rub [Arterial Pulses Carotid] : carotid pulses were normal with no bruits [Arterial Pulses Femoral] : femoral pulses were normal without bruits [Edema] : there was no peripheral edema [Full Pulse] : the pedal pulses are present [Veins - Varicosity Changes] : there were no varicosital changes [Bowel Sounds] : normal bowel sounds [Abdomen Soft] : soft [Abdomen Tenderness] : non-tender [Abdomen Mass (___ Cm)] : no abdominal mass palpated [FreeTextEntry1] : small umbilical hernia [Normal Sphincter Tone] : normal sphincter tone [No Rectal Mass] : no rectal mass [Prostate Enlargement] : the prostate was not enlarged [Prostate Tenderness] : the prostate was not tender [Cervical Lymph Nodes Enlarged Posterior Bilaterally] : posterior cervical [Cervical Lymph Nodes Enlarged Anterior Bilaterally] : anterior cervical [Supraclavicular Lymph Nodes Enlarged Bilaterally] : supraclavicular [Axillary Lymph Nodes Enlarged Bilaterally] : axillary [Femoral Lymph Nodes Enlarged Bilaterally] : femoral [Inguinal Lymph Nodes Enlarged Bilaterally] : inguinal [No Spinal Tenderness] : no spinal tenderness [Abnormal Walk] : normal gait [Nail Clubbing] : no clubbing  or cyanosis of the fingernails [Motor Tone] : muscle strength and tone were normal [Musculoskeletal - Swelling] : no joint swelling seen [Skin Color & Pigmentation] : normal skin color and pigmentation [Skin Turgor] : normal skin turgor [] : no rash [Right Foot Was Examined] : right foot was examined [Left Foot Was Examined] : left foot was examined [Normal Appearance] : normal in appearance [Normal in Appearance] : normal in appearance [Normal] : normal [2+] : 2+ in the dorsalis pedis [#1 Diminished] : number 1 was normal [#2 Diminished] : number 2 was normal [#3 Diminished] : number 3 was normal [#4 Diminished] : number 4 was normal [#5 Diminished] : number 5 was normal [#6 Diminished] : number 6 was normal [#7 Diminished] : number 7 was normal [#8 Diminished] : number 8 was normal [#9 Diminished] : number 9 was normal [#10 Diminished] : number 10 was normal [No Focal Deficits] : no focal deficits [Oriented To Time, Place, And Person] : oriented to person, place, and time [Impaired Insight] : insight and judgment were intact [Affect] : the affect was normal

## 2021-09-28 NOTE — COUNSELING
[Good understanding] : Patient has a good understanding of disease, goals and obesity follow-up plan [None] : None [de-identified] : Continue diet and exercise

## 2021-09-28 NOTE — ASSESSMENT
[FreeTextEntry1] : 66-year-old male diagnosed with hypertension PVCs 2018 with improvement on beta blocker.\par Also significant RCA stenosis with stenting and mild disease of the LAD and circumflex.\par Cardiologically stable.\par \par controlled hypertension on Avapro. \par \par Patient with history of significant hyperlipidemia with good result with Crestor. \par \par His gout is quiet with keeping his uric acid down with allopurinol and has had no gouty attacks.\par \par Patient with diagnosed type 2 diabetes 2017 with hopeful control of his sugar.\par \par The patient is status post parathyroidectomy March 2017 but with continued hypercalcemia.\par Reexploration August 2018 with continued hypercalcemia and now followed by endocrinology\par \par \par Patient also with neck schwannoma which is also being followed by Dr Aranda\par \par Patient's fatigue likely secondary BB.\par \par Patient encouraged to improve his diet with exercise which may help his energy level.\par \par Therefore he is to continue his present medications\par \par Influenza vaccine given left deltoid\par Shingrix #1 given right deltoid\par \par Colonoscopy September 2016 with followup in 10 years\par Ophthalmology yearly\par \par Venipuncture done today in the office\par \par Followup 3 months with Pneumovax and Shingrix #2

## 2021-09-29 ENCOUNTER — APPOINTMENT (OUTPATIENT)
Dept: INTERNAL MEDICINE | Facility: CLINIC | Age: 66
End: 2021-09-29
Payer: MEDICARE

## 2021-09-29 ENCOUNTER — NON-APPOINTMENT (OUTPATIENT)
Age: 66
End: 2021-09-29

## 2021-09-29 VITALS
HEIGHT: 70 IN | WEIGHT: 184 LBS | BODY MASS INDEX: 26.34 KG/M2 | TEMPERATURE: 97.2 F | RESPIRATION RATE: 12 BRPM | DIASTOLIC BLOOD PRESSURE: 80 MMHG | HEART RATE: 68 BPM | SYSTOLIC BLOOD PRESSURE: 130 MMHG

## 2021-09-29 DIAGNOSIS — Z23 ENCOUNTER FOR IMMUNIZATION: ICD-10-CM

## 2021-09-29 DIAGNOSIS — D36.10 BENIGN NEOPLASM OF PERIPHERAL NERVES AND AUTONOMIC NERVOUS SYSTEM, UNSPECIFIED: ICD-10-CM

## 2021-09-29 PROCEDURE — G0439: CPT

## 2021-09-29 PROCEDURE — 90662 IIV NO PRSV INCREASED AG IM: CPT

## 2021-09-29 PROCEDURE — G0008: CPT

## 2021-09-29 PROCEDURE — 36415 COLL VENOUS BLD VENIPUNCTURE: CPT

## 2021-09-29 NOTE — HISTORY OF PRESENT ILLNESS
[FreeTextEntry1] : 66-year-old male with history of hypertension for which he takes Avapro, hyperlipidemia on Crestor and gout on allopurinol present for his yearly physical.\par Patient also with history of type 2 diabetes which was diagnosed February 2016.For this he is on metformin\par \par The patient had cardiac evaluation 2018 showing a significantly increased PVC burden with patient started on metoprolol.\par Workup included a cardiac catheterization showing severe stenosis of the right coronary artery with stenting and mild disease of the LAD and circumflex.\par He has done well since and continues on aspirin and Plavix. He had side effects from Brilinta.\par He saw cardiology February 2021 we'll plan stress test which he did not followup with\par \par The patient was diagnosed with hyperparathyroidism and had a parathyroidectomy March 2017.\par No surgical complications but the patient's calcium remains mildly elevated.\par He had a reexploration August 2018 with continued hypercalcemia and further testing recommended by  which the patient did not want to do.\par \par He currently is being followed by endocrinology for hyperparathyroidism and type 2 diabetes.\par \par Part of the preoperative evaluation showed the patient to have a left neck mass with biopsy showing it to be a Schwannoma\par This and his parathyroid were being followed by Dr. Aranda but the patient has not seen him in about 4 years.\par \par As a result of that biopsy the patient got significant headaches which have resolved but continues with paresthesias of his feet for which he was seen in neurology but was dissatisfied.\par The paresthesias have persisted.\par Secondary to persistent symptoms endocrinology started patient on gabapentin 100 mg at bedtime with improvement\par \par He no longer followed with endocrinology\par \par The patient has been compliant with his medications and feeling well without any symptoms including no chest pain, palpitations or shortness of breath. 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

## 2021-09-29 NOTE — ASSESSMENT
[FreeTextEntry1] : 66-year-old male diagnosed with hypertension PVCs 2018 with improvement on beta blocker.\par Also significant RCA stenosis with stenting and mild disease of the LAD and circumflex.\par Cardiologically stable.\par Patient told to followup with cardiology and get testing done as recommended\par \par controlled hypertension on Avapro. \par \par Patient with history of significant hyperlipidemia with good result with Crestor. \par \par His gout is quiet with keeping his uric acid down with allopurinol and has had no gouty attacks.\par \par Patient with diagnosed type 2 diabetes 2017 o Metformin with hopeful control of his sugar.\par \par The patient is status post parathyroidectomy March 2017 but with continued hypercalcemia.\par Reexploration August 2018 with continued hypercalcemia and previously followed by endocrinology.\par Patient also with neck schwannoma which is also being followed by Dr Olsen the patient had not seen him though had imaging done in about 4 years therefore referral given and told he needs to followup here\par \par Patient encouraged to improve his diet with exercise which may help his energy level.\par \par Therefore he is to continue his present medications\par \par High-dose influenza vaccine given left deltoid\par All vaccines up to date including COVID\par \par Colonoscopy September 2016 with followup in 10 years\par Ophthalmology yearly. Referral given\par Bone density January 2020\par \par Venipuncture done today in the office\par \par Followup 3 months

## 2021-09-29 NOTE — COUNSELING
[Good understanding] : Patient has a good understanding of disease, goals and obesity follow-up plan [None] : None [de-identified] : Continue diet and exercise

## 2021-09-29 NOTE — HEALTH RISK ASSESSMENT
[Good] : ~his/her~ current health as good [Very Good] : ~his/her~  mood as very good [Yes] : Yes [2 - 3 times a week (3 pts)] : 2 - 3  times a week (3 points) [1 or 2 (0 pts)] : 1 or 2 (0 points) [Never (0 pts)] : Never (0 points) [No] : In the past 12 months have you used drugs other than those required for medical reasons? No [No falls in past year] : Patient reported no falls in the past year [0] : 1) Little interest or pleasure doing things: Not at all (0) [No Retinopathy] : No retinopathy [With Significant Other] : lives with significant other [Employed] : employed [College] : College [] :  [# Of Children ___] : has [unfilled] children [Fully functional (bathing, dressing, toileting, transferring, walking, feeding)] : Fully functional (bathing, dressing, toileting, transferring, walking, feeding) [Fully functional (using the telephone, shopping, preparing meals, housekeeping, doing laundry, using] : Fully functional and needs no help or supervision to perform IADLs (using the telephone, shopping, preparing meals, housekeeping, doing laundry, using transportation, managing medications and managing finances) [Smoke Detector] : smoke detector [Carbon Monoxide Detector] : carbon monoxide detector [Seat Belt] :  uses seat belt [Sunscreen] : uses sunscreen [Discussed at today's visit] : Advance Directives Discussed at today's visit [Designated Healthcare Proxy] : Designated healthcare proxy [Name: ___] : Health Care Proxy's Name: [unfilled]  [Reviewed no changes] : Reviewed, no changes [Change in mental status noted] : Change in mental status noted [] : No [Audit-CScore] : 3 [de-identified] : exercising daily [de-identified] : good [[id number]] : 0 [EyeExamDate] : 07/20 [Reports changes in hearing] : Reports no changes in hearing [Reports changes in vision] : Reports no changes in vision [Reports changes in dental health] : Reports no changes in dental health [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

## 2021-09-29 NOTE — PHYSICAL EXAM
[General Appearance - Alert] : alert [General Appearance - In No Acute Distress] : in no acute distress [Sclera] : the sclera and conjunctiva were normal [Extraocular Movements] : extraocular movements were intact [PERRL With Normal Accommodation] : pupils were equal in size, round, and reactive to light [Outer Ear] : the ears and nose were normal in appearance [Oropharynx] : the oropharynx was normal [Neck Appearance] : the appearance of the neck was normal [Jugular Venous Distention Increased] : there was no jugular-venous distention [Neck Cervical Mass (___cm)] : no neck mass was observed [Thyroid Diffuse Enlargement] : the thyroid was not enlarged [Thyroid Nodule] : there were no palpable thyroid nodules [Auscultation Breath Sounds / Voice Sounds] : lungs were clear to auscultation bilaterally [Heart Rate And Rhythm] : heart rate was normal and rhythm regular [Heart Sounds] : normal S1 and S2 [Heart Sounds Gallop] : no gallops [Murmurs] : no murmurs [Heart Sounds Pericardial Friction Rub] : no pericardial rub [Arterial Pulses Carotid] : carotid pulses were normal with no bruits [Arterial Pulses Femoral] : femoral pulses were normal without bruits [Full Pulse] : the pedal pulses are present [Edema] : there was no peripheral edema [Veins - Varicosity Changes] : there were no varicosital changes [Bowel Sounds] : normal bowel sounds [Abdomen Soft] : soft [Abdomen Tenderness] : non-tender [Abdomen Mass (___ Cm)] : no abdominal mass palpated [Normal Sphincter Tone] : normal sphincter tone [No Rectal Mass] : no rectal mass [Prostate Enlargement] : the prostate was not enlarged [Prostate Tenderness] : the prostate was not tender [Cervical Lymph Nodes Enlarged Posterior Bilaterally] : posterior cervical [Cervical Lymph Nodes Enlarged Anterior Bilaterally] : anterior cervical [Supraclavicular Lymph Nodes Enlarged Bilaterally] : supraclavicular [Axillary Lymph Nodes Enlarged Bilaterally] : axillary [Femoral Lymph Nodes Enlarged Bilaterally] : femoral [Inguinal Lymph Nodes Enlarged Bilaterally] : inguinal [No Spinal Tenderness] : no spinal tenderness [Abnormal Walk] : normal gait [Nail Clubbing] : no clubbing  or cyanosis of the fingernails [Musculoskeletal - Swelling] : no joint swelling seen [Skin Color & Pigmentation] : normal skin color and pigmentation [Motor Tone] : muscle strength and tone were normal [Skin Turgor] : normal skin turgor [] : no rash [Right Foot Was Examined] : right foot was examined [Left Foot Was Examined] : left foot was examined [Normal Appearance] : normal in appearance [Normal in Appearance] : normal in appearance [Normal] : normal [2+] : 2+ in the dorsalis pedis [No Focal Deficits] : no focal deficits [Oriented To Time, Place, And Person] : oriented to person, place, and time [Impaired Insight] : insight and judgment were intact [Affect] : the affect was normal [FreeTextEntry1] : small umbilical hernia [#1 Diminished] : number 1 was normal [#2 Diminished] : number 2 was normal [#3 Diminished] : number 3 was normal [#4 Diminished] : number 4 was normal [#5 Diminished] : number 5 was normal [#6 Diminished] : number 6 was normal [#7 Diminished] : number 7 was normal [#8 Diminished] : number 8 was normal [#9 Diminished] : number 9 was normal [#10 Diminished] : number 10 was normal

## 2021-10-03 LAB
25(OH)D3 SERPL-MCNC: 35.2 NG/ML
ALBUMIN SERPL ELPH-MCNC: 5.1 G/DL
ALP BLD-CCNC: 83 U/L
ALT SERPL-CCNC: 23 U/L
ANION GAP SERPL CALC-SCNC: 13 MMOL/L
APPEARANCE: CLEAR
AST SERPL-CCNC: 18 U/L
BACTERIA: NEGATIVE
BASOPHILS # BLD AUTO: 0.06 K/UL
BASOPHILS NFR BLD AUTO: 1.1 %
BILIRUB SERPL-MCNC: 0.2 MG/DL
BILIRUBIN URINE: NEGATIVE
BLOOD URINE: NORMAL
BUN SERPL-MCNC: 16 MG/DL
CALCIUM OXALATE CRYSTALS: ABNORMAL
CALCIUM SERPL-MCNC: 11.5 MG/DL
CALCIUM SERPL-MCNC: 11.5 MG/DL
CHLORIDE SERPL-SCNC: 100 MMOL/L
CHOLEST SERPL-MCNC: 107 MG/DL
CO2 SERPL-SCNC: 22 MMOL/L
COLOR: YELLOW
CREAT SERPL-MCNC: 1.1 MG/DL
CREAT SPEC-SCNC: 221 MG/DL
EOSINOPHIL # BLD AUTO: 0.36 K/UL
EOSINOPHIL NFR BLD AUTO: 6.9 %
ESTIMATED AVERAGE GLUCOSE: 166 MG/DL
GLUCOSE QUALITATIVE U: ABNORMAL
GLUCOSE SERPL-MCNC: 173 MG/DL
HBA1C MFR BLD HPLC: 7.4 %
HCT VFR BLD CALC: 40.4 %
HDLC SERPL-MCNC: 34 MG/DL
HGB BLD-MCNC: 14 G/DL
HYALINE CASTS: 13 /LPF
IMM GRANULOCYTES NFR BLD AUTO: 0.2 %
KETONES URINE: NEGATIVE
LDLC SERPL CALC-MCNC: 14 MG/DL
LEUKOCYTE ESTERASE URINE: NEGATIVE
LYMPHOCYTES # BLD AUTO: 1.7 K/UL
LYMPHOCYTES NFR BLD AUTO: 32.5 %
MAGNESIUM SERPL-MCNC: 1.2 MG/DL
MAN DIFF?: NORMAL
MCHC RBC-ENTMCNC: 31.3 PG
MCHC RBC-ENTMCNC: 34.7 GM/DL
MCV RBC AUTO: 90.2 FL
MICROALBUMIN 24H UR DL<=1MG/L-MCNC: 15.9 MG/DL
MICROALBUMIN/CREAT 24H UR-RTO: 72 MG/G
MICROSCOPIC-UA: NORMAL
MONOCYTES # BLD AUTO: 0.42 K/UL
MONOCYTES NFR BLD AUTO: 8 %
NEUTROPHILS # BLD AUTO: 2.68 K/UL
NEUTROPHILS NFR BLD AUTO: 51.3 %
NITRITE URINE: NEGATIVE
NONHDLC SERPL-MCNC: 74 MG/DL
PARATHYROID HORMONE INTACT: 53 PG/ML
PH URINE: 5.5
PLATELET # BLD AUTO: 217 K/UL
POTASSIUM SERPL-SCNC: 4.7 MMOL/L
PROT SERPL-MCNC: 7.6 G/DL
PROTEIN URINE: ABNORMAL
PSA SERPL-MCNC: 1.47 NG/ML
RBC # BLD: 4.48 M/UL
RBC # FLD: 12.6 %
RED BLOOD CELLS URINE: 1 /HPF
SODIUM SERPL-SCNC: 135 MMOL/L
SPECIFIC GRAVITY URINE: 1.03
SQUAMOUS EPITHELIAL CELLS: 13 /HPF
TRIGL SERPL-MCNC: 300 MG/DL
URATE SERPL-MCNC: 3.8 MG/DL
UROBILINOGEN URINE: ABNORMAL
VIT B12 SERPL-MCNC: 319 PG/ML
WBC # FLD AUTO: 5.23 K/UL
WHITE BLOOD CELLS URINE: 6 /HPF

## 2021-10-15 ENCOUNTER — RX RENEWAL (OUTPATIENT)
Age: 66
End: 2021-10-15

## 2021-10-20 ENCOUNTER — NON-APPOINTMENT (OUTPATIENT)
Age: 66
End: 2021-10-20

## 2021-10-21 DIAGNOSIS — E78.1 PURE HYPERGLYCERIDEMIA: ICD-10-CM

## 2021-12-15 ENCOUNTER — RX RENEWAL (OUTPATIENT)
Age: 66
End: 2021-12-15

## 2021-12-23 ENCOUNTER — RX RENEWAL (OUTPATIENT)
Age: 66
End: 2021-12-23

## 2022-01-03 ENCOUNTER — APPOINTMENT (OUTPATIENT)
Dept: INFECTIOUS DISEASE | Facility: CLINIC | Age: 67
End: 2022-01-03

## 2022-01-07 ENCOUNTER — APPOINTMENT (OUTPATIENT)
Dept: INTERNAL MEDICINE | Facility: CLINIC | Age: 67
End: 2022-01-07
Payer: MEDICARE

## 2022-01-07 DIAGNOSIS — R09.81 NASAL CONGESTION: ICD-10-CM

## 2022-01-07 DIAGNOSIS — R05.9 COUGH, UNSPECIFIED: ICD-10-CM

## 2022-01-07 DIAGNOSIS — U07.1 COVID-19: ICD-10-CM

## 2022-01-07 DIAGNOSIS — Z71.89 OTHER SPECIFIED COUNSELING: ICD-10-CM

## 2022-01-07 PROCEDURE — 99441: CPT | Mod: CS,95

## 2022-01-07 NOTE — PLAN
[FreeTextEntry1] : Patient with COVID infection with relatively mild symptoms which are about 70% improved therefore recommend continued rest and fluids and symptomatic treatment.\par Patient told to call if symptoms persist or worsen.

## 2022-01-07 NOTE — HISTORY OF PRESENT ILLNESS
[Home] : at home, [unfilled] , at the time of the visit. [Medical Office: ([facility])___] : at the medical office located in  [Verbal consent obtained from patient] : the patient, [unfilled] [FreeTextEntry1] : The patient presents for an acute telephonic visit secondary to not feeling well starting on January 1, 2022.\par Symptoms include URI like symptoms of congestion, mild cough and headache with occasional postnasal drip.\par The patient was exposed therefore tested for COVID on January 3, 2022 and is positive.\par No shortness of breath, fever, chills, loss of taste or smell, shortness of breath or chest pain.\par His symptoms continue relatively mild and have improved. By 70% improvement.\par Taking DayQuil with improvement.\par Vaccine x3. [Time Spent: ___ minutes] : I have spent [unfilled] minutes with the patient on the telephone

## 2022-01-10 ENCOUNTER — APPOINTMENT (OUTPATIENT)
Dept: INTERNAL MEDICINE | Facility: CLINIC | Age: 67
End: 2022-01-10

## 2022-01-10 LAB
RAPID RVP RESULT: DETECTED
SARS-COV-2 RNA PNL RESP NAA+PROBE: DETECTED

## 2022-02-17 ENCOUNTER — RESULT REVIEW (OUTPATIENT)
Age: 67
End: 2022-02-17

## 2022-02-17 ENCOUNTER — APPOINTMENT (OUTPATIENT)
Dept: INTERNAL MEDICINE | Facility: CLINIC | Age: 67
End: 2022-02-17
Payer: MEDICARE

## 2022-02-17 VITALS
OXYGEN SATURATION: 98 % | SYSTOLIC BLOOD PRESSURE: 132 MMHG | HEIGHT: 70 IN | DIASTOLIC BLOOD PRESSURE: 80 MMHG | HEART RATE: 98 BPM | TEMPERATURE: 97 F | WEIGHT: 184 LBS | BODY MASS INDEX: 26.34 KG/M2 | RESPIRATION RATE: 13 BRPM

## 2022-02-17 DIAGNOSIS — R20.2 PARESTHESIA OF SKIN: ICD-10-CM

## 2022-02-17 DIAGNOSIS — M65.30 TRIGGER FINGER, UNSPECIFIED FINGER: ICD-10-CM

## 2022-02-17 PROCEDURE — 36415 COLL VENOUS BLD VENIPUNCTURE: CPT

## 2022-02-17 PROCEDURE — 99214 OFFICE O/P EST MOD 30 MIN: CPT | Mod: 25

## 2022-02-18 ENCOUNTER — TRANSCRIPTION ENCOUNTER (OUTPATIENT)
Age: 67
End: 2022-02-18

## 2022-02-18 ENCOUNTER — NON-APPOINTMENT (OUTPATIENT)
Age: 67
End: 2022-02-18

## 2022-02-18 LAB
25(OH)D3 SERPL-MCNC: 25.1 NG/ML
ALBUMIN SERPL ELPH-MCNC: 5.2 G/DL
ALP BLD-CCNC: 95 U/L
ALT SERPL-CCNC: 32 U/L
ANION GAP SERPL CALC-SCNC: 14 MMOL/L
AST SERPL-CCNC: 21 U/L
BASOPHILS # BLD AUTO: 0.05 K/UL
BASOPHILS NFR BLD AUTO: 1 %
BILIRUB SERPL-MCNC: 0.3 MG/DL
BUN SERPL-MCNC: 21 MG/DL
CALCIUM SERPL-MCNC: 12.3 MG/DL
CHLORIDE SERPL-SCNC: 100 MMOL/L
CHOLEST SERPL-MCNC: 123 MG/DL
CO2 SERPL-SCNC: 23 MMOL/L
CREAT SERPL-MCNC: 1.1 MG/DL
EOSINOPHIL # BLD AUTO: 0.26 K/UL
EOSINOPHIL NFR BLD AUTO: 5.2 %
ESTIMATED AVERAGE GLUCOSE: 189 MG/DL
GLUCOSE SERPL-MCNC: 184 MG/DL
HBA1C MFR BLD HPLC: 8.2 %
HCT VFR BLD CALC: 44.3 %
HDLC SERPL-MCNC: 30 MG/DL
HGB BLD-MCNC: 14.9 G/DL
IMM GRANULOCYTES NFR BLD AUTO: 0.4 %
LDLC SERPL CALC-MCNC: 23 MG/DL
LYMPHOCYTES # BLD AUTO: 1.56 K/UL
LYMPHOCYTES NFR BLD AUTO: 31.1 %
MAGNESIUM SERPL-MCNC: 1.5 MG/DL
MAN DIFF?: NORMAL
MCHC RBC-ENTMCNC: 29.9 PG
MCHC RBC-ENTMCNC: 33.6 GM/DL
MCV RBC AUTO: 89 FL
MONOCYTES # BLD AUTO: 0.39 K/UL
MONOCYTES NFR BLD AUTO: 7.8 %
NEUTROPHILS # BLD AUTO: 2.74 K/UL
NEUTROPHILS NFR BLD AUTO: 54.5 %
NONHDLC SERPL-MCNC: 93 MG/DL
PLATELET # BLD AUTO: 252 K/UL
POTASSIUM SERPL-SCNC: 5 MMOL/L
PROT SERPL-MCNC: 7.5 G/DL
RBC # BLD: 4.98 M/UL
RBC # FLD: 12.5 %
SODIUM SERPL-SCNC: 137 MMOL/L
TRIGL SERPL-MCNC: 351 MG/DL
TSH SERPL-ACNC: 1.27 UIU/ML
URATE SERPL-MCNC: 4.5 MG/DL
WBC # FLD AUTO: 5.02 K/UL

## 2022-02-18 RX ORDER — MAGNESIUM OXIDE 241.3 MG/1000MG
400 TABLET ORAL
Refills: 0 | Status: ACTIVE | COMMUNITY
Start: 2022-02-18

## 2022-02-18 RX ORDER — MAGNESIUM CHLORIDE 71.5 MG
71.5-119 TABLET, DELAYED RELEASE (ENTERIC COATED) ORAL
Refills: 0 | Status: DISCONTINUED | COMMUNITY
Start: 2019-04-23 | End: 2022-02-18

## 2022-02-18 NOTE — ADDENDUM
[FreeTextEntry1] : Labs show\par -Type 2 diabetes uncontrolled-recommend additional medication\par -Magnesium level decreased at 1.5-increase magnesium supplementation, check next\par -Calcium elevated/higher, follows with endocrinology-results faxed\par -Vitamin D. decrease- advised to take supplementation daily, check next

## 2022-02-18 NOTE — PHYSICAL EXAM
[General Appearance - In No Acute Distress] : in no acute distress [] : no respiratory distress [Respiration, Rhythm And Depth] : normal respiratory rhythm and effort [Auscultation Breath Sounds / Voice Sounds] : lungs were clear to auscultation bilaterally [Heart Rate And Rhythm] : heart rate was normal and rhythm regular [Affect] : the affect was normal [Mood] : the mood was normal [No Focal Deficits] : no focal deficits [de-identified] : Trigger finger noted left fourth finger [de-identified] : +thenar atrophy noted in bilateral hands, +tinnel at wrist, neck moves well.

## 2022-02-18 NOTE — HISTORY OF PRESENT ILLNESS
[FreeTextEntry1] : Patient presents for followup on hypertension/hyperlipidemia/T2DM. Patient is currently fasting for today's labs. Patient is currently on Diovan/toprol for hypertension, on Crestor for hyperlipidemia and is On metformin for type 2 diabetes\par -got covid vaccines\par -s/p covid, recovered well\par -Patient is complaining of bilateral hand tingling/paresthesias that is fairly constant for at least the past month. Patient states it is starting to affect his function. Patient states his neck feels fine and it is not his whole arm. It occasionally keeps him awake.\par -Patient also has had ongoing paresthesias of both feet, has seen specialist in the past but did not do followup, was on gabapentin which did help but for some reason stopped taking it and would like to restart\par - also complaining that his left fourth finger is occasionally getting stuck and then it clicks open.

## 2022-02-18 NOTE — ASSESSMENT
[FreeTextEntry1] : Patient told to follow up with neurology to include EMG upper and lower extremity, referral given. Hand specialist referral given to treat trigger finger. Gabapentin renewed.Venipuncture done in our office, Labs sent out/ further recommendations will be made based on lab results. Patient advised continue present medications with diet/exercise and specialist followup. Patient will return to the office in 3months\par \par \par Dr. Mcgrath was present in office building while I examined patient\par \par \par last colonoscopy was 8/2017-next in 5 years\par Last bone density was 1/2020=rx given\par specialists include\par 1. Orthopedic prn-Dr. Parekh\par 2. Dermatology-Dr. Terry\par 3. Ophthalmology-Dr. Alvarez ="has sched", due this month\par 4. Dr. joya\par 5. Neurology-referral given\par 6.GI-\par 7.Cardio-Dr. Iyer\par 8.Endocrine-Dr. Ascencio\par 9.ENT-Dr. Torres-to change back to Dr. Solomon\par vaccines are UTD, +got covid vaccines X3\par MA 9/2021\par 24 hour urine "to do"\par Echo via cardio\par

## 2022-03-07 ENCOUNTER — NON-APPOINTMENT (OUTPATIENT)
Age: 67
End: 2022-03-07

## 2022-03-17 ENCOUNTER — RX RENEWAL (OUTPATIENT)
Age: 67
End: 2022-03-17

## 2022-04-06 ENCOUNTER — APPOINTMENT (OUTPATIENT)
Dept: INTERNAL MEDICINE | Facility: CLINIC | Age: 67
End: 2022-04-06

## 2022-04-06 ENCOUNTER — NON-APPOINTMENT (OUTPATIENT)
Age: 67
End: 2022-04-06

## 2022-04-06 ENCOUNTER — APPOINTMENT (OUTPATIENT)
Dept: INTERNAL MEDICINE | Facility: CLINIC | Age: 67
End: 2022-04-06
Payer: MEDICARE

## 2022-04-06 VITALS
DIASTOLIC BLOOD PRESSURE: 80 MMHG | RESPIRATION RATE: 13 BRPM | SYSTOLIC BLOOD PRESSURE: 124 MMHG | HEART RATE: 85 BPM | BODY MASS INDEX: 26.63 KG/M2 | WEIGHT: 186 LBS | OXYGEN SATURATION: 98 % | HEIGHT: 70 IN

## 2022-04-06 DIAGNOSIS — Z01.818 ENCOUNTER FOR OTHER PREPROCEDURAL EXAMINATION: ICD-10-CM

## 2022-04-06 DIAGNOSIS — E11.65 TYPE 2 DIABETES MELLITUS WITH HYPERGLYCEMIA: ICD-10-CM

## 2022-04-06 PROCEDURE — 99214 OFFICE O/P EST MOD 30 MIN: CPT | Mod: 25

## 2022-04-06 PROCEDURE — 93000 ELECTROCARDIOGRAM COMPLETE: CPT

## 2022-04-06 NOTE — ASSESSMENT
[No Further Testing Recommended] : no further testing recommended [Modify anti-platelet treatment prior to procedure] : Modify anti-platelet treatment prior to procedure [FreeTextEntry4] : 66-year-old male currently medically stable with no contraindication to planned low risk procedure.\par \par CAD status post RCA stenting currently stable with no evidence of cardiac decompensation. The patient continues on Plavix therefore told to call cardiology to be sure it's okay for him to stop it before his procedure.\par \par History of ventricular bigeminy controlled with metoprolol\par \par Hypertension controlled\par \par type 2 diabetes under fair control\par  [FreeTextEntry6] : Stoppage oil one week prior to the procedure. Stop Plavix one week prior to the procedure as long as okay with cardiology

## 2022-04-06 NOTE — HISTORY OF PRESENT ILLNESS
[Coronary Artery Disease] : coronary artery disease [No Pertinent Pulmonary History] : no history of asthma, COPD, sleep apnea, or smoking [Good (7-10 METs)] : Good (7-10 METs) [Aortic Stenosis] : no aortic stenosis [Atrial Fibrillation] : no atrial fibrillation [Recent Myocardial Infarction] : no recent myocardial infarction [Implantable Device/Pacemaker] : no implantable device/pacemaker [Chronic Anticoagulation] : no chronic anticoagulation [Chronic Kidney Disease] : no chronic kidney disease [FreeTextEntry1] : Right carpal tunnel syndrome repair [FreeTextEntry2] : April 13, 2022 [FreeTextEntry3] : Dr. Vicente [FreeTextEntry4] : 66-year-old male presents for medical evaluation prior to right carpal tunnel syndrome repair\par \par Cardiac history includes high level of PVCs therefore is on metoprolol. Also history of coronary artery disease status post stenting to the right coronary artery with patient continuing on Plavix.\par \par Patient's history also includes hypertension,gout, hyperlipidemia and type 2 diabetes.\par History also includes a Schwannomma in the neck which is being monitored.\par Also history of hyperparathyroidism status post parathyroidectomy\par \par Overall the patient is feeling well without complaints including no chest pain, palpitations, shortness of breath or edema.

## 2022-04-15 DIAGNOSIS — R80.9 PROTEINURIA, UNSPECIFIED: ICD-10-CM

## 2022-04-18 ENCOUNTER — NON-APPOINTMENT (OUTPATIENT)
Age: 67
End: 2022-04-18

## 2022-04-19 LAB
CREAT 24H UR-MCNC: 1.2 G/24 H
CREAT ?TM UR-MCNC: 56 MG/DL
PROT 24H UR-MRATE: 19 MG/DL
PROT ?TM UR-MCNC: 24 HR
PROT UR-MCNC: 399 MG/24 H
SPECIMEN VOL 24H UR: 2100 ML

## 2022-04-20 ENCOUNTER — NON-APPOINTMENT (OUTPATIENT)
Age: 67
End: 2022-04-20

## 2022-04-20 ENCOUNTER — RX RENEWAL (OUTPATIENT)
Age: 67
End: 2022-04-20

## 2022-04-22 ENCOUNTER — NON-APPOINTMENT (OUTPATIENT)
Age: 67
End: 2022-04-22

## 2022-05-05 ENCOUNTER — APPOINTMENT (OUTPATIENT)
Dept: NEUROLOGY | Facility: CLINIC | Age: 67
End: 2022-05-05

## 2022-05-12 ENCOUNTER — APPOINTMENT (OUTPATIENT)
Dept: INTERNAL MEDICINE | Facility: CLINIC | Age: 67
End: 2022-05-12
Payer: MEDICARE

## 2022-05-12 VITALS
RESPIRATION RATE: 14 BRPM | TEMPERATURE: 97 F | HEIGHT: 70 IN | DIASTOLIC BLOOD PRESSURE: 80 MMHG | HEART RATE: 70 BPM | SYSTOLIC BLOOD PRESSURE: 125 MMHG | BODY MASS INDEX: 25.77 KG/M2 | WEIGHT: 180 LBS

## 2022-05-12 PROCEDURE — 99214 OFFICE O/P EST MOD 30 MIN: CPT | Mod: 25

## 2022-05-12 PROCEDURE — 36415 COLL VENOUS BLD VENIPUNCTURE: CPT

## 2022-05-12 RX ORDER — ASPIRIN ENTERIC COATED TABLETS 81 MG 81 MG/1
81 TABLET, DELAYED RELEASE ORAL
Refills: 0 | Status: ACTIVE | COMMUNITY
Start: 2022-05-12

## 2022-05-13 ENCOUNTER — RX RENEWAL (OUTPATIENT)
Age: 67
End: 2022-05-13

## 2022-05-13 LAB
25(OH)D3 SERPL-MCNC: 29.4 NG/ML
ALBUMIN SERPL ELPH-MCNC: 4.9 G/DL
ALP BLD-CCNC: 96 U/L
ALT SERPL-CCNC: 18 U/L
ANION GAP SERPL CALC-SCNC: 15 MMOL/L
AST SERPL-CCNC: 16 U/L
BASOPHILS # BLD AUTO: 0.06 K/UL
BASOPHILS NFR BLD AUTO: 1.1 %
BILIRUB SERPL-MCNC: <0.2 MG/DL
BUN SERPL-MCNC: 27 MG/DL
CALCIUM SERPL-MCNC: 11.6 MG/DL
CALCIUM SERPL-MCNC: 11.6 MG/DL
CHLORIDE SERPL-SCNC: 103 MMOL/L
CHOLEST SERPL-MCNC: 105 MG/DL
CO2 SERPL-SCNC: 19 MMOL/L
CREAT SERPL-MCNC: 1.24 MG/DL
EGFR: 63 ML/MIN/1.73M2
EOSINOPHIL # BLD AUTO: 0.41 K/UL
EOSINOPHIL NFR BLD AUTO: 7.5 %
ESTIMATED AVERAGE GLUCOSE: 180 MG/DL
GLUCOSE SERPL-MCNC: 186 MG/DL
HBA1C MFR BLD HPLC: 7.9 %
HCT VFR BLD CALC: 38.9 %
HDLC SERPL-MCNC: 24 MG/DL
HGB BLD-MCNC: 13.5 G/DL
IMM GRANULOCYTES NFR BLD AUTO: 0.2 %
LDLC SERPL CALC-MCNC: NORMAL MG/DL
LYMPHOCYTES # BLD AUTO: 1.76 K/UL
LYMPHOCYTES NFR BLD AUTO: 32.4 %
MAGNESIUM SERPL-MCNC: 1.2 MG/DL
MAN DIFF?: NORMAL
MCHC RBC-ENTMCNC: 30.1 PG
MCHC RBC-ENTMCNC: 34.7 GM/DL
MCV RBC AUTO: 86.6 FL
MONOCYTES # BLD AUTO: 0.48 K/UL
MONOCYTES NFR BLD AUTO: 8.8 %
NEUTROPHILS # BLD AUTO: 2.72 K/UL
NEUTROPHILS NFR BLD AUTO: 50 %
NONHDLC SERPL-MCNC: 81 MG/DL
PARATHYROID HORMONE INTACT: 57 PG/ML
PLATELET # BLD AUTO: 260 K/UL
POTASSIUM SERPL-SCNC: 4.9 MMOL/L
PROT SERPL-MCNC: 7.1 G/DL
RBC # BLD: 4.49 M/UL
RBC # FLD: 12.6 %
SODIUM SERPL-SCNC: 137 MMOL/L
TRIGL SERPL-MCNC: 467 MG/DL
TSH SERPL-ACNC: 1.5 UIU/ML
URATE SERPL-MCNC: 4.6 MG/DL
WBC # FLD AUTO: 5.44 K/UL

## 2022-05-13 NOTE — ADDENDUM
[FreeTextEntry1] : Labs show\par -A1c-not controlled=rec add rx\par -CA Of 11.6=sched to see endo for recs\par -Mag of 1.2=start MAg supplement\par -Vit D again low, increase VIt D supplement, check next

## 2022-05-13 NOTE — ASSESSMENT
[FreeTextEntry1] : Venipuncture done in our office, Labs sent out/ further recommendations will be made based on lab results. Patient advised continue present medications with diet/exercise and specialist followup. Patient will return to the office in sept for CP\par \par \par last colonoscopy was 8/2017-next in 5 years=rx given, due 8/2022\par Last bone density was 1/2020="to do"-rx given\par specialists include\par 1. Orthopedic prn-Dr. Parekh/Dr. Macdonald\par 2. Dermatology-Dr. Terry\par 3. Ophthalmology-Dr. Alvarez =3/2022\par 4. Head and neck MD-Dr. Aranda=no longer goes\par 5.GI-\par 6.Cardio-Dr. Iyer\par 7.Endocrine-Dr. Ascencio=no longer goes\par 8.ENT-Dr. Torres-to change back to Dr. Solomon\par 9. Parathyroid specialist/endocrine-to see Dr. Rushing\par vaccines are UTD, +got covid vaccines X3\par MA 9/2021\par 24 hour urine=4/2022\par Echo via cardio\par

## 2022-05-13 NOTE — HISTORY OF PRESENT ILLNESS
[FreeTextEntry1] : Patient presents for followup on hypertension/hyperlipidemia/T2DM. Patient is currently fasting for today's labs. Patient is currently on Diovan/toprol for hypertension, on Crestor for hyperlipidemia and is On metformin for type 2 diabetes\par -got covid vaccines\par -status post carpal tunnel surgery on the right, went ok, to do left in future\par - Scheduled to see parathyroid specialist on 5/19\par -cardio told pt to restart on ASA

## 2022-05-19 ENCOUNTER — APPOINTMENT (OUTPATIENT)
Dept: ENDOCRINOLOGY | Facility: CLINIC | Age: 67
End: 2022-05-19
Payer: MEDICARE

## 2022-05-19 VITALS — WEIGHT: 184 LBS | HEIGHT: 70 IN | BODY MASS INDEX: 26.34 KG/M2

## 2022-05-19 VITALS — HEART RATE: 95 BPM | SYSTOLIC BLOOD PRESSURE: 116 MMHG | OXYGEN SATURATION: 98 % | DIASTOLIC BLOOD PRESSURE: 82 MMHG

## 2022-05-19 LAB — GLUCOSE BLDC GLUCOMTR-MCNC: 175

## 2022-05-19 PROCEDURE — 99215 OFFICE O/P EST HI 40 MIN: CPT | Mod: 25

## 2022-05-19 PROCEDURE — 99205 OFFICE O/P NEW HI 60 MIN: CPT | Mod: 25

## 2022-05-19 PROCEDURE — 82962 GLUCOSE BLOOD TEST: CPT

## 2022-05-19 NOTE — REVIEW OF SYSTEMS
[Decreased Appetite] : decreased appetite [Joint Pain] : joint pain [Muscle Weakness] : muscle weakness [Muscle Cramps] : muscle cramps [As Noted in HPI] : as noted in HPI [Negative] : Neurological

## 2022-05-19 NOTE — PHYSICAL EXAM
[Alert] : alert [Healthy Appearance] : healthy appearance [No Acute Distress] : no acute distress [Well Developed] : well developed [Normal Sclera/Conjunctiva] : normal sclera/conjunctiva [EOMI] : extra ocular movement intact [No Neck Mass] : no neck mass was observed [Thyroid Not Enlarged] : the thyroid was not enlarged [No Thyroid Nodules] : no palpable thyroid nodules [No Respiratory Distress] : no respiratory distress [Clear to Auscultation] : lungs were clear to auscultation bilaterally [Normal S1, S2] : normal S1 and S2 [Normal Rate] : heart rate was normal [Regular Rhythm] : with a regular rhythm [No Edema] : no peripheral edema [Pedal Pulses Normal] : the pedal pulses are present [Not Tender] : non-tender [Not Distended] : not distended [Soft] : abdomen soft [Normal Supraclavicular Nodes] : no supraclavicular lymphadenopathy [Normal Anterior Cervical Nodes] : no anterior cervical lymphadenopathy [Normal Posterior Cervical Nodes] : no posterior cervical lymphadenopathy [Normal Gait] : normal gait [No Rash] : no rash [No Skin Lesions] : no skin lesions [Acanthosis Nigricans] : no acanthosis nigricans [Normal] : normal [2+] : 2+ in the dorsalis pedis [Diminished Throughout Both Feet] : normal tactile sensation with monofilament testing throughout both feet [No Tremors] : no tremors [Oriented x3] : oriented to person, place, and time [Normal Affect] : the affect was normal [Normal Insight/Judgement] : insight and judgment were intact [Normal Mood] : the mood was normal [de-identified] : Right hand s/p carpal-tunel repair. Decreased strength.

## 2022-05-19 NOTE — ASSESSMENT
[Diabetes Foot Care] : diabetes foot care [Importance of Diet and Exercise] : importance of diet and exercise to improve glycemic control, achieve weight loss and improve cardiovascular health [Exercise/Effect on Glucose] : exercise/effect on glucose [FreeTextEntry1] : 66 y.o. male with PMH of T2D (Dx 2018), Hypercalcemia, pHPT, Gout presents for evaluation and management of DM and Hypercalcemia. pHPT Dx few year ago (?2017) patient underwent  parathyroidectomy and consequent revision with Dr. Aranda but hypercalcemia did not resolve. Ca+ ranging 10.9 - 11.4 with PTHi of mid 50s. Patient denies constipation or polyuria but reports morning muscle stiffness and left wrist pain. He recently had right carpal-tunel Sx. He also c/o fingers burning pains and thigling. \par In regards to DMx since Dx in 2018 he is on Metformin 1000 mg BID. \par Does not check SMBG regularly, usually pre dinner 130 - 170\par A1C ranging 7.2 to 8.7%. Most recent 7.9%. \par Denies personal Hx of cardiac disease or stroke. \par \par # Hx of pHPT\par s/p remote parathyroidectomy, unsuccessful as per the patient. Unclear for me how many glands were taken.\par As per patient Dr. Aranda offer him exploratory intervention to look for other locations, but patient declined. \par Serum Ca+ now mildly elevated 10.9 (corrected) with high normal PTH\par No Hx of renal stones\par Will obtain renal US\par Will collect 24h urine Ca\par Patient advised to keep good hydration at least 6 to 8 glasses of water daily\par Might consider Sensipar. \par Last DEXA - only osteopenia -1.7 in forearm. FRAX 10y risk for major opt fx is 6% and hip fx is 0.5%\par Will consider repeating  DEXA\par \par # DM\par A1C 7.9%\par Discussed Dietary and Lifestyle modification\par Patient likes to bike but now unable d/t hand surgery\par Encouraged vigorous walks at least 30 min 3 x week\par Will refer to CDE for more input on diabetes, diet etc.\par As of now continue Metformin 1000 mg BID. \par Patient provided log sheets. Instructed to check FS at least 2 x day\par On the next visit after re-evaluation we might consider adding second oral agent. \par \par #HLD\par Elevated TGR noted 467\par Advised low fat cholesterol diet\par Continue Rosuvastatin 20 mg daily\par Vascepa 1mg 2 caps daily\par \par # HTN\par Controlled with Metoprolol 25 mg BID\par Advised low salt diet\par \par RTC in 2 months. \par

## 2022-05-19 NOTE — REASON FOR VISIT
[Initial Evaluation] : an initial evaluation [DM Type 2] : DM Type 2 [Hypercalcemia] : hypercalcemia

## 2022-05-19 NOTE — HISTORY OF PRESENT ILLNESS
[FreeTextEntry1] : 66 y.o. male with PMH of T2D (Dx 2018), Hypercalcemia, pHPT, Gout presents for evaluation and management of DM and Hypercalcemia. pHPT Dx few year ago (?2017) patient underwent  parathyroidectomy and consequent revision with Dr. Aranda but hypercalcemia did not resolve. Ca+ ranging 10.9 - 11.4 with PTHi of mid 50s. Patient denies constipation or polyuria but reports morning muscle stiffness and left wrist pain. He recently had right carpal-tunel Sx. He also c/o fingers burning pains and thigling. \par In regards to DMx since Dx in 2018 he is on Metformin 1000 mg BID. A1C ranging 7.2 to 8.7%. Most recent 7.9%. Denies personal Hx of cardiac disease or stroke.

## 2022-06-06 ENCOUNTER — APPOINTMENT (OUTPATIENT)
Dept: ENDOCRINOLOGY | Facility: CLINIC | Age: 67
End: 2022-06-06
Payer: MEDICARE

## 2022-06-06 PROCEDURE — G0108 DIAB MANAGE TRN  PER INDIV: CPT

## 2022-06-20 ENCOUNTER — NON-APPOINTMENT (OUTPATIENT)
Age: 67
End: 2022-06-20

## 2022-06-29 ENCOUNTER — APPOINTMENT (OUTPATIENT)
Dept: INTERNAL MEDICINE | Facility: CLINIC | Age: 67
End: 2022-06-29

## 2022-06-29 VITALS
TEMPERATURE: 97 F | DIASTOLIC BLOOD PRESSURE: 80 MMHG | OXYGEN SATURATION: 98 % | WEIGHT: 182 LBS | HEART RATE: 68 BPM | SYSTOLIC BLOOD PRESSURE: 124 MMHG | HEIGHT: 70 IN | RESPIRATION RATE: 13 BRPM | BODY MASS INDEX: 26.05 KG/M2

## 2022-06-29 DIAGNOSIS — M25.50 PAIN IN UNSPECIFIED JOINT: ICD-10-CM

## 2022-06-29 DIAGNOSIS — T88.7XXA UNSPECIFIED ADVERSE EFFECT OF DRUG OR MEDICAMENT, INITIAL ENCOUNTER: ICD-10-CM

## 2022-06-29 PROCEDURE — 99214 OFFICE O/P EST MOD 30 MIN: CPT | Mod: 25

## 2022-06-29 PROCEDURE — 36415 COLL VENOUS BLD VENIPUNCTURE: CPT

## 2022-06-29 RX ORDER — METFORMIN HYDROCHLORIDE 1000 MG/1
1000 TABLET, COATED ORAL
Qty: 180 | Refills: 1 | Status: DISCONTINUED | COMMUNITY
Start: 2019-03-18 | End: 2022-06-29

## 2022-06-29 NOTE — PHYSICAL EXAM
[No Acute Distress] : no acute distress [No Respiratory Distress] : no respiratory distress  [Clear to Auscultation] : lungs were clear to auscultation bilaterally [Normal Rate] : normal rate  [Regular Rhythm] : with a regular rhythm [Normal Affect] : the affect was normal [Normal Mood] : the mood was normal [No Joint Swelling] : no joint swelling [No Focal Deficits] : no focal deficits

## 2022-07-06 ENCOUNTER — NON-APPOINTMENT (OUTPATIENT)
Age: 67
End: 2022-07-06

## 2022-07-06 ENCOUNTER — TRANSCRIPTION ENCOUNTER (OUTPATIENT)
Age: 67
End: 2022-07-06

## 2022-07-06 LAB
A PHAGOCYTOPH IGG TITR SER IF: NORMAL TITER
ALBUMIN SERPL ELPH-MCNC: 5.4 G/DL
ALP BLD-CCNC: 115 U/L
ALT SERPL-CCNC: 28 U/L
ANA PAT FLD IF-IMP: ABNORMAL
ANA SER IF-ACNC: ABNORMAL
ANION GAP SERPL CALC-SCNC: 15 MMOL/L
AST SERPL-CCNC: 21 U/L
B BURGDOR AB SER QL IA: NEGATIVE
B MICROTI IGG TITR SER: NORMAL TITER
BASOPHILS # BLD AUTO: 0.04 K/UL
BASOPHILS NFR BLD AUTO: 0.8 %
BILIRUB SERPL-MCNC: 0.3 MG/DL
BUN SERPL-MCNC: 21 MG/DL
CALCIUM SERPL-MCNC: 12.3 MG/DL
CHLORIDE SERPL-SCNC: 101 MMOL/L
CO2 SERPL-SCNC: 19 MMOL/L
CREAT SERPL-MCNC: 1.07 MG/DL
CRP SERPL-MCNC: <3 MG/L
E CHAFFEENSIS IGG TITR SER IF: NORMAL TITER
EGFR: 76 ML/MIN/1.73M2
EOSINOPHIL # BLD AUTO: 0.25 K/UL
EOSINOPHIL NFR BLD AUTO: 4.8 %
ERYTHROCYTE [SEDIMENTATION RATE] IN BLOOD BY WESTERGREN METHOD: 12 MM/HR
GLUCOSE SERPL-MCNC: 189 MG/DL
HCT VFR BLD CALC: 40.3 %
HGB BLD-MCNC: 14.1 G/DL
IMM GRANULOCYTES NFR BLD AUTO: 0.2 %
LYMPHOCYTES # BLD AUTO: 1.51 K/UL
LYMPHOCYTES NFR BLD AUTO: 29 %
MAN DIFF?: NORMAL
MCHC RBC-ENTMCNC: 31 PG
MCHC RBC-ENTMCNC: 35 GM/DL
MCV RBC AUTO: 88.6 FL
MONOCYTES # BLD AUTO: 0.42 K/UL
MONOCYTES NFR BLD AUTO: 8.1 %
NEUTROPHILS # BLD AUTO: 2.98 K/UL
NEUTROPHILS NFR BLD AUTO: 57.1 %
PLATELET # BLD AUTO: 238 K/UL
POTASSIUM SERPL-SCNC: 5 MMOL/L
PROT SERPL-MCNC: 7.6 G/DL
RBC # BLD: 4.55 M/UL
RBC # FLD: 13.1 %
RHEUMATOID FACT SER QL: 10 IU/ML
SODIUM SERPL-SCNC: 136 MMOL/L
WBC # FLD AUTO: 5.21 K/UL

## 2022-07-06 NOTE — ASSESSMENT
[FreeTextEntry1] : Venipuncture done in our office, Labs sent out. Change metformin to jardiance. Told patient to followup with endocrinology regarding hypercalcemia and see if that is contributing to aches and pains. We will also consider trial off of Crestor if rheumatologic blood work is normal versus rheumatology evaluation. Further recommendations to follow. Patient will return to the office as scheduled for regular followup\par \par \par attending M.D. available by phone if needed

## 2022-07-06 NOTE — HISTORY OF PRESENT ILLNESS
[FreeTextEntry8] : Patient presents stating\par \par 1. He has had GI upset mainly diarrhea and nausea since increasing metformin. Patient has decreased to one tablet daily and states symptoms are better but would like medication changed. Patient has not tried any other diabetic medication in the past\par \par 2.Patient also states that he is riddled with aches and pains. Patient feels in his hips/knees and hands. Patient has difficulty opening a bottle,patient has had recent carpal time of repair.Patient has been intolerant to statins in the past, currently on Crestor

## 2022-07-06 NOTE — ADDENDUM
[FreeTextEntry1] : Labs show\par -CA of 12.3-faxed to endo\par -NOEL +, refer to rheum to eval\par -consider trial off statin, lab did not run CPK

## 2022-07-07 ENCOUNTER — RX RENEWAL (OUTPATIENT)
Age: 67
End: 2022-07-07

## 2022-07-18 NOTE — H&P PST ADULT - MUSCULOSKELETAL
details… rolling walker detailed exam normal strength/no joint warmth/ROM intact/no joint swelling/no joint erythema/no calf tenderness

## 2022-07-25 ENCOUNTER — APPOINTMENT (OUTPATIENT)
Dept: ENDOCRINOLOGY | Facility: CLINIC | Age: 67
End: 2022-07-25

## 2022-07-25 VITALS
HEIGHT: 70 IN | TEMPERATURE: 97.2 F | WEIGHT: 180 LBS | BODY MASS INDEX: 25.77 KG/M2 | DIASTOLIC BLOOD PRESSURE: 70 MMHG | SYSTOLIC BLOOD PRESSURE: 110 MMHG

## 2022-07-25 PROCEDURE — 99215 OFFICE O/P EST HI 40 MIN: CPT

## 2022-07-25 PROCEDURE — 82962 GLUCOSE BLOOD TEST: CPT

## 2022-07-25 NOTE — ASSESSMENT
[FreeTextEntry1] : 66 y.o. male with PMH of T2D (Dx 2018), Hypercalcemia, PHPT, Gout presents for follow up for management of DM and Hypercalcemia. PHPT Dx few year ago (?2017) patient underwent  parathyroidectomy and consequent revision with Dr. Aranda but hypercalcemia did not resolve. Ca+ ranging 10.9 - 11.4 with PTHi of mid 50s. \par  \par DM since 2018 was on Metformin 1000 mg BID but recently stopped and started on Jardiance\par Last A1C 7.9%. \par \par # Hx of PHPT\par s/p remote parathyroidectomy, unsuccessful as per the patient. Unclear how many glands were taken.\par Dr. Aranda offer him exploratory intervention to look for other locations, but patient declined. \par Last Serum Ca+ mildly elevated 10.9 (corrected) with high normal PTH\par Patient did not do blood work and urine collection for today's visit. \par No Hx of renal stones, Renal US - pending\par 24h urine Ca - pending \par Might consider Sensipar. \par Last DEXA 2014 - only osteopenia -1.7 in forearm. FRAX 10y risk for major opt fx is 6% and hip fx is 0.5%\par New DEXA - pending \par Patient will obtain blood work and 24h Urine upon next visit\par Advised to keep good hydration at least 6 to 8 glasses of water daily\par \par # DM\par A1C 7.9%\par Metformin recently d/c due to GI symptoms. Started on Jardiance by PCP\par SMBG uncontrolled fasting 145 - 214\par Will add Glimepiride 2 mg daily \par Continue physical activities with walks at least 30 min 3 x week\par \par #HLD\par Elevated TGR noted 467 on the last visit. No new labs\par Rosuvastatin was stopped d/t cramps by PCP and currently off medications. On diet only\par In process of obtaining approval for Crestor - worked well for him in the past\par Advised low fat/cholesterol diet\par \par # HTN\par Controlled with Metoprolol 25 mg BID\par Advised low salt diet\par \par RTC in 2 months. \par

## 2022-07-25 NOTE — PHYSICAL EXAM
[Alert] : alert [Healthy Appearance] : healthy appearance [No Acute Distress] : no acute distress [Well Developed] : well developed [Normal Sclera/Conjunctiva] : normal sclera/conjunctiva [EOMI] : extra ocular movement intact [No Neck Mass] : no neck mass was observed [Thyroid Not Enlarged] : the thyroid was not enlarged [No Thyroid Nodules] : no palpable thyroid nodules [No Respiratory Distress] : no respiratory distress [Clear to Auscultation] : lungs were clear to auscultation bilaterally [Normal S1, S2] : normal S1 and S2 [Normal Rate] : heart rate was normal [Regular Rhythm] : with a regular rhythm [No Edema] : no peripheral edema [Pedal Pulses Normal] : the pedal pulses are present [Not Tender] : non-tender [Not Distended] : not distended [Soft] : abdomen soft [Normal Supraclavicular Nodes] : no supraclavicular lymphadenopathy [Normal Anterior Cervical Nodes] : no anterior cervical lymphadenopathy [Normal Gait] : normal gait [No Rash] : no rash [No Skin Lesions] : no skin lesions [Acanthosis Nigricans] : no acanthosis nigricans [Normal] : normal [2+] : 2+ in the dorsalis pedis [Diminished Throughout Both Feet] : normal tactile sensation with monofilament testing throughout both feet [No Tremors] : no tremors [Oriented x3] : oriented to person, place, and time [Normal Affect] : the affect was normal [Normal Insight/Judgement] : insight and judgment were intact [Normal Mood] : the mood was normal [de-identified] : Right hand s/p carpal-tunel repair. Decreased strength.

## 2022-07-25 NOTE — HISTORY OF PRESENT ILLNESS
[FreeTextEntry1] : 66 y.o. male with PMH of T2D (Dx 2018), Hypercalcemia, pHPT, Gout presents initially for evaluation and management of DM and Hypercalcemia. PHPT Dx few year ago (?2017) patient underwent  parathyroidectomy and consequent revision with Dr. Aranda but hypercalcemia did not resolve. Ca+ ranging 10.9 - 11.4 with PTHi of mid 50s. Patient denies constipation or polyuria but reports morning muscle stiffness and left wrist pain. He recently had right carpal-tunel Sx.

## 2022-07-26 LAB — GLUCOSE BLDC GLUCOMTR-MCNC: 170

## 2022-08-12 ENCOUNTER — RX RENEWAL (OUTPATIENT)
Age: 67
End: 2022-08-12

## 2022-08-17 ENCOUNTER — NON-APPOINTMENT (OUTPATIENT)
Age: 67
End: 2022-08-17

## 2022-10-19 ENCOUNTER — RX RENEWAL (OUTPATIENT)
Age: 67
End: 2022-10-19

## 2022-10-24 ENCOUNTER — APPOINTMENT (OUTPATIENT)
Dept: INTERNAL MEDICINE | Facility: CLINIC | Age: 67
End: 2022-10-24

## 2022-10-24 VITALS
HEIGHT: 70 IN | WEIGHT: 192 LBS | DIASTOLIC BLOOD PRESSURE: 60 MMHG | RESPIRATION RATE: 13 BRPM | HEART RATE: 84 BPM | BODY MASS INDEX: 27.49 KG/M2 | SYSTOLIC BLOOD PRESSURE: 110 MMHG | OXYGEN SATURATION: 97 %

## 2022-10-24 DIAGNOSIS — Z12.5 ENCOUNTER FOR SCREENING FOR MALIGNANT NEOPLASM OF PROSTATE: ICD-10-CM

## 2022-10-24 DIAGNOSIS — Z13.31 ENCOUNTER FOR SCREENING FOR DEPRESSION: ICD-10-CM

## 2022-10-24 PROCEDURE — G0008: CPT

## 2022-10-24 PROCEDURE — G0444 DEPRESSION SCREEN ANNUAL: CPT

## 2022-10-24 PROCEDURE — G0439: CPT

## 2022-10-24 PROCEDURE — 90662 IIV NO PRSV INCREASED AG IM: CPT

## 2022-10-24 PROCEDURE — 36415 COLL VENOUS BLD VENIPUNCTURE: CPT

## 2022-10-24 RX ORDER — ROSUVASTATIN CALCIUM 20 MG/1
20 TABLET, FILM COATED ORAL
Qty: 90 | Refills: 1 | Status: DISCONTINUED | COMMUNITY
Start: 2017-05-01 | End: 2022-10-24

## 2022-10-24 NOTE — COUNSELING
[Good understanding] : Patient has a good understanding of disease, goals and obesity follow-up plan [None] : None [de-identified] : Continue diet and exercise

## 2022-10-24 NOTE — PHYSICAL EXAM
[General Appearance - Alert] : alert [General Appearance - In No Acute Distress] : in no acute distress [Sclera] : the sclera and conjunctiva were normal [PERRL With Normal Accommodation] : pupils were equal in size, round, and reactive to light [Extraocular Movements] : extraocular movements were intact [Outer Ear] : the ears and nose were normal in appearance [Oropharynx] : the oropharynx was normal [Neck Appearance] : the appearance of the neck was normal [Neck Cervical Mass (___cm)] : no neck mass was observed [Jugular Venous Distention Increased] : there was no jugular-venous distention [Thyroid Diffuse Enlargement] : the thyroid was not enlarged [Thyroid Nodule] : there were no palpable thyroid nodules [Heart Rate And Rhythm] : heart rate was normal and rhythm regular [Auscultation Breath Sounds / Voice Sounds] : lungs were clear to auscultation bilaterally [Heart Sounds] : normal S1 and S2 [Heart Sounds Gallop] : no gallops [Murmurs] : no murmurs [Heart Sounds Pericardial Friction Rub] : no pericardial rub [Arterial Pulses Carotid] : carotid pulses were normal with no bruits [Arterial Pulses Femoral] : femoral pulses were normal without bruits [Full Pulse] : the pedal pulses are present [Edema] : there was no peripheral edema [Veins - Varicosity Changes] : there were no varicosital changes [Bowel Sounds] : normal bowel sounds [Abdomen Soft] : soft [Abdomen Tenderness] : non-tender [Abdomen Mass (___ Cm)] : no abdominal mass palpated [Normal Sphincter Tone] : normal sphincter tone [No Rectal Mass] : no rectal mass [Prostate Enlargement] : the prostate was not enlarged [Cervical Lymph Nodes Enlarged Posterior Bilaterally] : posterior cervical [Prostate Tenderness] : the prostate was not tender [Cervical Lymph Nodes Enlarged Anterior Bilaterally] : anterior cervical [Supraclavicular Lymph Nodes Enlarged Bilaterally] : supraclavicular [Axillary Lymph Nodes Enlarged Bilaterally] : axillary [Femoral Lymph Nodes Enlarged Bilaterally] : femoral [Inguinal Lymph Nodes Enlarged Bilaterally] : inguinal [No Spinal Tenderness] : no spinal tenderness [Abnormal Walk] : normal gait [Nail Clubbing] : no clubbing  or cyanosis of the fingernails [Musculoskeletal - Swelling] : no joint swelling seen [Motor Tone] : muscle strength and tone were normal [Skin Color & Pigmentation] : normal skin color and pigmentation [Skin Turgor] : normal skin turgor [] : no rash [Right Foot Was Examined] : right foot was examined [Left Foot Was Examined] : left foot was examined [Normal Appearance] : normal in appearance [Normal in Appearance] : normal in appearance [Normal] : normal [2+] : 2+ in the dorsalis pedis [No Focal Deficits] : no focal deficits [Oriented To Time, Place, And Person] : oriented to person, place, and time [Impaired Insight] : insight and judgment were intact [Affect] : the affect was normal [FreeTextEntry1] : small umbilical hernia [#1 Diminished] : number 1 was normal [#2 Diminished] : number 2 was normal [#3 Diminished] : number 3 was normal [#4 Diminished] : number 4 was normal [#5 Diminished] : number 5 was normal [#6 Diminished] : number 6 was normal [#7 Diminished] : number 7 was normal [#8 Diminished] : number 8 was normal [#9 Diminished] : number 9 was normal [#10 Diminished] : number 10 was normal

## 2022-10-24 NOTE — ASSESSMENT
[FreeTextEntry1] : 67-year-old male diagnosed with hypertension PVCs 2018 with improvement on beta blocker.\par Also significant RCA stenosis with stenting and mild disease of the LAD and circumflex.\par Cardiologically stable.\par \par Hyperlipidemia with significant hypertriglyceridemia with initial triglycerides about 1300.\par Definitely improved with rosuvastatin with significant side effects which have resolved since he stopped it about a month ago. He had had no side effects with branded Crestor.\par Will attempt to get rid Crestor covered by insurance\par \par controlled hypertension on valsartan\par \par His gout is quiet with keeping his uric acid down with allopurinol and has had no gouty attacks.\par \par Patient with diagnosed type 2 diabetes 2017 on Jardiance and glimepiride. followed by endocrinology who is also following patient with history of hyperparathyroidism\par \par Patient also with neck schwannoma which is also being followed by Dr Gutierrez but the patient had not seen him though had imaging done in about 4 years therefore referral given and told he needs to followup here\par \par Patient encouraged to improve his diet with exercise which may help his energy level.\par \par Therefore he is to continue his present medications\par \par High-dose influenza vaccine given left deltoid\par All vaccines up to date including COVID\par \par Colonoscopy September 2016 with followup in 10 years\par Ophthalmology yearly. Referral given\par Bone density January 2020\par \par Venipuncture done today in the office\par \par Followup 3 months

## 2022-10-24 NOTE — HEALTH RISK ASSESSMENT
[Never] : Never [Yes] : Yes [2 - 3 times a week (3 pts)] : 2 - 3  times a week (3 points) [1 or 2 (0 pts)] : 1 or 2 (0 points) [Never (0 pts)] : Never (0 points) [No] : In the past 12 months have you used drugs other than those required for medical reasons? No [No falls in past year] : Patient reported no falls in the past year [0] : 2) Feeling down, depressed, or hopeless: Not at all (0) [No Retinopathy] : No retinopathy [Change in mental status noted] : Change in mental status noted [With Significant Other] : lives with significant other [Employed] : employed [College] : College [] :  [# Of Children ___] : has [unfilled] children [Fully functional (bathing, dressing, toileting, transferring, walking, feeding)] : Fully functional (bathing, dressing, toileting, transferring, walking, feeding) [Fully functional (using the telephone, shopping, preparing meals, housekeeping, doing laundry, using] : Fully functional and needs no help or supervision to perform IADLs (using the telephone, shopping, preparing meals, housekeeping, doing laundry, using transportation, managing medications and managing finances) [Smoke Detector] : smoke detector [Carbon Monoxide Detector] : carbon monoxide detector [Seat Belt] :  uses seat belt [Sunscreen] : uses sunscreen [Reviewed no changes] : Reviewed, no changes [Discussed at today's visit] : Advance Directives Discussed at today's visit [Designated Healthcare Proxy] : Designated healthcare proxy [Name: ___] : Health Care Proxy's Name: [unfilled]  [Good] : ~his/her~  mood as  good [PHQ-2 Negative - No further assessment needed] : PHQ-2 Negative - No further assessment needed [Audit-CScore] : 3 [de-identified] : exercising daily [de-identified] : good [[id number]] : 0 [EyeExamDate] : 07/20 [Reports changes in hearing] : Reports no changes in hearing [Reports changes in vision] : Reports no changes in vision [Reports changes in dental health] : Reports no changes in dental health [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

## 2022-10-24 NOTE — HISTORY OF PRESENT ILLNESS
[FreeTextEntry1] : 67-year-old male with history of hypertension for which he takes Avapro, hyperlipidemia previously on Crestor and gout on allopurinol present for his yearly physical.\par Patient also with history of type 2 diabetes which was diagnosed February 2016. He was on metformin which was discontinued secondary to side effects and currently is on Jardiance and gimepiride\par \par The patient had cardiac evaluation 2018 showing a significantly increased PVC burden with patient started on metoprolol.\par Workup included a cardiac catheterization showing severe stenosis of the right coronary artery with stenting and mild disease of the LAD and circumflex.\par He has done well since and continues on aspirin and Plavix. He had side effects from Brilinta.\par Stress test June 2022 negative\par \par the patient has significant hyperlipidemia with high cholesterol but also initially significant high triglycerides ALT 1300.\par Initially treated with atorvastatin with side effects.\par Change to brand cholesterol which he tolerated well with good benefit but insurance change to generic rosuvastatin with side effects. The patient and I discussed this about a month ago and he's been off of rosuvastatin for about one month and feels extremely well with no further side effects.\par He questions if he can go back on brand and Crestor\par \par The patient was diagnosed with hyperparathyroidism and had a parathyroidectomy March 2017.\par No surgical complications but the patient's calcium remains mildly elevated.\par He had a reexploration August 2018 with continued hypercalcemia and further testing recommended by  which the patient did not want to do.\par \par He currently is being followed by endocrinology for hyperparathyroidism and type 2 diabetes.\par \par Part of the preoperative evaluation showed the patient to have a left neck mass with biopsy showing it to be a Schwannoma\par This and his parathyroid were being followed by Dr. Aranda but the patient has not seen him in about 4 years.\par \par As a result of that biopsy the patient got significant headaches which have resolved but continues with paresthesias of his feet for which he was seen in neurology but was dissatisfied.\par The paresthesias have persisted.\par Secondary to persistent symptoms endocrinology started patient on gabapentin 100 mg at bedtime with improvement\par \par The patient has been compliant with his medications and feeling well without any symptoms including no chest pain, palpitations or shortness of breath. 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

## 2022-10-25 ENCOUNTER — NON-APPOINTMENT (OUTPATIENT)
Age: 67
End: 2022-10-25

## 2022-10-25 LAB
25(OH)D3 SERPL-MCNC: 16.9 NG/ML
ALBUMIN SERPL ELPH-MCNC: 4.4 G/DL
ALP BLD-CCNC: 101 U/L
ALT SERPL-CCNC: 18 U/L
ANION GAP SERPL CALC-SCNC: 12 MMOL/L
APPEARANCE: CLEAR
AST SERPL-CCNC: 16 U/L
BACTERIA: NEGATIVE
BASOPHILS # BLD AUTO: 0.05 K/UL
BASOPHILS NFR BLD AUTO: 1 %
BILIRUB SERPL-MCNC: 0.2 MG/DL
BILIRUBIN URINE: NEGATIVE
BLOOD URINE: NEGATIVE
BUN SERPL-MCNC: 22 MG/DL
CALCIUM SERPL-MCNC: 12 MG/DL
CALCIUM SERPL-MCNC: 12 MG/DL
CHLORIDE SERPL-SCNC: 102 MMOL/L
CHOLEST SERPL-MCNC: 382 MG/DL
CK SERPL-CCNC: 89 U/L
CO2 SERPL-SCNC: 21 MMOL/L
COLOR: NORMAL
CREAT SERPL-MCNC: 1.44 MG/DL
CREAT SPEC-SCNC: 90 MG/DL
EGFR: 53 ML/MIN/1.73M2
EOSINOPHIL # BLD AUTO: 0.29 K/UL
EOSINOPHIL NFR BLD AUTO: 5.9 %
GLUCOSE QUALITATIVE U: ABNORMAL
GLUCOSE SERPL-MCNC: 143 MG/DL
HCT VFR BLD CALC: 43.4 %
HDLC SERPL-MCNC: 25 MG/DL
HGB BLD-MCNC: 15.1 G/DL
HYALINE CASTS: 0 /LPF
IMM GRANULOCYTES NFR BLD AUTO: 0.2 %
KETONES URINE: NEGATIVE
LDLC SERPL CALC-MCNC: NORMAL MG/DL
LEUKOCYTE ESTERASE URINE: NEGATIVE
LYMPHOCYTES # BLD AUTO: 1.86 K/UL
LYMPHOCYTES NFR BLD AUTO: 37.7 %
MAGNESIUM SERPL-MCNC: 1.9 MG/DL
MAN DIFF?: NORMAL
MCHC RBC-ENTMCNC: 30.6 PG
MCHC RBC-ENTMCNC: 34.8 GM/DL
MCV RBC AUTO: 88 FL
MICROALBUMIN 24H UR DL<=1MG/L-MCNC: 1.4 MG/DL
MICROALBUMIN/CREAT 24H UR-RTO: 16 MG/G
MICROSCOPIC-UA: NORMAL
MONOCYTES # BLD AUTO: 0.45 K/UL
MONOCYTES NFR BLD AUTO: 9.1 %
NEUTROPHILS # BLD AUTO: 2.27 K/UL
NEUTROPHILS NFR BLD AUTO: 46.1 %
NITRITE URINE: NEGATIVE
NONHDLC SERPL-MCNC: 357 MG/DL
PARATHYROID HORMONE INTACT: 64 PG/ML
PH URINE: 5.5
PLATELET # BLD AUTO: 239 K/UL
POTASSIUM SERPL-SCNC: 4.8 MMOL/L
PROT SERPL-MCNC: 7 G/DL
PROTEIN URINE: NEGATIVE
PSA SERPL-MCNC: 1.44 NG/ML
RBC # BLD: 4.93 M/UL
RBC # FLD: 13.2 %
RED BLOOD CELLS URINE: 1 /HPF
SODIUM SERPL-SCNC: 135 MMOL/L
SPECIFIC GRAVITY URINE: 1.03
SQUAMOUS EPITHELIAL CELLS: 0 /HPF
TRIGL SERPL-MCNC: 1244 MG/DL
URATE SERPL-MCNC: 4.8 MG/DL
UROBILINOGEN URINE: NORMAL
WBC # FLD AUTO: 4.93 K/UL
WHITE BLOOD CELLS URINE: 1 /HPF

## 2022-10-26 ENCOUNTER — NON-APPOINTMENT (OUTPATIENT)
Age: 67
End: 2022-10-26

## 2022-10-26 LAB
ESTIMATED AVERAGE GLUCOSE: 151 MG/DL
HBA1C MFR BLD HPLC: 6.9 %

## 2022-11-02 ENCOUNTER — OUTPATIENT (OUTPATIENT)
Dept: OUTPATIENT SERVICES | Facility: HOSPITAL | Age: 67
LOS: 1 days | End: 2022-11-02
Payer: MEDICARE

## 2022-11-02 ENCOUNTER — APPOINTMENT (OUTPATIENT)
Dept: ULTRASOUND IMAGING | Facility: CLINIC | Age: 67
End: 2022-11-02

## 2022-11-02 DIAGNOSIS — Z98.89 OTHER SPECIFIED POSTPROCEDURAL STATES: Chronic | ICD-10-CM

## 2022-11-02 DIAGNOSIS — E89.2 POSTPROCEDURAL HYPOPARATHYROIDISM: Chronic | ICD-10-CM

## 2022-11-02 DIAGNOSIS — E83.52 HYPERCALCEMIA: ICD-10-CM

## 2022-11-02 PROCEDURE — 76775 US EXAM ABDO BACK WALL LIM: CPT | Mod: 26

## 2022-11-02 PROCEDURE — 76775 US EXAM ABDO BACK WALL LIM: CPT

## 2022-11-04 ENCOUNTER — APPOINTMENT (OUTPATIENT)
Dept: ENDOCRINOLOGY | Facility: CLINIC | Age: 67
End: 2022-11-04

## 2022-11-04 VITALS — OXYGEN SATURATION: 98 % | DIASTOLIC BLOOD PRESSURE: 86 MMHG | SYSTOLIC BLOOD PRESSURE: 124 MMHG | HEART RATE: 80 BPM

## 2022-11-04 VITALS — WEIGHT: 195 LBS | HEIGHT: 70 IN | BODY MASS INDEX: 27.92 KG/M2

## 2022-11-04 PROCEDURE — 99215 OFFICE O/P EST HI 40 MIN: CPT

## 2022-11-04 NOTE — PHYSICAL EXAM
[Alert] : alert [Healthy Appearance] : healthy appearance [No Acute Distress] : no acute distress [Well Developed] : well developed [Normal Sclera/Conjunctiva] : normal sclera/conjunctiva [EOMI] : extra ocular movement intact [No Neck Mass] : no neck mass was observed [Thyroid Not Enlarged] : the thyroid was not enlarged [No Thyroid Nodules] : no palpable thyroid nodules [No Respiratory Distress] : no respiratory distress [Clear to Auscultation] : lungs were clear to auscultation bilaterally [Normal S1, S2] : normal S1 and S2 [Normal Rate] : heart rate was normal [Regular Rhythm] : with a regular rhythm [No Edema] : no peripheral edema [Pedal Pulses Normal] : the pedal pulses are present [Not Tender] : non-tender [Not Distended] : not distended [Soft] : abdomen soft [Normal Supraclavicular Nodes] : no supraclavicular lymphadenopathy [Normal Anterior Cervical Nodes] : no anterior cervical lymphadenopathy [Normal Gait] : normal gait [No Rash] : no rash [No Skin Lesions] : no skin lesions [Normal] : normal [2+] : 2+ in the dorsalis pedis [No Tremors] : no tremors [Oriented x3] : oriented to person, place, and time [Normal Affect] : the affect was normal [Normal Insight/Judgement] : insight and judgment were intact [Normal Mood] : the mood was normal [Acanthosis Nigricans] : no acanthosis nigricans [Diminished Throughout Both Feet] : normal tactile sensation with monofilament testing throughout both feet [de-identified] : Right hand s/p carpal-tunel repair. Decreased strength.

## 2022-11-04 NOTE — ASSESSMENT
[FreeTextEntry1] : 66 y.o. male with PMH of T2D (Dx 2018), Hypercalcemia, PHPT, Gout presents for follow up for management of DM and Hypercalcemia. PHPT Dx few year ago (~2017) patient underwent  parathyroidectomy and consequent revision with Dr. Aranda but hypercalcemia did not resolve. Unclear how many glands were taken.\par Dr. Aranda offer him exploratory intervention to look for other locations, but patient declined. Ca+ ranging 10.9 - 12 with PTHi of mid 50s. \par  \par DM since 2018. Could not tolerate Metformin and was switched to Jardiance\par On last visit we added Glimepiride 2 mg daily\par \par Today he presents with his wife. Denies acute issues. Patient states his BG significantly improved after staring Glimepiride. \par \par Currently on:\par Jardiance 25 mg daily\par Glimeperide 2 mg daily\par Last A1C 7.9%. \par \par # DM\par A1C with improvement 6.9<== 7.9%\par Unable to chec SMBG due to very sensitive fingers\par Continue Jardiance 25 mg daily\par Continue Glimepiride 2 mg daily \par Continue physical activities with walks at least 30 min 3 x week\par \par # PHPT\par s/p remote parathyroidectomy, unsuccessful as per the patient. \par Most recent Ca+ 12.0 (snehal 11.68) with PTH 64\par Renal US 11/2/22 - negative for renal stones\par Did not do 24h urine Ca. Patient asked to collect 24h urine in 1 months. We will call him with results and possibly consider Sensipar.  \par Last DEXA 2014 - only osteopenia -1.7 in forearm. FRAX 10y risk for major opt fx is 6% and hip fx is 0.5%\par Patient has not repeat DEXA yet. He will do it in the next couple of weeks.\par Will call him to discuss bone scan results along with 24h urine and patient states he might consider re-evaluation with Dr. Aranda for surgical treatment\par Advised to continue keeping good hydration at least 6 to 8 glasses of water daily\par \par #HLD\par TGR significantly elevated 1244<== 467 after he stopped Atorvastatin due to muscle stiffness.\par Rosuvastatin (Crestor) 20 mg was started 1 week ago.\par I expressed my concern for potential risk of pancreatitis and offered him Fenofibrate, but patient declines and would like to see what the TGL level would be in 1 month after starting Crestor (worked well for him in the past) and then to decide for second lipid agent. Will follow labs in 1 month \par Advised low fat/cholesterol diet\par \par # HTN\par Controlled with Metoprolol 25 mg BID\par Advised low salt diet\par \par RTC in 4 months. \par

## 2022-11-04 NOTE — HISTORY OF PRESENT ILLNESS
[FreeTextEntry1] : 66 y.o. male with PMH of T2D (Dx 2018), Hypercalcemia, PHPT, Gout presents for follow up for management of DM and Hypercalcemia. PHPT Dx few year ago (~2017) patient underwent  parathyroidectomy and consequent revision with Dr. Aranda but hypercalcemia did not resolve. Unclear how many glands were taken.\par Dr. Aranda offer him exploratory intervention to look for other locations, but patient declined. Ca+ ranging 10.9 - 12 with PTHi of mid 50s.

## 2022-11-07 ENCOUNTER — RX RENEWAL (OUTPATIENT)
Age: 67
End: 2022-11-07

## 2023-01-03 ENCOUNTER — RX RENEWAL (OUTPATIENT)
Age: 68
End: 2023-01-03

## 2023-01-09 ENCOUNTER — APPOINTMENT (OUTPATIENT)
Dept: INTERNAL MEDICINE | Facility: CLINIC | Age: 68
End: 2023-01-09
Payer: MEDICARE

## 2023-01-09 VITALS
DIASTOLIC BLOOD PRESSURE: 80 MMHG | WEIGHT: 190 LBS | RESPIRATION RATE: 14 BRPM | BODY MASS INDEX: 27.26 KG/M2 | HEART RATE: 79 BPM | SYSTOLIC BLOOD PRESSURE: 132 MMHG

## 2023-01-09 PROCEDURE — 99213 OFFICE O/P EST LOW 20 MIN: CPT

## 2023-01-09 NOTE — ASSESSMENT
[FreeTextEntry1] : Patient prescribed Ceftin x10 days/Tessalon, viral swab sent.Patient advised to rest/increase fluids and use supportive therapy. Patient will call if symptoms persist or worsen and return to the office as scheduled for regular followup.\par \par \par Dr. Mcgrath was present in office building while I examined patient\par

## 2023-01-09 NOTE — PHYSICAL EXAM
[Normal Outer Ear/Nose] : the outer ears and nose were normal in appearance [Normal TMs] : both tympanic membranes were normal [Normal Nasal Mucosa] : the nasal mucosa was normal [Supple] : supple [de-identified] : +slight PND [de-identified] : + Slight apical wheezing, no rales or rhonchi

## 2023-01-10 ENCOUNTER — NON-APPOINTMENT (OUTPATIENT)
Age: 68
End: 2023-01-10

## 2023-01-10 LAB
INFLUENZA A RESULT: NOT DETECTED
INFLUENZA B RESULT: NOT DETECTED
RESP SYN VIRUS RESULT: NOT DETECTED
SARS-COV-2 RESULT: NOT DETECTED

## 2023-01-11 ENCOUNTER — RX RENEWAL (OUTPATIENT)
Age: 68
End: 2023-01-11

## 2023-01-25 RX ORDER — CEFUROXIME AXETIL 250 MG/1
250 TABLET ORAL
Qty: 20 | Refills: 0 | Status: DISCONTINUED | COMMUNITY
Start: 2023-01-09 | End: 2023-01-25

## 2023-01-25 RX ORDER — BENZONATATE 200 MG/1
200 CAPSULE ORAL 3 TIMES DAILY
Qty: 25 | Refills: 0 | Status: DISCONTINUED | COMMUNITY
Start: 2023-01-09 | End: 2023-01-25

## 2023-01-26 ENCOUNTER — APPOINTMENT (OUTPATIENT)
Dept: INTERNAL MEDICINE | Facility: CLINIC | Age: 68
End: 2023-01-26
Payer: MEDICARE

## 2023-01-26 VITALS
RESPIRATION RATE: 13 BRPM | HEIGHT: 70 IN | DIASTOLIC BLOOD PRESSURE: 80 MMHG | OXYGEN SATURATION: 97 % | HEART RATE: 92 BPM | WEIGHT: 187 LBS | SYSTOLIC BLOOD PRESSURE: 124 MMHG | BODY MASS INDEX: 26.77 KG/M2

## 2023-01-26 PROCEDURE — 99214 OFFICE O/P EST MOD 30 MIN: CPT | Mod: 25

## 2023-01-26 PROCEDURE — 36415 COLL VENOUS BLD VENIPUNCTURE: CPT

## 2023-01-26 RX ORDER — BLOOD-GLUCOSE METER
KIT MISCELLANEOUS
Qty: 1 | Refills: 0 | Status: DISCONTINUED | COMMUNITY
Start: 2018-01-29 | End: 2023-01-26

## 2023-01-26 RX ORDER — IBUPROFEN 800 MG/1
800 TABLET, FILM COATED ORAL
Qty: 30 | Refills: 0 | Status: DISCONTINUED | COMMUNITY
Start: 2022-05-31 | End: 2023-01-26

## 2023-01-27 ENCOUNTER — TRANSCRIPTION ENCOUNTER (OUTPATIENT)
Age: 68
End: 2023-01-27

## 2023-01-27 ENCOUNTER — NON-APPOINTMENT (OUTPATIENT)
Age: 68
End: 2023-01-27

## 2023-01-27 LAB
25(OH)D3 SERPL-MCNC: 75.2 NG/ML
ALBUMIN SERPL ELPH-MCNC: 4.7 G/DL
ALP BLD-CCNC: 96 U/L
ALT SERPL-CCNC: 31 U/L
ANION GAP SERPL CALC-SCNC: 13 MMOL/L
AST SERPL-CCNC: 20 U/L
BASOPHILS # BLD AUTO: 0.06 K/UL
BASOPHILS NFR BLD AUTO: 1 %
BILIRUB SERPL-MCNC: 0.3 MG/DL
BUN SERPL-MCNC: 39 MG/DL
CALCIUM SERPL-MCNC: 12.7 MG/DL
CALCIUM SERPL-MCNC: 12.7 MG/DL
CHLORIDE SERPL-SCNC: 100 MMOL/L
CHOLEST SERPL-MCNC: 174 MG/DL
CO2 SERPL-SCNC: 22 MMOL/L
CREAT SERPL-MCNC: 1.52 MG/DL
EGFR: 49 ML/MIN/1.73M2
EOSINOPHIL # BLD AUTO: 0.35 K/UL
EOSINOPHIL NFR BLD AUTO: 5.9 %
ESTIMATED AVERAGE GLUCOSE: 171 MG/DL
GLUCOSE SERPL-MCNC: 201 MG/DL
HBA1C MFR BLD HPLC: 7.6 %
HCT VFR BLD CALC: 44.6 %
HDLC SERPL-MCNC: 28 MG/DL
HGB BLD-MCNC: 15.1 G/DL
IMM GRANULOCYTES NFR BLD AUTO: 0.2 %
LDLC SERPL CALC-MCNC: NORMAL MG/DL
LYMPHOCYTES # BLD AUTO: 1.74 K/UL
LYMPHOCYTES NFR BLD AUTO: 29.1 %
MAGNESIUM SERPL-MCNC: 1.8 MG/DL
MAN DIFF?: NORMAL
MCHC RBC-ENTMCNC: 30.8 PG
MCHC RBC-ENTMCNC: 33.9 GM/DL
MCV RBC AUTO: 91 FL
MONOCYTES # BLD AUTO: 0.41 K/UL
MONOCYTES NFR BLD AUTO: 6.9 %
NEUTROPHILS # BLD AUTO: 3.41 K/UL
NEUTROPHILS NFR BLD AUTO: 56.9 %
NONHDLC SERPL-MCNC: 146 MG/DL
PARATHYROID HORMONE INTACT: 83 PG/ML
PLATELET # BLD AUTO: 214 K/UL
POTASSIUM SERPL-SCNC: 4.8 MMOL/L
PROT SERPL-MCNC: 7.2 G/DL
RBC # BLD: 4.9 M/UL
RBC # FLD: 12.8 %
SODIUM SERPL-SCNC: 135 MMOL/L
TRIGL SERPL-MCNC: 724 MG/DL
TSH SERPL-ACNC: 1.59 UIU/ML
URATE SERPL-MCNC: 5.1 MG/DL
WBC # FLD AUTO: 5.98 K/UL

## 2023-01-27 NOTE — ADDENDUM
[FreeTextEntry1] : Labs show\par -CA of 12.7=faxed to endocrine, await recs\par -BUN/Creat of 39/1.52, check in 3 weeks\par -trig high with low HDL ?compliance with rx

## 2023-01-27 NOTE — HISTORY OF PRESENT ILLNESS
[FreeTextEntry1] : Patient presents for followup on hypertension/hyperlipidemia/T2DM. Patient is currently fasting for today's labs. Patient is currently on Diovan/toprol for hypertension, on Crestor for hyperlipidemia and is On jardiance for type 2 diabetes\par -got covid vaccines X3\par -aches resolved on BRAND crestor vs generic\par -no new issues\par

## 2023-01-27 NOTE — ASSESSMENT
[FreeTextEntry1] : Venipuncture done in our office, Labs sent out/ further recommendations will be made based on lab results. Patient advised continue present medications with diet/exercise and specialist followup. Patient will return to the office in 3months\par \par last colonoscopy was 8/2017-next in 5 years= "to do"=referral given\par Last bone density was 1/2020="to do"=rx given\par specialists include\par 1. Orthopedic prn-Dr. Parekh/Dr. Macdonald\par 2. Dermatology-Dr. Terry\par 3. Ophthalmology-Dr. Alvarez =3/2022\par 4. Head and neck MD-Dr. Aranda=no longer goes\par 5.GI-\par 6.Cardio-Dr. Iyer\par 7.ENT- Dr. Solomon\par 8. Parathyroid specialist/endocrine-Dr. Rushing\par 9.Rheum for +NOEL=declines asymptomatic\par vaccines are UTD, +got covid vaccines X3\par MA 10/2022\par 24 hour urine=4/2022\par Echo via cardio\par

## 2023-02-15 ENCOUNTER — OUTPATIENT (OUTPATIENT)
Dept: OUTPATIENT SERVICES | Facility: HOSPITAL | Age: 68
LOS: 1 days | End: 2023-02-15
Payer: MEDICARE

## 2023-02-15 ENCOUNTER — APPOINTMENT (OUTPATIENT)
Dept: RADIOLOGY | Facility: CLINIC | Age: 68
End: 2023-02-15
Payer: MEDICARE

## 2023-02-15 DIAGNOSIS — E89.2 POSTPROCEDURAL HYPOPARATHYROIDISM: Chronic | ICD-10-CM

## 2023-02-15 DIAGNOSIS — M85.80 OTHER SPECIFIED DISORDERS OF BONE DENSITY AND STRUCTURE, UNSPECIFIED SITE: ICD-10-CM

## 2023-02-15 DIAGNOSIS — Z98.89 OTHER SPECIFIED POSTPROCEDURAL STATES: Chronic | ICD-10-CM

## 2023-02-15 PROCEDURE — 77080 DXA BONE DENSITY AXIAL: CPT

## 2023-02-15 PROCEDURE — 77080 DXA BONE DENSITY AXIAL: CPT | Mod: 26

## 2023-02-16 ENCOUNTER — TRANSCRIPTION ENCOUNTER (OUTPATIENT)
Age: 68
End: 2023-02-16

## 2023-02-16 ENCOUNTER — NON-APPOINTMENT (OUTPATIENT)
Age: 68
End: 2023-02-16

## 2023-02-17 ENCOUNTER — APPOINTMENT (OUTPATIENT)
Dept: INTERNAL MEDICINE | Facility: CLINIC | Age: 68
End: 2023-02-17
Payer: MEDICARE

## 2023-02-23 ENCOUNTER — APPOINTMENT (OUTPATIENT)
Dept: INTERNAL MEDICINE | Facility: CLINIC | Age: 68
End: 2023-02-23
Payer: MEDICARE

## 2023-02-23 VITALS
OXYGEN SATURATION: 97 % | SYSTOLIC BLOOD PRESSURE: 122 MMHG | WEIGHT: 191 LBS | RESPIRATION RATE: 13 BRPM | HEART RATE: 73 BPM | DIASTOLIC BLOOD PRESSURE: 80 MMHG | HEIGHT: 70 IN | BODY MASS INDEX: 27.35 KG/M2

## 2023-02-23 DIAGNOSIS — R79.89 OTHER SPECIFIED ABNORMAL FINDINGS OF BLOOD CHEMISTRY: ICD-10-CM

## 2023-02-23 LAB
ANION GAP SERPL CALC-SCNC: 11 MMOL/L
BUN SERPL-MCNC: 29 MG/DL
CALCIUM SERPL-MCNC: 12.6 MG/DL
CHLORIDE SERPL-SCNC: 100 MMOL/L
CO2 SERPL-SCNC: 22 MMOL/L
CREAT SERPL-MCNC: 1.38 MG/DL
EGFR: 55 ML/MIN/1.73M2
GLUCOSE SERPL-MCNC: 170 MG/DL
POTASSIUM SERPL-SCNC: 4.6 MMOL/L
SODIUM SERPL-SCNC: 133 MMOL/L

## 2023-02-23 PROCEDURE — 36415 COLL VENOUS BLD VENIPUNCTURE: CPT

## 2023-02-23 PROCEDURE — 99213 OFFICE O/P EST LOW 20 MIN: CPT | Mod: 25

## 2023-02-23 NOTE — ADDENDUM
[FreeTextEntry1] : Labs show\par -creatinine better at 1.38 but NACL decreased at 133=increase NACL to diet/do 24 hour urine\par -CA of 12.6, faxed to endo

## 2023-02-23 NOTE — HISTORY OF PRESENT ILLNESS
[FreeTextEntry1] : Patient presents for followup\par -Patient is currently on Diovan/toprol for hypertension\par -Last labs showed BUN/Creatinine of 39/1.52 with CA of 12.7 (following with endo), abnormal results d/w pt and questions answered\par -got covid vaccines X3\par \par

## 2023-02-23 NOTE — ASSESSMENT
[FreeTextEntry1] : Venipuncture done in our office, Labs sent out/ further recommendations will be made based on lab results. Patient advised continue present medications with diet/exercise and specialist followup. Patient will return to the office as sched for reg check\par \par last colonoscopy was 8/2017-next in 5 years= "to do"\par Last bone density was 2/2023=normal\par specialists include\par 1. Orthopedic prn-Dr. Parekh/Dr. Macdonald\par 2. Dermatology-Dr. Terry\par 3. Ophthalmology-Dr. Alvarez =3/2022\par 4. Head and neck MD-Dr. Aranda=no longer goes\par 5.GI-\par 6.Cardio-Dr. Iyer\par 7.ENT- Dr. Solomon\par 8. Parathyroid specialist/endocrine-Dr. Rushing\par 9.Rheum for +NOEL=declines asymptomatic\par vaccines are UTD, +got covid vaccines X3\par MA 10/2022\par 24 hour urine=4/2022= container given to repeat\par Echo via cardio\par

## 2023-02-24 ENCOUNTER — NON-APPOINTMENT (OUTPATIENT)
Age: 68
End: 2023-02-24

## 2023-03-02 ENCOUNTER — NON-APPOINTMENT (OUTPATIENT)
Age: 68
End: 2023-03-02

## 2023-03-03 RX ORDER — BLOOD SUGAR DIAGNOSTIC
STRIP MISCELLANEOUS DAILY
Qty: 200 | Refills: 1 | Status: DISCONTINUED | COMMUNITY
Start: 2018-01-29 | End: 2023-03-03

## 2023-03-06 ENCOUNTER — APPOINTMENT (OUTPATIENT)
Dept: ENDOCRINOLOGY | Facility: CLINIC | Age: 68
End: 2023-03-06

## 2023-03-08 ENCOUNTER — RX RENEWAL (OUTPATIENT)
Age: 68
End: 2023-03-08

## 2023-03-16 ENCOUNTER — APPOINTMENT (OUTPATIENT)
Dept: GASTROENTEROLOGY | Facility: CLINIC | Age: 68
End: 2023-03-16
Payer: MEDICARE

## 2023-03-16 ENCOUNTER — NON-APPOINTMENT (OUTPATIENT)
Age: 68
End: 2023-03-16

## 2023-03-16 VITALS
OXYGEN SATURATION: 100 % | HEIGHT: 70 IN | BODY MASS INDEX: 27.77 KG/M2 | TEMPERATURE: 97.9 F | DIASTOLIC BLOOD PRESSURE: 80 MMHG | SYSTOLIC BLOOD PRESSURE: 150 MMHG | WEIGHT: 194 LBS | RESPIRATION RATE: 15 BRPM | HEART RATE: 80 BPM

## 2023-03-16 DIAGNOSIS — G89.29 LOW BACK PAIN, UNSPECIFIED: ICD-10-CM

## 2023-03-16 DIAGNOSIS — Z78.9 OTHER SPECIFIED HEALTH STATUS: ICD-10-CM

## 2023-03-16 DIAGNOSIS — M71.21 SYNOVIAL CYST OF POPLITEAL SPACE [BAKER], RIGHT KNEE: ICD-10-CM

## 2023-03-16 DIAGNOSIS — M54.50 LOW BACK PAIN, UNSPECIFIED: ICD-10-CM

## 2023-03-16 DIAGNOSIS — I49.3 VENTRICULAR PREMATURE DEPOLARIZATION: ICD-10-CM

## 2023-03-16 DIAGNOSIS — K21.9 GASTRO-ESOPHAGEAL REFLUX DISEASE W/OUT ESOPHAGITIS: ICD-10-CM

## 2023-03-16 PROCEDURE — 99203 OFFICE O/P NEW LOW 30 MIN: CPT

## 2023-03-16 NOTE — HISTORY OF PRESENT ILLNESS
[FreeTextEntry1] : The patient was seen in 2017 after having undergone a screening colonoscopy in 2016 during which a 5 mm and 8 mm polyp were removed from the splenic flexure with snare polypectomy and both were tubular adenomas.  The prep was suboptimal.  He was also complaining of significant heartburn at that time.  In August 2017 he underwent a follow-up colonoscopy that was normal other than the presence of hemorrhoids and I recommended a follow-up exam in 5 years.  In June 2017 he underwent an upper endoscopy for evaluation of the heartburn that revealed a small amount of partially digested food still in the stomach but the exam was otherwise unremarkable.  Biopsies for H. pylori were negative.  Since that time he has since been taking pantoprazole as needed which is usually at a time that he anticipates eating food but is likely to result in heartburn.  He averages taking the pantoprazole 1-3 times weekly.  There is no dysphagia.  He is feeling well without any abdominal pain, nausea or vomiting.  There is no diarrhea, constipation, rectal bleeding or melena.  He underwent placement of a coronary stent more than 5 years ago and takes both clopidogrel as well as Jardiance.

## 2023-03-16 NOTE — ASSESSMENT
[FreeTextEntry1] : This time he is due for follow-up colonoscopy which will be scheduled.  Will require cardiac clearance.  He will stop the clopidogrel 5 days prior and the Jardiance 3 days prior. The risks, benefits, complications and possible adverse consequences associated with colonoscopy were discussed with the patient.\par

## 2023-03-16 NOTE — PHYSICAL EXAM
[Alert] : alert [Normal Voice/Communication] : normal voice/communication [Healthy Appearing] : healthy appearing [No Acute Distress] : no acute distress [Sclera] : the sclera and conjunctiva were normal [Hearing Threshold Finger Rub Not [place]] : hearing was normal [Normal Lips/Gums] : the lips and gums were normal [Normal Appearance] : the appearance of the neck was normal [Oropharynx] : the oropharynx was normal [No Neck Mass] : no neck mass was observed [No Respiratory Distress] : no respiratory distress [No Acc Muscle Use] : no accessory muscle use [Respiration, Rhythm And Depth] : normal respiratory rhythm and effort [Auscultation Breath Sounds / Voice Sounds] : lungs were clear to auscultation bilaterally [Heart Rate And Rhythm] : heart rate was normal and rhythm regular [Normal S1, S2] : normal S1 and S2 [Murmurs] : no murmurs [Bowel Sounds] : normal bowel sounds [Abdomen Tenderness] : non-tender [No Masses] : no abdominal mass palpated [Abdomen Soft] : soft [] : no hepatosplenomegaly [Abnormal Walk] : normal gait [Involuntary Movements] : no involuntary movements were seen [Motor Tone] : muscle strength and tone were normal [Normal Color / Pigmentation] : normal skin color and pigmentation [No Focal Deficits] : no focal deficits [Motor Exam] : the motor exam was normal [Oriented To Time, Place, And Person] : oriented to person, place, and time [Normal Mood] : the mood was normal [Normal Affect] : the affect was normal [de-identified] : There is umbilical hernia present

## 2023-03-23 ENCOUNTER — APPOINTMENT (OUTPATIENT)
Dept: INTERNAL MEDICINE | Facility: CLINIC | Age: 68
End: 2023-03-23
Payer: MEDICARE

## 2023-03-23 VITALS
HEIGHT: 70 IN | DIASTOLIC BLOOD PRESSURE: 76 MMHG | BODY MASS INDEX: 27.2 KG/M2 | SYSTOLIC BLOOD PRESSURE: 130 MMHG | RESPIRATION RATE: 12 BRPM | WEIGHT: 190 LBS | HEART RATE: 88 BPM | OXYGEN SATURATION: 97 % | TEMPERATURE: 96.8 F

## 2023-03-23 DIAGNOSIS — R05.9 COUGH, UNSPECIFIED: ICD-10-CM

## 2023-03-23 PROCEDURE — 99213 OFFICE O/P EST LOW 20 MIN: CPT

## 2023-03-24 ENCOUNTER — NON-APPOINTMENT (OUTPATIENT)
Age: 68
End: 2023-03-24

## 2023-03-24 NOTE — PHYSICAL EXAM
[No Acute Distress] : no acute distress [No Respiratory Distress] : no respiratory distress  [Clear to Auscultation] : lungs were clear to auscultation bilaterally [Normal Rate] : normal rate  [Regular Rhythm] : with a regular rhythm [Normal Affect] : the affect was normal [Normal Mood] : the mood was normal [Normal Outer Ear/Nose] : the outer ears and nose were normal in appearance [Normal TMs] : both tympanic membranes were normal [Normal Nasal Mucosa] : the nasal mucosa was normal [Supple] : supple [de-identified] : +PND [de-identified] : +dry cough noted

## 2023-03-24 NOTE — ASSESSMENT
[FreeTextEntry1] : Patient prescribed Ceftin x10 days/Tessalon, viral swab sent..Patient advised to rest/increase fluids and use supportive therapy. Patient will call if symptoms persist or worsen and return to the office as scheduled for regular followup.\par \par \par \par \par Dr. Mcgrath was present in office building while I examined patient\par

## 2023-03-24 NOTE — HISTORY OF PRESENT ILLNESS
[FreeTextEntry8] : Patient presents complaining of cough since Sunday.  Patient states symptoms are about the same, he is concerned as he is traveling next week.  Patient feels it in his throat and his chest.  Patient did not take COVID test, no sick contacts or COVID exposure.  Patient denies dyspnea/fever

## 2023-04-18 ENCOUNTER — APPOINTMENT (OUTPATIENT)
Dept: SURGERY | Facility: CLINIC | Age: 68
End: 2023-04-18
Payer: MEDICARE

## 2023-04-18 VITALS
HEIGHT: 70 IN | SYSTOLIC BLOOD PRESSURE: 177 MMHG | WEIGHT: 190 LBS | HEART RATE: 83 BPM | BODY MASS INDEX: 27.2 KG/M2 | DIASTOLIC BLOOD PRESSURE: 96 MMHG

## 2023-04-18 VITALS — HEART RATE: 83 BPM | SYSTOLIC BLOOD PRESSURE: 163 MMHG | DIASTOLIC BLOOD PRESSURE: 85 MMHG

## 2023-04-18 VITALS — HEART RATE: 87 BPM | DIASTOLIC BLOOD PRESSURE: 89 MMHG | SYSTOLIC BLOOD PRESSURE: 158 MMHG

## 2023-04-18 PROCEDURE — 36415 COLL VENOUS BLD VENIPUNCTURE: CPT

## 2023-04-18 PROCEDURE — 99204 OFFICE O/P NEW MOD 45 MIN: CPT | Mod: 25

## 2023-04-18 RX ORDER — CEFUROXIME AXETIL 250 MG/1
250 TABLET ORAL
Qty: 20 | Refills: 0 | Status: COMPLETED | COMMUNITY
Start: 2023-03-23 | End: 2023-04-18

## 2023-04-18 RX ORDER — BENZONATATE 200 MG/1
200 CAPSULE ORAL 3 TIMES DAILY
Qty: 21 | Refills: 0 | Status: COMPLETED | COMMUNITY
Start: 2023-03-23 | End: 2023-04-18

## 2023-04-18 NOTE — PHYSICAL EXAM
[de-identified] : well healed scar [de-identified] : no palpable thyroid nodules.  [Laryngoscopy Performed] : laryngoscopy was performed, see procedure section for findings [Midline] : located in midline position [Normal] : orientation to person, place, and time: normal [de-identified] : indirect  laryngoscopy shows normal vocal cord mobility bilaterally with no lesions noted

## 2023-04-18 NOTE — HISTORY OF PRESENT ILLNESS
[de-identified] : Pt s/p failed parathyroid exploration  with removal 1 parathyroid and thymus 2017,  with elevated calcium and bone pain and fatigue.  denies constipation or kidney stones, dysphagia, hoarseness, SOB . Pt was on Sensipar but unable to tolerate it.  also with left neck schwannoma.  f/u CT did not reveal abnormal parathyroid\par Ca 12.6,  PTH 83,  Vitamin D 75.2\par bone density: normal\par I have reviewed all old and new data and available images. Additional information was obtained from others present at the time of visit to ensure the completeness of the history

## 2023-04-18 NOTE — REASON FOR VISIT
[Initial Consultation] : an initial consultation for [FreeTextEntry2] : Hypercalcemia [Spouse] : spouse

## 2023-04-18 NOTE — ASSESSMENT
[FreeTextEntry1] : bloods drawn. requested MRI and sonogram to call next week for results. patient has been given the opportunity to ask questions, and all of the patient's questions have been answered to their satisfaction\par

## 2023-04-18 NOTE — CONSULT LETTER
[Dear  ___] : Dear  [unfilled], [Consult Letter:] : I had the pleasure of evaluating your patient, [unfilled]. [Please see my note below.] : Please see my note below. [Consult Closing:] : Thank you very much for allowing me to participate in the care of this patient.  If you have any questions, please do not hesitate to contact me. [Sincerely,] : Sincerely, [FreeTextEntry2] : Dr. Singh Mcgrath, Dr. Zully Schaffer [FreeTextEntry3] : Grant Aranda MD, FACS\par System Director, Endocrine Surgery\par [facility]\par Associate  Professor of Surgery\par [facility] [employer] [facility]  [DrCelestino  ___] : Dr. BOWEN

## 2023-04-19 LAB
25(OH)D3 SERPL-MCNC: 41.9 NG/ML
CALCIUM SERPL-MCNC: 12 MG/DL
CALCIUM SERPL-MCNC: 12 MG/DL
PARATHYROID HORMONE INTACT: 62 PG/ML
T3 SERPL-MCNC: 93 NG/DL
T4 FREE SERPL-MCNC: 1.2 NG/DL
TSH SERPL-ACNC: 1.58 UIU/ML

## 2023-04-20 LAB
THYROGLOB AB SERPL-ACNC: <20 IU/ML
THYROPEROXIDASE AB SERPL IA-ACNC: 21.3 IU/ML

## 2023-04-24 ENCOUNTER — APPOINTMENT (OUTPATIENT)
Dept: INTERNAL MEDICINE | Facility: CLINIC | Age: 68
End: 2023-04-24

## 2023-05-04 ENCOUNTER — RX RENEWAL (OUTPATIENT)
Age: 68
End: 2023-05-04

## 2023-05-09 ENCOUNTER — TRANSCRIPTION ENCOUNTER (OUTPATIENT)
Age: 68
End: 2023-05-09

## 2023-05-09 ENCOUNTER — NON-APPOINTMENT (OUTPATIENT)
Age: 68
End: 2023-05-09

## 2023-05-10 ENCOUNTER — APPOINTMENT (OUTPATIENT)
Dept: GASTROENTEROLOGY | Facility: GI CENTER | Age: 68
End: 2023-05-10
Payer: MEDICARE

## 2023-05-10 ENCOUNTER — OUTPATIENT (OUTPATIENT)
Dept: OUTPATIENT SERVICES | Facility: HOSPITAL | Age: 68
LOS: 1 days | End: 2023-05-10
Payer: MEDICARE

## 2023-05-10 DIAGNOSIS — E89.2 POSTPROCEDURAL HYPOPARATHYROIDISM: Chronic | ICD-10-CM

## 2023-05-10 DIAGNOSIS — Z12.11 ENCOUNTER FOR SCREENING FOR MALIGNANT NEOPLASM OF COLON: ICD-10-CM

## 2023-05-10 DIAGNOSIS — Z86.010 PERSONAL HISTORY OF COLONIC POLYPS: ICD-10-CM

## 2023-05-10 DIAGNOSIS — Z98.89 OTHER SPECIFIED POSTPROCEDURAL STATES: Chronic | ICD-10-CM

## 2023-05-10 LAB — GLUCOSE BLDC GLUCOMTR-MCNC: 191 MG/DL — HIGH (ref 70–99)

## 2023-05-10 PROCEDURE — 82962 GLUCOSE BLOOD TEST: CPT

## 2023-05-10 PROCEDURE — G0105: CPT

## 2023-05-10 PROCEDURE — 45378 DIAGNOSTIC COLONOSCOPY: CPT | Mod: PT

## 2023-05-10 NOTE — PHYSICAL EXAM
[Alert] : alert [Normal Voice/Communication] : normal voice/communication [Healthy Appearing] : healthy appearing [No Acute Distress] : no acute distress [Sclera] : the sclera and conjunctiva were normal [Normal Lips/Gums] : the lips and gums were normal [Hearing Threshold Finger Rub Not [place]] : hearing was normal [Oropharynx] : the oropharynx was normal [Normal Appearance] : the appearance of the neck was normal [No Neck Mass] : no neck mass was observed [No Respiratory Distress] : no respiratory distress [No Acc Muscle Use] : no accessory muscle use [Respiration, Rhythm And Depth] : normal respiratory rhythm and effort [Auscultation Breath Sounds / Voice Sounds] : lungs were clear to auscultation bilaterally [Heart Rate And Rhythm] : heart rate was normal and rhythm regular [Normal S1, S2] : normal S1 and S2 [Murmurs] : no murmurs [Bowel Sounds] : normal bowel sounds [No Masses] : no abdominal mass palpated [Abdomen Tenderness] : non-tender [Abdomen Soft] : soft [] : no hepatosplenomegaly [Abnormal Walk] : normal gait [Motor Tone] : muscle strength and tone were normal [Involuntary Movements] : no involuntary movements were seen [Normal Color / Pigmentation] : normal skin color and pigmentation [No Focal Deficits] : no focal deficits [Motor Exam] : the motor exam was normal [Normal Affect] : the affect was normal [Oriented To Time, Place, And Person] : oriented to person, place, and time [Normal Mood] : the mood was normal [de-identified] : There is umbilical hernia present

## 2023-06-11 ENCOUNTER — APPOINTMENT (OUTPATIENT)
Dept: ULTRASOUND IMAGING | Facility: CLINIC | Age: 68
End: 2023-06-11
Payer: MEDICARE

## 2023-06-11 ENCOUNTER — OUTPATIENT (OUTPATIENT)
Dept: OUTPATIENT SERVICES | Facility: HOSPITAL | Age: 68
LOS: 1 days | End: 2023-06-11
Payer: MEDICARE

## 2023-06-11 ENCOUNTER — APPOINTMENT (OUTPATIENT)
Dept: MRI IMAGING | Facility: CLINIC | Age: 68
End: 2023-06-11
Payer: MEDICARE

## 2023-06-11 DIAGNOSIS — Z98.89 OTHER SPECIFIED POSTPROCEDURAL STATES: Chronic | ICD-10-CM

## 2023-06-11 DIAGNOSIS — E89.2 POSTPROCEDURAL HYPOPARATHYROIDISM: Chronic | ICD-10-CM

## 2023-06-11 DIAGNOSIS — E21.3 HYPERPARATHYROIDISM, UNSPECIFIED: ICD-10-CM

## 2023-06-11 PROCEDURE — 70543 MRI ORBT/FAC/NCK W/O &W/DYE: CPT | Mod: 26,MH

## 2023-06-11 PROCEDURE — 76536 US EXAM OF HEAD AND NECK: CPT | Mod: 26

## 2023-06-11 PROCEDURE — 70543 MRI ORBT/FAC/NCK W/O &W/DYE: CPT

## 2023-06-11 PROCEDURE — A9585: CPT

## 2023-06-11 PROCEDURE — 76536 US EXAM OF HEAD AND NECK: CPT

## 2023-06-12 ENCOUNTER — APPOINTMENT (OUTPATIENT)
Dept: ENDOCRINOLOGY | Facility: CLINIC | Age: 68
End: 2023-06-12

## 2023-06-16 ENCOUNTER — NON-APPOINTMENT (OUTPATIENT)
Age: 68
End: 2023-06-16

## 2023-07-15 ENCOUNTER — RX RENEWAL (OUTPATIENT)
Age: 68
End: 2023-07-15

## 2023-07-31 ENCOUNTER — RX RENEWAL (OUTPATIENT)
Age: 68
End: 2023-07-31

## 2023-08-03 ENCOUNTER — RX RENEWAL (OUTPATIENT)
Age: 68
End: 2023-08-03

## 2023-09-13 ENCOUNTER — RX RENEWAL (OUTPATIENT)
Age: 68
End: 2023-09-13

## 2023-09-19 ENCOUNTER — APPOINTMENT (OUTPATIENT)
Dept: INTERNAL MEDICINE | Facility: CLINIC | Age: 68
End: 2023-09-19
Payer: MEDICARE

## 2023-09-19 VITALS
HEIGHT: 70 IN | BODY MASS INDEX: 26.63 KG/M2 | WEIGHT: 186 LBS | DIASTOLIC BLOOD PRESSURE: 80 MMHG | RESPIRATION RATE: 13 BRPM | HEART RATE: 80 BPM | SYSTOLIC BLOOD PRESSURE: 134 MMHG | OXYGEN SATURATION: 98 %

## 2023-09-19 PROCEDURE — 99214 OFFICE O/P EST MOD 30 MIN: CPT | Mod: 25

## 2023-09-19 PROCEDURE — 36415 COLL VENOUS BLD VENIPUNCTURE: CPT

## 2023-09-19 RX ORDER — SODIUM PICOSULFATE, MAGNESIUM OXIDE, AND ANHYDROUS CITRIC ACID 10; 3.5; 12 MG/160ML; G/160ML; G/160ML
10-3.5-12 MG-GM LIQUID ORAL
Qty: 2 | Refills: 0 | Status: DISCONTINUED | COMMUNITY
Start: 2023-03-16 | End: 2023-09-19

## 2023-09-19 RX ORDER — POLYETHYLENE GLYCOL-3350 AND ELECTROLYTES WITH FLAVOR PACK 240; 5.84; 2.98; 6.72; 22.72 G/278.26G; G/278.26G; G/278.26G; G/278.26G; G/278.26G
240 POWDER, FOR SOLUTION ORAL
Qty: 1 | Refills: 0 | Status: DISCONTINUED | COMMUNITY
Start: 2023-05-09 | End: 2023-09-19

## 2023-09-19 RX ORDER — CINACALCET 30 MG/1
30 TABLET ORAL TWICE DAILY
Qty: 60 | Refills: 3 | Status: DISCONTINUED | COMMUNITY
Start: 2023-06-16 | End: 2023-09-19

## 2023-09-20 LAB
25(OH)D3 SERPL-MCNC: 27.6 NG/ML
ALBUMIN SERPL ELPH-MCNC: 4.8 G/DL
ALP BLD-CCNC: 85 U/L
ALT SERPL-CCNC: 30 U/L
ANION GAP SERPL CALC-SCNC: 14 MMOL/L
AST SERPL-CCNC: 19 U/L
BASOPHILS # BLD AUTO: 0.05 K/UL
BASOPHILS NFR BLD AUTO: 0.9 %
BILIRUB SERPL-MCNC: 0.3 MG/DL
BUN SERPL-MCNC: 22 MG/DL
CALCIUM SERPL-MCNC: 11.9 MG/DL
CALCIUM SERPL-MCNC: 12.1 MG/DL
CHLORIDE SERPL-SCNC: 101 MMOL/L
CHOLEST SERPL-MCNC: 156 MG/DL
CO2 SERPL-SCNC: 22 MMOL/L
CREAT SERPL-MCNC: 1.4 MG/DL
EGFR: 54 ML/MIN/1.73M2
EOSINOPHIL # BLD AUTO: 0.29 K/UL
EOSINOPHIL NFR BLD AUTO: 5.2 %
ESTIMATED AVERAGE GLUCOSE: 186 MG/DL
GLUCOSE SERPL-MCNC: 157 MG/DL
HBA1C MFR BLD HPLC: 8.1 %
HCT VFR BLD CALC: 44 %
HDLC SERPL-MCNC: 30 MG/DL
HGB BLD-MCNC: 15 G/DL
IMM GRANULOCYTES NFR BLD AUTO: 0.2 %
LDLC SERPL CALC-MCNC: 48 MG/DL
LYMPHOCYTES # BLD AUTO: 1.68 K/UL
LYMPHOCYTES NFR BLD AUTO: 30 %
MAGNESIUM SERPL-MCNC: 1.8 MG/DL
MAN DIFF?: NORMAL
MCHC RBC-ENTMCNC: 30.7 PG
MCHC RBC-ENTMCNC: 34.1 GM/DL
MCV RBC AUTO: 90.2 FL
MONOCYTES # BLD AUTO: 0.49 K/UL
MONOCYTES NFR BLD AUTO: 8.8 %
NEUTROPHILS # BLD AUTO: 3.08 K/UL
NEUTROPHILS NFR BLD AUTO: 54.9 %
NONHDLC SERPL-MCNC: 127 MG/DL
PARATHYROID HORMONE INTACT: 59 PG/ML
PLATELET # BLD AUTO: 203 K/UL
POTASSIUM SERPL-SCNC: 4.6 MMOL/L
PROT SERPL-MCNC: 7.2 G/DL
RBC # BLD: 4.88 M/UL
RBC # FLD: 13.2 %
SODIUM SERPL-SCNC: 137 MMOL/L
TRIGL SERPL-MCNC: 540 MG/DL
TSH SERPL-ACNC: 0.84 UIU/ML
URATE SERPL-MCNC: 5.2 MG/DL
WBC # FLD AUTO: 5.6 K/UL

## 2023-10-02 ENCOUNTER — RX RENEWAL (OUTPATIENT)
Age: 68
End: 2023-10-02

## 2023-10-05 RX ORDER — MULTIVIT-MIN/FOLIC/VIT K/LYCOP 400-300MCG
50 MCG TABLET ORAL
Qty: 90 | Refills: 0 | Status: ACTIVE | COMMUNITY
Start: 2022-02-18

## 2023-10-10 ENCOUNTER — LABORATORY RESULT (OUTPATIENT)
Age: 68
End: 2023-10-10

## 2023-10-10 ENCOUNTER — APPOINTMENT (OUTPATIENT)
Dept: SURGERY | Facility: CLINIC | Age: 68
End: 2023-10-10
Payer: MEDICARE

## 2023-10-10 LAB
25(OH)D3 SERPL-MCNC: 25.9 NG/ML
CALCIUM SERPL-MCNC: 11.5 MG/DL
CALCIUM SERPL-MCNC: 11.5 MG/DL
PARATHYROID HORMONE INTACT: 70 PG/ML
T3 SERPL-MCNC: 73 NG/DL
T4 FREE SERPL-MCNC: 1.2 NG/DL
TSH SERPL-ACNC: 1.44 UIU/ML

## 2023-10-10 PROCEDURE — 36415 COLL VENOUS BLD VENIPUNCTURE: CPT

## 2023-10-10 PROCEDURE — 99214 OFFICE O/P EST MOD 30 MIN: CPT | Mod: 25

## 2023-11-13 ENCOUNTER — RX RENEWAL (OUTPATIENT)
Age: 68
End: 2023-11-13

## 2023-11-15 ENCOUNTER — NON-APPOINTMENT (OUTPATIENT)
Age: 68
End: 2023-11-15

## 2023-12-20 RX ORDER — GABAPENTIN 300 MG/1
300 CAPSULE ORAL
Qty: 90 | Refills: 1 | Status: ACTIVE | COMMUNITY
Start: 2019-03-27

## 2024-01-10 ENCOUNTER — APPOINTMENT (OUTPATIENT)
Dept: INTERNAL MEDICINE | Facility: CLINIC | Age: 69
End: 2024-01-10
Payer: MEDICARE

## 2024-01-10 VITALS
OXYGEN SATURATION: 98 % | BODY MASS INDEX: 26.92 KG/M2 | RESPIRATION RATE: 14 BRPM | HEART RATE: 76 BPM | SYSTOLIC BLOOD PRESSURE: 146 MMHG | DIASTOLIC BLOOD PRESSURE: 78 MMHG | WEIGHT: 188 LBS | HEIGHT: 70 IN

## 2024-01-10 DIAGNOSIS — I25.10 ATHEROSCLEROTIC HEART DISEASE OF NATIVE CORONARY ARTERY W/OUT ANGINA PECTORIS: ICD-10-CM

## 2024-01-10 DIAGNOSIS — M85.80 OTHER SPECIFIED DISORDERS OF BONE DENSITY AND STRUCTURE, UNSPECIFIED SITE: ICD-10-CM

## 2024-01-10 DIAGNOSIS — Z00.00 ENCOUNTER FOR GENERAL ADULT MEDICAL EXAMINATION W/OUT ABNORMAL FINDINGS: ICD-10-CM

## 2024-01-10 DIAGNOSIS — E83.52 HYPERCALCEMIA: ICD-10-CM

## 2024-01-10 DIAGNOSIS — Z98.61 ATHEROSCLEROTIC HEART DISEASE OF NATIVE CORONARY ARTERY W/OUT ANGINA PECTORIS: ICD-10-CM

## 2024-01-10 PROCEDURE — 90662 IIV NO PRSV INCREASED AG IM: CPT

## 2024-01-10 PROCEDURE — G0439: CPT

## 2024-01-10 PROCEDURE — 36415 COLL VENOUS BLD VENIPUNCTURE: CPT

## 2024-01-10 PROCEDURE — G0008: CPT

## 2024-01-10 NOTE — COUNSELING
[Good understanding] : Patient has a good understanding of disease, goals and obesity follow-up plan [None] : None [de-identified] : Continue diet and exercise

## 2024-01-10 NOTE — HISTORY OF PRESENT ILLNESS
[FreeTextEntry1] : 68-year-old male with history of hypertension for which he takes valsartan, hyperlipidemia previously on Crestor and fish oil and gout on allopurinol present for his yearly physical. Patient also with history of type 2 diabetes which was diagnosed February 2016. He was on metformin which was discontinued secondary to side effects and currently is on Jardiance and gimepiride  The patient had cardiac evaluation 2018 showing a significantly increased PVC burden with patient started on metoprolol. Workup included a cardiac catheterization showing severe stenosis of the right coronary artery with stenting and mild disease of the LAD and circumflex. He has done well since and continues on aspirin and Plavix. He had side effects from Brilinta. Stress test June 2022 negative.  the patient has significant hyperlipidemia with high cholesterol but also initially significant high triglycerides ALT 1300. Initially treated with atorvastatin with side effects. Change to brand cholesterol which he tolerated well with good benefit but insurance change to generic rosuvastatin with side effects.  Therefore on brand Crestor  The patient was diagnosed with hyperparathyroidism and had a parathyroidectomy March 2017. No surgical complications but the patient's calcium remains mildly elevated. He had a reexploration August 2018 with continued hypercalcemia. He has followed up with head and neck surgery Dr. Aranda and secondary to persistently increased calcium further surgery recommended which the patient adamantly does not want to do.  He currently is being followed by endocrinology for hyperparathyroidism and type 2 diabetes.  Part of the preoperative evaluation showed the patient to have a left neck mass with biopsy showing it to be a Schwannoma This and his parathyroid were being followed by Dr. Aranda who he followed up with October 2023  As a result of that biopsy the patient got significant headaches which have resolved but continues with paresthesias of his feet for which he was seen in neurology but was dissatisfied. The paresthesias have persisted. Secondary to persistent symptoms endocrinology started patient on gabapentin 300 mg at bedtime with improvement  The patient has been compliant with his medications and feeling well without any symptoms including no chest pain, palpitations or shortness of breath.  His gout has been quiet on allopurinol and he has not used any colchicine for any gouty attacks.  He had right greater than left hand pins and needles mainly occurring at night without any functional issues. he is status post right carpal tunnel syndrome repair April 2022 with success.  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

## 2024-01-10 NOTE — HEALTH RISK ASSESSMENT
[Good] : ~his/her~  mood as  good [Yes] : Yes [2 - 3 times a week (3 pts)] : 2 - 3  times a week (3 points) [1 or 2 (0 pts)] : 1 or 2 (0 points) [Never (0 pts)] : Never (0 points) [No] : In the past 12 months have you used drugs other than those required for medical reasons? No [No falls in past year] : Patient reported no falls in the past year [0] : 2) Feeling down, depressed, or hopeless: Not at all (0) [PHQ-2 Negative - No further assessment needed] : PHQ-2 Negative - No further assessment needed [No Retinopathy] : No retinopathy [Change in mental status noted] : Change in mental status noted [With Significant Other] : lives with significant other [Employed] : employed [College] : College [] :  [# Of Children ___] : has [unfilled] children [Fully functional (bathing, dressing, toileting, transferring, walking, feeding)] : Fully functional (bathing, dressing, toileting, transferring, walking, feeding) [Fully functional (using the telephone, shopping, preparing meals, housekeeping, doing laundry, using] : Fully functional and needs no help or supervision to perform IADLs (using the telephone, shopping, preparing meals, housekeeping, doing laundry, using transportation, managing medications and managing finances) [Smoke Detector] : smoke detector [Carbon Monoxide Detector] : carbon monoxide detector [Seat Belt] :  uses seat belt [Sunscreen] : uses sunscreen [Reviewed no changes] : Reviewed, no changes [Never] : Never [Discussed at today's visit] : Advance Directives Discussed at today's visit [Designated Healthcare Proxy] : Designated healthcare proxy [Name: ___] : Health Care Proxy's Name: [unfilled]  [Audit-CScore] : 3 [de-identified] : exercising daily [de-identified] : good [[id number]] : 0 [EyeExamDate] : 07/20 [Reports changes in hearing] : Reports no changes in hearing [Reports changes in vision] : Reports no changes in vision [Reports changes in dental health] : Reports no changes in dental health [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

## 2024-01-10 NOTE — ASSESSMENT
[FreeTextEntry1] : 68-year-old male diagnosed with hypertension PVCs 2018 with improvement on beta blocker. Also significant RCA stenosis with stenting and mild disease of the LAD and circumflex. Cardiologically stable.  Follow-up with cardiology as scheduled  Hyperlipidemia with significant hypertriglyceridemia with initial triglycerides about 1300. Definitely improved with rosuvastatin with significant side effects which have resolved since he stopped it and now on brand Crestor with tolerance and benefit.  History of parathyroid surgery with persistent increase in calcium with potential other parathyroid nodules with head and neck surgery recommending surgery with the patient adamantly does not want to do.  Patient told he needs to follow-up with endocrinology which she agrees to do.  controlled hypertension on valsartan  His gout is quiet with keeping his uric acid down with allopurinol and has had no gouty attacks.  Patient with diagnosed type 2 diabetes 2017 on Jardiance and glimepiride. followed by endocrinology who is also following patient with history of hyperparathyroidism  Patient also with neck schwannoma which is also being followed by Dr Gutierrez but the patient had not seen him though had imaging done in about 4 years therefore referral given and told he needs to followup here  Patient encouraged to improve his diet with exercise which may help his energy level.  Therefore he is to continue his present medications  High-dose influenza vaccine given left deltoid All vaccines up to date including COVID  Colonoscopy September 2016 with followup in 10 years Ophthalmology yearly. Referral given Bone density January 2020  Venipuncture done today in the office  Followup 3 months.

## 2024-01-10 NOTE — PHYSICAL EXAM
[General Appearance - Alert] : alert [General Appearance - In No Acute Distress] : in no acute distress [Sclera] : the sclera and conjunctiva were normal [PERRL With Normal Accommodation] : pupils were equal in size, round, and reactive to light [Extraocular Movements] : extraocular movements were intact [Outer Ear] : the ears and nose were normal in appearance [Oropharynx] : the oropharynx was normal [Neck Appearance] : the appearance of the neck was normal [Neck Cervical Mass (___cm)] : no neck mass was observed [Jugular Venous Distention Increased] : there was no jugular-venous distention [Thyroid Diffuse Enlargement] : the thyroid was not enlarged [Thyroid Nodule] : there were no palpable thyroid nodules [Auscultation Breath Sounds / Voice Sounds] : lungs were clear to auscultation bilaterally [Heart Rate And Rhythm] : heart rate was normal and rhythm regular [Heart Sounds] : normal S1 and S2 [Heart Sounds Gallop] : no gallops [Murmurs] : no murmurs [Heart Sounds Pericardial Friction Rub] : no pericardial rub [Arterial Pulses Carotid] : carotid pulses were normal with no bruits [Arterial Pulses Femoral] : femoral pulses were normal without bruits [Full Pulse] : the pedal pulses are present [Edema] : there was no peripheral edema [Veins - Varicosity Changes] : there were no varicosital changes [Bowel Sounds] : normal bowel sounds [Abdomen Soft] : soft [Abdomen Tenderness] : non-tender [Abdomen Mass (___ Cm)] : no abdominal mass palpated [Normal Sphincter Tone] : normal sphincter tone [No Rectal Mass] : no rectal mass [Prostate Enlargement] : the prostate was not enlarged [Prostate Tenderness] : the prostate was not tender [Cervical Lymph Nodes Enlarged Posterior Bilaterally] : posterior cervical [Cervical Lymph Nodes Enlarged Anterior Bilaterally] : anterior cervical [Supraclavicular Lymph Nodes Enlarged Bilaterally] : supraclavicular [Axillary Lymph Nodes Enlarged Bilaterally] : axillary [Femoral Lymph Nodes Enlarged Bilaterally] : femoral [Inguinal Lymph Nodes Enlarged Bilaterally] : inguinal [No Spinal Tenderness] : no spinal tenderness [Abnormal Walk] : normal gait [Nail Clubbing] : no clubbing  or cyanosis of the fingernails [Musculoskeletal - Swelling] : no joint swelling seen [Motor Tone] : muscle strength and tone were normal [Skin Color & Pigmentation] : normal skin color and pigmentation [Skin Turgor] : normal skin turgor [] : no rash [Right Foot Was Examined] : right foot was examined [Left Foot Was Examined] : left foot was examined [Normal Appearance] : normal in appearance [Normal in Appearance] : normal in appearance [Normal] : normal [2+] : 2+ in the dorsalis pedis [No Focal Deficits] : no focal deficits [Oriented To Time, Place, And Person] : oriented to person, place, and time [Impaired Insight] : insight and judgment were intact [Affect] : the affect was normal [FreeTextEntry1] : small umbilical hernia [#1 Diminished] : number 1 was normal [#2 Diminished] : number 2 was normal [#3 Diminished] : number 3 was normal [#4 Diminished] : number 4 was normal [#5 Diminished] : number 5 was normal [#6 Diminished] : number 6 was normal [#7 Diminished] : number 7 was normal [#8 Diminished] : number 8 was normal [#9 Diminished] : number 9 was normal [#10 Diminished] : number 10 was normal

## 2024-01-15 LAB
25(OH)D3 SERPL-MCNC: 26.7 NG/ML
ALBUMIN SERPL ELPH-MCNC: 4.9 G/DL
ALP BLD-CCNC: 97 U/L
ALT SERPL-CCNC: 27 U/L
ANION GAP SERPL CALC-SCNC: 16 MMOL/L
APPEARANCE: CLEAR
AST SERPL-CCNC: 18 U/L
BACTERIA: NEGATIVE /HPF
BASOPHILS # BLD AUTO: 0.06 K/UL
BASOPHILS NFR BLD AUTO: 0.8 %
BILIRUB SERPL-MCNC: 0.3 MG/DL
BILIRUBIN URINE: NEGATIVE
BLOOD URINE: NEGATIVE
BUN SERPL-MCNC: 32 MG/DL
CALCIUM SERPL-MCNC: 11.8 MG/DL
CALCIUM SERPL-MCNC: 11.8 MG/DL
CAST: 0 /LPF
CHLORIDE SERPL-SCNC: 98 MMOL/L
CHOLEST SERPL-MCNC: 181 MG/DL
CO2 SERPL-SCNC: 20 MMOL/L
COLOR: YELLOW
CREAT SERPL-MCNC: 1.38 MG/DL
CREAT SPEC-SCNC: 84 MG/DL
EGFR: 55 ML/MIN/1.73M2
EOSINOPHIL # BLD AUTO: 0.41 K/UL
EOSINOPHIL NFR BLD AUTO: 5.6 %
EPITHELIAL CELLS: 0 /HPF
ESTIMATED AVERAGE GLUCOSE: 203 MG/DL
GLUCOSE QUALITATIVE U: >=1000 MG/DL
GLUCOSE SERPL-MCNC: 244 MG/DL
HBA1C MFR BLD HPLC: 8.7 %
HCT VFR BLD CALC: 46.1 %
HDLC SERPL-MCNC: 32 MG/DL
HGB BLD-MCNC: 15.7 G/DL
IMM GRANULOCYTES NFR BLD AUTO: 0.4 %
KETONES URINE: NEGATIVE MG/DL
LDLC SERPL CALC-MCNC: 68 MG/DL
LEUKOCYTE ESTERASE URINE: NEGATIVE
LYMPHOCYTES # BLD AUTO: 2.29 K/UL
LYMPHOCYTES NFR BLD AUTO: 31.5 %
MAN DIFF?: NORMAL
MCHC RBC-ENTMCNC: 30.4 PG
MCHC RBC-ENTMCNC: 34.1 GM/DL
MCV RBC AUTO: 89.3 FL
MICROALBUMIN 24H UR DL<=1MG/L-MCNC: <1.2 MG/DL
MICROALBUMIN/CREAT 24H UR-RTO: NORMAL MG/G
MICROSCOPIC-UA: NORMAL
MONOCYTES # BLD AUTO: 0.53 K/UL
MONOCYTES NFR BLD AUTO: 7.3 %
NEUTROPHILS # BLD AUTO: 3.96 K/UL
NEUTROPHILS NFR BLD AUTO: 54.4 %
NITRITE URINE: NEGATIVE
NONHDLC SERPL-MCNC: 149 MG/DL
PARATHYROID HORMONE INTACT: 102 PG/ML
PH URINE: 5.5
PLATELET # BLD AUTO: 286 K/UL
POTASSIUM SERPL-SCNC: 4.7 MMOL/L
PROT SERPL-MCNC: 7.6 G/DL
PROTEIN URINE: NEGATIVE MG/DL
PSA SERPL-MCNC: 1.42 NG/ML
RBC # BLD: 5.16 M/UL
RBC # FLD: 12.5 %
RED BLOOD CELLS URINE: 0 /HPF
SODIUM SERPL-SCNC: 134 MMOL/L
SPECIFIC GRAVITY URINE: >1.03
TRIGL SERPL-MCNC: 524 MG/DL
URATE SERPL-MCNC: 4.8 MG/DL
UROBILINOGEN URINE: 0.2 MG/DL
VIT B12 SERPL-MCNC: 484 PG/ML
WBC # FLD AUTO: 7.28 K/UL
WHITE BLOOD CELLS URINE: 0 /HPF

## 2024-01-19 ENCOUNTER — TRANSCRIPTION ENCOUNTER (OUTPATIENT)
Age: 69
End: 2024-01-19

## 2024-01-19 LAB
CREAT 24H UR-MCNC: 1.6 G/24 H
CREAT ?TM UR-MCNC: 60 MG/DL
PROT 24H UR-MRATE: 9 MG/DL
PROT ?TM UR-MCNC: 24 HR
PROT UR-MCNC: 236 MG/24 H
SPECIMEN VOL 24H UR: 2625 ML

## 2024-02-05 ENCOUNTER — APPOINTMENT (OUTPATIENT)
Dept: INTERNAL MEDICINE | Facility: CLINIC | Age: 69
End: 2024-02-05
Payer: MEDICARE

## 2024-02-05 VITALS
RESPIRATION RATE: 13 BRPM | WEIGHT: 182 LBS | DIASTOLIC BLOOD PRESSURE: 80 MMHG | SYSTOLIC BLOOD PRESSURE: 124 MMHG | BODY MASS INDEX: 26.05 KG/M2 | HEIGHT: 70 IN | HEART RATE: 88 BPM | OXYGEN SATURATION: 98 %

## 2024-02-05 DIAGNOSIS — Z11.59 ENCOUNTER FOR SCREENING FOR OTHER VIRAL DISEASES: ICD-10-CM

## 2024-02-05 DIAGNOSIS — A08.4 VIRAL INTESTINAL INFECTION, UNSPECIFIED: ICD-10-CM

## 2024-02-05 PROCEDURE — 99213 OFFICE O/P EST LOW 20 MIN: CPT

## 2024-02-05 PROCEDURE — 36415 COLL VENOUS BLD VENIPUNCTURE: CPT

## 2024-02-05 PROCEDURE — G2211 COMPLEX E/M VISIT ADD ON: CPT

## 2024-02-05 NOTE — ASSESSMENT
[FreeTextEntry1] : Venipuncture done in our office, Labs sent out.  Viral swab sent.  BRAT diet advised/fluids.  Patient will call if symptoms continue or worsen.  Discussed stool studies for recurrent/persistent diarrhea.  Patient will return here as scheduled for regular follow-up   Dr. Mcgrath was present in office building while I examined patient

## 2024-02-05 NOTE — HISTORY OF PRESENT ILLNESS
[FreeTextEntry8] : Patient presents stating on Friday he vomited at least 10 times.  Patient states on Saturday he started with diarrhea that persisted until Sunday.  Patient states today is the first day he has no vomiting or diarrhea but is not eating much.  Patient denies abdominal pain/fever/sick symptoms.  Patient is questioning if he has COVID, no known exposure and no other sick symptoms.

## 2024-02-05 NOTE — PHYSICAL EXAM
[No Acute Distress] : no acute distress [No Respiratory Distress] : no respiratory distress  [Normal Rate] : normal rate  [Clear to Auscultation] : lungs were clear to auscultation bilaterally [Regular Rhythm] : with a regular rhythm [Normal Affect] : the affect was normal [Normal Mood] : the mood was normal [Soft] : abdomen soft [Non Tender] : non-tender [Normal Bowel Sounds] : normal bowel sounds [de-identified] : No guarding/rebound

## 2024-02-07 LAB
ALBUMIN SERPL ELPH-MCNC: 4.6 G/DL
ALP BLD-CCNC: 77 U/L
ALT SERPL-CCNC: 22 U/L
ANION GAP SERPL CALC-SCNC: 21 MMOL/L
AST SERPL-CCNC: 21 U/L
BASOPHILS # BLD AUTO: 0.02 K/UL
BASOPHILS NFR BLD AUTO: 0.4 %
BILIRUB SERPL-MCNC: 0.8 MG/DL
BUN SERPL-MCNC: 29 MG/DL
CALCIUM SERPL-MCNC: 10.8 MG/DL
CHLORIDE SERPL-SCNC: 94 MMOL/L
CO2 SERPL-SCNC: 16 MMOL/L
CREAT SERPL-MCNC: 1.38 MG/DL
EGFR: 55 ML/MIN/1.73M2
EOSINOPHIL # BLD AUTO: 0.13 K/UL
EOSINOPHIL NFR BLD AUTO: 2.8 %
GLUCOSE SERPL-MCNC: 234 MG/DL
HCT VFR BLD CALC: 47.8 %
HGB BLD-MCNC: 16.2 G/DL
IMM GRANULOCYTES NFR BLD AUTO: 0.2 %
INFLUENZA A RESULT: NOT DETECTED
INFLUENZA B RESULT: NOT DETECTED
LYMPHOCYTES # BLD AUTO: 0.95 K/UL
LYMPHOCYTES NFR BLD AUTO: 20.7 %
MAN DIFF?: NORMAL
MCHC RBC-ENTMCNC: 29.7 PG
MCHC RBC-ENTMCNC: 33.9 GM/DL
MCV RBC AUTO: 87.5 FL
MONOCYTES # BLD AUTO: 0.5 K/UL
MONOCYTES NFR BLD AUTO: 10.9 %
NEUTROPHILS # BLD AUTO: 2.98 K/UL
NEUTROPHILS NFR BLD AUTO: 65 %
PLATELET # BLD AUTO: 192 K/UL
POTASSIUM SERPL-SCNC: 4.5 MMOL/L
PROT SERPL-MCNC: 7.3 G/DL
RBC # BLD: 5.46 M/UL
RBC # FLD: 13 %
RESP SYN VIRUS RESULT: NOT DETECTED
SARS-COV-2 RESULT: NOT DETECTED
SODIUM SERPL-SCNC: 132 MMOL/L
WBC # FLD AUTO: 4.59 K/UL

## 2024-02-11 ENCOUNTER — RX RENEWAL (OUTPATIENT)
Age: 69
End: 2024-02-11

## 2024-02-11 RX ORDER — PANTOPRAZOLE 40 MG/1
40 TABLET, DELAYED RELEASE ORAL
Qty: 90 | Refills: 1 | Status: ACTIVE | COMMUNITY
Start: 2017-05-24 | End: 1900-01-01

## 2024-03-07 ENCOUNTER — RX RENEWAL (OUTPATIENT)
Age: 69
End: 2024-03-07

## 2024-03-15 NOTE — REVIEW OF SYSTEMS
"    3/15/2024         RE: Mindy Guardado  [address] [place] [address]        Dear Colleague,    Thank you for referring your patient, Mindy Guardado, to the [facility]. Please see a copy of my visit note below.      ENDOCRINOLOGY NEW PATIENT VISIT        HISTORY OF PRESENT ILLNESS    Mindy Guardado is seen in consultation at the request of Ramya Guardado NP for adrenal mass.    Patient was seen in the ED on 5/10/2023 with flank pain.  Imaging by CT of the abdomen revealed a right proximal ureteral stone with hydronephrosis as well as incidental finding of bilateral adrenal masses.  On that study, left adrenal contained a 4.1 cm heterogenous mass (this appears enlarged compared to 3.5 cm mass previously noted in 2012); right adrenal contained as a new 1.3 cm as well 3.4 cm heterogenous mass.  Also noted were uterine fibroids.    Patient was referred for dedicated MRI of the adrenals, performed on 6/15/2023.  -2.9 x 2.7 cm mass in the right adrenal gland which demonstrates signal dropout on the out of phase images.   -3.7 x 3.1 cm left adrenal mass which also demonstrates dropout of signal on the out of phase images. These lesions demonstrate minimal enhancement on postcontrast imaging. Additionally, the left adrenal mass has a nonenhancing T2 bright portion measuring 0.8 x 1.1 cm. Adrenal imaging findings most compatible with adenomas.  -Other findings: Uterine fibroids. Partially imaged 1.3 cm medial right hepatic lobe mass as well as a partially imaged 1.1 cm mass in the posterior right hepatic lobe. These were not imaged on the postcontrast images but could reflect hemangiomas. Left hepatic lobe cyst. Normal gallbladder.    CT of the abdomen performed in the [facility] system on 3/20/2012 showed the following: \"Stable (compared to 2/2012) low density left adrenal nodule measures 3.2 x 2.5 x 3 cm in maximal AP, transverse and craniocaudal dimensions. This is low density " "measuring approximately -5 Hounsfield units and has imaging characteristics most compatible with and adrenal adenoma. Right adrenal gland normal.\"    The patient had plasma catecholamines and cortisol level checked after urology visit in 2023, with results as detailed below.    The patient does not have history of diabetes or hypertension.    She has not had significant weight gain in the past few years, in fact she notes that she has had weight loss of about 16 pounds in the past few months without change in diet.  Her weight records do not reflect this but we also did not have records of weights for patient between 2023 and 2024.    No purple striae.  Has had hirsutism which has been more pronounced for the past year: Shaves above the upper lip and on the chin daily.    She has had increasing headaches which are occurring almost daily.  Otherwise, no spells of palpitations.    No history of hypertension or hyperglycemia.  No history of hypokalemia.    Has been amenorrheic since around age 46.  Has had significant hot flashes since then, often has drenching sweats at night.  Prior to menopause, menstrual cycles occurred regularly every month.    Patient is , with 1 pregnancy ending electively at another in miscarriage.  No history of issues with fertility.    No known family history of adrenal nodules.  No known family history of congenital adrenal hyperplasia.    Other pertinent medical history:  -New development of exertional dyspnea, evaluated in cardiology in 2024 with discovery of left bundle branch block; referred for echocardiogram and coronary CT.  Echocardiogram performed on 2024 showed EF of 50 to 55% with abnormal septal motion likely related to altered electrical activation due to bundle branch block.  -History calcium oxalate ureteral stone, 1 instance of renal colic; followed in urology.    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    PAST MEDICAL HISTORY  Past Medical History:   Diagnosis Date     PONV (postoperative nausea and vomiting)      Renal disease        MEDICATIONS  Current Outpatient Medications   Medication Sig Dispense Refill     albuterol (PROAIR HFA) 108 (90 Base) MCG/ACT inhaler Inhale 2 puffs into the lungs every 4 hours as needed for shortness of breath, wheezing or cough (Patient not taking: Reported on 2/22/2024) 8 g 0     UNABLE TO FIND Take 1 teaspoonful by mouth every 4 hours as needed MEDICATION NAME: [place]in (Patient not taking: Reported on 3/15/2024)         Allergies, family, and social history were reviewed and documented as needed in EHR.     REVIEW OF SYSTEMS  A complete 10-point ROS was performed and pertinent positives and negatives are noted in the HPI. ROS is also positive for the following: cramping abdominal pain, followed by loose \"paste\"-like stools occurring for the past 6 years, as often as 4-5 times a month.    PHYSICAL EXAM  /78 (BP Location: Right arm, Patient Position: Sitting, Cuff Size: Adult Regular)   Pulse 83   Wt 74.8 kg (164 lb 14.4 oz)   SpO2 100%   BMI 30.16 kg/m    Body mass index is 30.16 kg/m .  Constitutional: Vital signs reviewed, as recorded above. Patient is alert, oriented and appears in no acute distress.  Eyes: PER, EOMI, no stare, lid lag, or retraction; no conjunctival injection.  ENMT: OP clear with moist MM. Lips are without lesions.   Neck: Neck supple, no palpable thyromegaly.  Lymphatic: No cervical or supraclavicular LAD.  Cardiovascular: RRR, normal S1/S2, no audible murmurs, rubs or gallops; DP pulses present and symmetric.  Respiratory: CTAB, without wheezes, crackles or rhonchi; normal chest wall motion and respiratory effort.  GI: Positive bowel sounds, soft, NT/ND.  MSK: No clubbing or cyanosis; normal muscle bulk and tone.  Skin: Normal skin color, temperature, turgor and texture, no purple striae.  Neurological: Alert and " oriented times 3. No tremor.      DATA REVIEW  Each of the following laboratory and/or imaging studies were reviewed.    Prior imaging as in HPI.            ASSESSMENT  1.  Bilateral adrenal masses.  Differential includes bilateral nonfunctioning adenomas, functional adenoma(s), PBMAH, less likely pheochromocytoma or CAH.  I reviewed images of CT and MRI studies from 2023 personally.  CT phenotype cannot be completely assessed in study from 5/2023 since it is a contrast study.  However, adrenal masses appear mildly heterogenous and well-circumscribed.  MRI features are suggestive of adrenal adenomas.  MRI phenotype as well as presence of left adrenal mass on prior study in 2012 (although left adrenal mass is slightly more prominent on recent study) supports benignity.  Would evaluate for functionality.  Discussed differential diagnosis and proposed testing in detail.  If hormonal studies are unremarkable, we will anticipate follow-up in the summer 2024, with CT adrenal protocol to be completed prior to visit.    2.  Loose stools, weight loss and heat intolerance/night sweats.  Would screen for hyperthyroidism.  Has had evaluation in GI and, per patient, normal colonoscopy.    3.  Hirsutism.  Relatively newer onset.  As above, evaluate adrenal androgens.  Also check testosterone levels.    PLAN  -Labs today  -Perform the dexamethasone suppression test as follows:  1. Take 1 mg dexamethasone tablet at 11 PM.  2. The next morning, go to lab to have blood draw at 8 AM. You should arrive in lab by 7:45 AM to be sure your blood is drawn by 8 AM.  The prescription for dexamethasone will be sent to pharmacy.  -Return for a follow-up visit in summer 2024  -We will communicate results via [employer], or if needed by phone      Orders Placed This Encounter   Procedures     Adrenal corticotropin     Cortisol     TSH     T4 free     Metanephrines Plasma Free     Aldosterone urine     DHEA sulfate     Testosterone total      Aldosterone Renin Ratio       I spent a total of 74 minutes on the date of encounter reviewing medical records, evaluating the patient, coordinating care and documenting in the EHR, as detailed above.      Keo Glass MD   Division of Diabetes, Endocrinology and Metabolism  Department of Medicine      cc: Ramya Guardado NP           Again, thank you for allowing me to participate in the care of your patient.        Sincerely,        Keo Glass MD   [As Noted in HPI] : as noted in HPI [Negative] : Heme/Lymph

## 2024-03-29 ENCOUNTER — APPOINTMENT (OUTPATIENT)
Dept: ENDOCRINOLOGY | Facility: CLINIC | Age: 69
End: 2024-03-29

## 2024-04-06 ENCOUNTER — RX RENEWAL (OUTPATIENT)
Age: 69
End: 2024-04-06

## 2024-04-18 ENCOUNTER — APPOINTMENT (OUTPATIENT)
Dept: INTERNAL MEDICINE | Facility: CLINIC | Age: 69
End: 2024-04-18
Payer: MEDICARE

## 2024-04-18 VITALS
SYSTOLIC BLOOD PRESSURE: 125 MMHG | DIASTOLIC BLOOD PRESSURE: 80 MMHG | OXYGEN SATURATION: 98 % | HEART RATE: 90 BPM | RESPIRATION RATE: 13 BRPM | WEIGHT: 188 LBS | HEIGHT: 70 IN | BODY MASS INDEX: 26.92 KG/M2

## 2024-04-18 DIAGNOSIS — I10 ESSENTIAL (PRIMARY) HYPERTENSION: ICD-10-CM

## 2024-04-18 DIAGNOSIS — M10.9 GOUT, UNSPECIFIED: ICD-10-CM

## 2024-04-18 DIAGNOSIS — E11.9 TYPE 2 DIABETES MELLITUS W/OUT COMPLICATIONS: ICD-10-CM

## 2024-04-18 DIAGNOSIS — E55.9 VITAMIN D DEFICIENCY, UNSPECIFIED: ICD-10-CM

## 2024-04-18 DIAGNOSIS — E21.3 HYPERPARATHYROIDISM, UNSPECIFIED: ICD-10-CM

## 2024-04-18 DIAGNOSIS — E78.5 HYPERLIPIDEMIA, UNSPECIFIED: ICD-10-CM

## 2024-04-18 DIAGNOSIS — Z79.899 OTHER LONG TERM (CURRENT) DRUG THERAPY: ICD-10-CM

## 2024-04-18 PROCEDURE — 99214 OFFICE O/P EST MOD 30 MIN: CPT

## 2024-04-18 PROCEDURE — 36415 COLL VENOUS BLD VENIPUNCTURE: CPT

## 2024-04-18 PROCEDURE — G2211 COMPLEX E/M VISIT ADD ON: CPT

## 2024-04-18 RX ORDER — GLIMEPIRIDE 2 MG/1
2 TABLET ORAL
Qty: 90 | Refills: 1 | Status: ACTIVE | COMMUNITY
Start: 2022-07-25 | End: 1900-01-01

## 2024-04-18 NOTE — HISTORY OF PRESENT ILLNESS
[FreeTextEntry1] : Patient presents for followup on hypertension/hyperlipidemia/T2DM. Patient is currently fasting for today's labs. Patient is currently on Diovan/toprol for hypertension, on Crestor for hyperlipidemia and is On jardiance/amaryl for type 2 diabetes -got covid vaccines  -Patient saw head and neck specialist who recommended biopsy which patient adamantly declines, he is overdue for endocrinology follow-up regarding hyperparathyroidism "will schedule"

## 2024-04-18 NOTE — ASSESSMENT
[FreeTextEntry1] : Venipuncture done in our office, Labs sent out/ further recommendations will be made based on lab results. Patient advised continue present medications with diet/exercise and specialist followup. Patient will return to the office in 3-4months   last colonoscopy was 5/2023 -next in 5 years Last bone density was 2/2023=normal specialists include 1. Orthopedic prn-Dr. Parekh/Dr. Macdonald 2. Dermatology-Dr. Terry 3. [id number] call for report 4. Head and neck MD-Dr. Aranda 5.GI- 6.Cardio-Dr. Iyer 7.ENT- Dr. Solomon 8. Parathyroid specialist/endocrine-Dr. Rushing "to do" 9.Rheum for +NOEL=declines asymptomatic vaccines are UTD, +got covid vaccines [place] 1/2024 24 hour urine=1/2024 Echo via cardio. PSA=1/2024

## 2024-04-19 LAB
25(OH)D3 SERPL-MCNC: 23.2 NG/ML
ALBUMIN SERPL ELPH-MCNC: 4.6 G/DL
ALP BLD-CCNC: 85 U/L
ALT SERPL-CCNC: 24 U/L
ANION GAP SERPL CALC-SCNC: 16 MMOL/L
AST SERPL-CCNC: 19 U/L
BASOPHILS # BLD AUTO: 0.04 K/UL
BASOPHILS NFR BLD AUTO: 0.8 %
BILIRUB SERPL-MCNC: 0.3 MG/DL
BUN SERPL-MCNC: 23 MG/DL
CALCIUM SERPL-MCNC: 11.4 MG/DL
CALCIUM SERPL-MCNC: 11.4 MG/DL
CHLORIDE SERPL-SCNC: 100 MMOL/L
CHOLEST SERPL-MCNC: 227 MG/DL
CO2 SERPL-SCNC: 19 MMOL/L
CREAT SERPL-MCNC: 1.22 MG/DL
EGFR: 64 ML/MIN/1.73M2
EOSINOPHIL # BLD AUTO: 0.24 K/UL
EOSINOPHIL NFR BLD AUTO: 4.7 %
ESTIMATED AVERAGE GLUCOSE: 206 MG/DL
GLUCOSE SERPL-MCNC: 227 MG/DL
HBA1C MFR BLD HPLC: 8.8 %
HCT VFR BLD CALC: 42.9 %
HDLC SERPL-MCNC: 31 MG/DL
HGB BLD-MCNC: 14.8 G/DL
IMM GRANULOCYTES NFR BLD AUTO: 0.4 %
LDLC SERPL CALC-MCNC: 79 MG/DL
LYMPHOCYTES # BLD AUTO: 1.53 K/UL
LYMPHOCYTES NFR BLD AUTO: 29.7 %
MAGNESIUM SERPL-MCNC: 1.7 MG/DL
MAN DIFF?: NORMAL
MCHC RBC-ENTMCNC: 30.8 PG
MCHC RBC-ENTMCNC: 34.5 GM/DL
MCV RBC AUTO: 89.2 FL
MONOCYTES # BLD AUTO: 0.32 K/UL
MONOCYTES NFR BLD AUTO: 6.2 %
NEUTROPHILS # BLD AUTO: 3.01 K/UL
NEUTROPHILS NFR BLD AUTO: 58.2 %
NONHDLC SERPL-MCNC: 197 MG/DL
PARATHYROID HORMONE INTACT: 77 PG/ML
PLATELET # BLD AUTO: 213 K/UL
POTASSIUM SERPL-SCNC: 4.7 MMOL/L
PROT SERPL-MCNC: 7.2 G/DL
RBC # BLD: 4.81 M/UL
RBC # FLD: 13.2 %
SODIUM SERPL-SCNC: 135 MMOL/L
TRIGL SERPL-MCNC: 734 MG/DL
TSH SERPL-ACNC: 1.6 UIU/ML
URATE SERPL-MCNC: 5.8 MG/DL
WBC # FLD AUTO: 5.16 K/UL

## 2024-04-22 ENCOUNTER — RX RENEWAL (OUTPATIENT)
Age: 69
End: 2024-04-22

## 2024-04-22 RX ORDER — VALSARTAN 320 MG/1
320 TABLET, COATED ORAL
Qty: 90 | Refills: 1 | Status: ACTIVE | COMMUNITY
Start: 2019-06-27 | End: 1900-01-01

## 2024-04-30 ENCOUNTER — TRANSCRIPTION ENCOUNTER (OUTPATIENT)
Age: 69
End: 2024-04-30

## 2024-04-30 RX ORDER — EMPAGLIFLOZIN 25 MG/1
25 TABLET, FILM COATED ORAL
Qty: 90 | Refills: 1 | Status: ACTIVE | COMMUNITY
Start: 2024-04-30

## 2024-04-30 RX ORDER — ICOSAPENT ETHYL 1 G/1
1 CAPSULE ORAL DAILY
Qty: 180 | Refills: 1 | Status: ACTIVE | COMMUNITY
Start: 2021-10-21

## 2024-04-30 RX ORDER — EMPAGLIFLOZIN 10 MG/1
10 TABLET, FILM COATED ORAL DAILY
Qty: 90 | Refills: 1 | Status: DISCONTINUED | COMMUNITY
Start: 2022-06-29 | End: 2024-04-30

## 2024-06-03 ENCOUNTER — RX RENEWAL (OUTPATIENT)
Age: 69
End: 2024-06-03

## 2024-07-23 ENCOUNTER — APPOINTMENT (OUTPATIENT)
Dept: INTERNAL MEDICINE | Facility: CLINIC | Age: 69
End: 2024-07-23
Payer: MEDICARE

## 2024-07-23 VITALS
BODY MASS INDEX: 27.06 KG/M2 | OXYGEN SATURATION: 98 % | SYSTOLIC BLOOD PRESSURE: 120 MMHG | DIASTOLIC BLOOD PRESSURE: 80 MMHG | WEIGHT: 189 LBS | RESPIRATION RATE: 14 BRPM | HEIGHT: 70 IN | HEART RATE: 89 BPM

## 2024-07-23 DIAGNOSIS — Z79.899 OTHER LONG TERM (CURRENT) DRUG THERAPY: ICD-10-CM

## 2024-07-23 DIAGNOSIS — E78.5 HYPERLIPIDEMIA, UNSPECIFIED: ICD-10-CM

## 2024-07-23 DIAGNOSIS — M10.9 GOUT, UNSPECIFIED: ICD-10-CM

## 2024-07-23 PROCEDURE — 99214 OFFICE O/P EST MOD 30 MIN: CPT

## 2024-07-23 PROCEDURE — 36415 COLL VENOUS BLD VENIPUNCTURE: CPT

## 2024-07-23 PROCEDURE — G2211 COMPLEX E/M VISIT ADD ON: CPT

## 2024-07-24 ENCOUNTER — TRANSCRIPTION ENCOUNTER (OUTPATIENT)
Age: 69
End: 2024-07-24

## 2024-07-24 LAB
25(OH)D3 SERPL-MCNC: 24.9 NG/ML
ALBUMIN SERPL ELPH-MCNC: 4.6 G/DL
ALP BLD-CCNC: 86 U/L
ALT SERPL-CCNC: 21 U/L
ANION GAP SERPL CALC-SCNC: 13 MMOL/L
AST SERPL-CCNC: 18 U/L
BASOPHILS # BLD AUTO: 0.05 K/UL
BASOPHILS NFR BLD AUTO: 0.9 %
BILIRUB SERPL-MCNC: 0.3 MG/DL
BUN SERPL-MCNC: 24 MG/DL
CALCIUM SERPL-MCNC: 11.4 MG/DL
CALCIUM SERPL-MCNC: 11.4 MG/DL
CHLORIDE SERPL-SCNC: 103 MMOL/L
CHOLEST SERPL-MCNC: 205 MG/DL
CO2 SERPL-SCNC: 21 MMOL/L
CREAT SERPL-MCNC: 1.41 MG/DL
EGFR: 54 ML/MIN/1.73M2
EOSINOPHIL # BLD AUTO: 0.33 K/UL
EOSINOPHIL NFR BLD AUTO: 6 %
ESTIMATED AVERAGE GLUCOSE: 197 MG/DL
GLUCOSE SERPL-MCNC: 198 MG/DL
HBA1C MFR BLD HPLC: 8.5 %
HCT VFR BLD CALC: 44.5 %
HDLC SERPL-MCNC: 25 MG/DL
HGB BLD-MCNC: 15.1 G/DL
IMM GRANULOCYTES NFR BLD AUTO: 0.2 %
LDLC SERPL CALC-MCNC: NORMAL MG/DL
LYMPHOCYTES # BLD AUTO: 1.81 K/UL
LYMPHOCYTES NFR BLD AUTO: 32.8 %
MAGNESIUM SERPL-MCNC: 1.9 MG/DL
MAN DIFF?: NORMAL
MCHC RBC-ENTMCNC: 30.7 PG
MCHC RBC-ENTMCNC: 33.9 GM/DL
MCV RBC AUTO: 90.4 FL
MONOCYTES # BLD AUTO: 0.47 K/UL
MONOCYTES NFR BLD AUTO: 8.5 %
NEUTROPHILS # BLD AUTO: 2.84 K/UL
NEUTROPHILS NFR BLD AUTO: 51.6 %
NONHDLC SERPL-MCNC: 181 MG/DL
PARATHYROID HORMONE INTACT: 68 PG/ML
PLATELET # BLD AUTO: 212 K/UL
POTASSIUM SERPL-SCNC: 4.3 MMOL/L
PROT SERPL-MCNC: 7.2 G/DL
RBC # BLD: 4.92 M/UL
RBC # FLD: 13.2 %
SODIUM SERPL-SCNC: 137 MMOL/L
TRIGL SERPL-MCNC: 840 MG/DL
TSH SERPL-ACNC: 1.7 UIU/ML
URATE SERPL-MCNC: 5.4 MG/DL
WBC # FLD AUTO: 5.51 K/UL

## 2024-07-24 NOTE — ADDENDUM
[FreeTextEntry1] : Labs show -Cholesterol profile not good?  Compliance with Crestor/Vascepa -Creatinine stable at 1.4, monitor -Calcium 11.4 with low vitamin D, again to see endocrinology and take vitamin D - diabetes uncontrolled?  Compliance with medication

## 2024-07-24 NOTE — ASSESSMENT
[FreeTextEntry1] : Venipuncture done in our office, Labs sent out/ further recommendations will be made based on lab results. Patient advised continue present medications with diet/exercise and specialist followup. Patient will return to the office in 3-4months  last colonoscopy was 5/2023 -next in 5 years Last bone density was 2/2023=normal specialists include 1. Orthopedic prn-Dr. Parekh/Dr. Macdonald 2. Dermatology-Dr. Terry 3. [id number]-KAQK-3/2024 4. Head and neck MD-Dr. Aranda 5.GI- 6.Cardio-Dr. Iyer 7.ENT- Dr. Solomon 8. Parathyroid specialist/endocrine-Dr. Rushing "to do" 9.Rheum for +NOEL=declines asymptomatic vaccines are UTD, +got covid vaccines [place] 1/2024 24 hour urine=1/2024 Echo via cardio. PSA=1/2024.

## 2024-07-24 NOTE — ASSESSMENT
[FreeTextEntry1] : Venipuncture done in our office, Labs sent out/ further recommendations will be made based on lab results. Patient advised continue present medications with diet/exercise and specialist followup. Patient will return to the office in 3-4months  last colonoscopy was 5/2023 -next in 5 years Last bone density was 2/2023=normal specialists include 1. Orthopedic prn-Dr. Parekh/Dr. Macdonald 2. Dermatology-Dr. Terry 3. Jfsqnfkbnifkz-MKGS-5/2024 4. Head and neck MD-Dr. Aranda 5.GI- 6.Cardio-Dr. Iyer 7.ENT- Dr. Solomon 8. Parathyroid specialist/endocrine-Dr. Rushing "to do" 9.Rheum for +NOEL=declines asymptomatic vaccines are UTD, +got covid vaccines [place] 1/2024 24 hour urine=1/2024 Echo via cardio. PSA=1/2024.

## 2024-07-24 NOTE — HISTORY OF PRESENT ILLNESS
[FreeTextEntry1] : Patient presents for followup on hypertension/hyperlipidemia/T2DM. Patient is currently fasting for today's labs. Patient is currently on Diovan/toprol for hypertension, on Crestor for hyperlipidemia and is On jardiance/amaryl for type 2 diabetes -got covid vaccines  -feels well

## 2024-07-25 ENCOUNTER — APPOINTMENT (OUTPATIENT)
Dept: ENDOCRINOLOGY | Facility: CLINIC | Age: 69
End: 2024-07-25
Payer: MEDICARE

## 2024-07-25 VITALS
OXYGEN SATURATION: 97 % | HEART RATE: 79 BPM | BODY MASS INDEX: 26.92 KG/M2 | DIASTOLIC BLOOD PRESSURE: 82 MMHG | HEIGHT: 70 IN | WEIGHT: 188 LBS | SYSTOLIC BLOOD PRESSURE: 120 MMHG

## 2024-07-25 DIAGNOSIS — E55.9 VITAMIN D DEFICIENCY, UNSPECIFIED: ICD-10-CM

## 2024-07-25 DIAGNOSIS — E78.1 PURE HYPERGLYCERIDEMIA: ICD-10-CM

## 2024-07-25 DIAGNOSIS — E11.9 TYPE 2 DIABETES MELLITUS W/OUT COMPLICATIONS: ICD-10-CM

## 2024-07-25 DIAGNOSIS — E83.52 HYPERCALCEMIA: ICD-10-CM

## 2024-07-25 DIAGNOSIS — I10 ESSENTIAL (PRIMARY) HYPERTENSION: ICD-10-CM

## 2024-07-25 DIAGNOSIS — E21.3 HYPERPARATHYROIDISM, UNSPECIFIED: ICD-10-CM

## 2024-07-25 LAB — GLUCOSE BLDC GLUCOMTR-MCNC: 147

## 2024-07-25 PROCEDURE — 82962 GLUCOSE BLOOD TEST: CPT

## 2024-07-25 PROCEDURE — G2211 COMPLEX E/M VISIT ADD ON: CPT

## 2024-07-25 PROCEDURE — 99215 OFFICE O/P EST HI 40 MIN: CPT

## 2024-07-25 RX ORDER — FENOFIBRATE 145 MG/1
145 TABLET, COATED ORAL DAILY
Qty: 90 | Refills: 1 | Status: ACTIVE | COMMUNITY
Start: 2024-07-25 | End: 1900-01-01

## 2024-07-25 RX ORDER — METFORMIN ER 500 MG 500 MG/1
500 TABLET ORAL
Qty: 60 | Refills: 1 | Status: ACTIVE | COMMUNITY
Start: 2024-07-25 | End: 1900-01-01

## 2024-07-25 NOTE — PHYSICAL EXAM
[Alert] : alert [Healthy Appearance] : healthy appearance [No Acute Distress] : no acute distress [Well Developed] : well developed [Normal Sclera/Conjunctiva] : normal sclera/conjunctiva [EOMI] : extra ocular movement intact [No Neck Mass] : no neck mass was observed [Thyroid Not Enlarged] : the thyroid was not enlarged [No Thyroid Nodules] : no palpable thyroid nodules [No Respiratory Distress] : no respiratory distress [Clear to Auscultation] : lungs were clear to auscultation bilaterally [Normal S1, S2] : normal S1 and S2 [Normal Rate] : heart rate was normal [Regular Rhythm] : with a regular rhythm [No Edema] : no peripheral edema [Pedal Pulses Normal] : the pedal pulses are present [Not Tender] : non-tender [Not Distended] : not distended [Soft] : abdomen soft [Normal Supraclavicular Nodes] : no supraclavicular lymphadenopathy [Normal Anterior Cervical Nodes] : no anterior cervical lymphadenopathy [Normal Gait] : normal gait [No Rash] : no rash [No Skin Lesions] : no skin lesions [Normal] : normal [2+] : 2+ in the dorsalis pedis [No Tremors] : no tremors [Oriented x3] : oriented to person, place, and time [Normal Affect] : the affect was normal [Normal Insight/Judgement] : insight and judgment were intact [Normal Mood] : the mood was normal [Acanthosis Nigricans] : no acanthosis nigricans [Diminished Throughout Both Feet] : normal tactile sensation with monofilament testing throughout both feet [de-identified] : Right hand s/p carpal-tunel repair. Decreased strength.

## 2024-07-25 NOTE — HISTORY OF PRESENT ILLNESS
[FreeTextEntry1] : 68 y.o. Male with PMH of T2D (Dx 2018), Hypercalcemia, PHPT, Gout presents for follow up. Dx with PHPT in 2017. Underwent parathyroidectomy and consequent revision with Dr. Aranda but hypercalcemia did not resolve. Unclear how many glands were taken. Dr. Aranda offer him exploratory intervention to look for other locations, but patient declined.

## 2024-07-25 NOTE — ASSESSMENT
[FreeTextEntry1] : 68 y.o. Male with PMH of T2D (Dx 2018), Hypercalcemia, PHPT, Gout presents for follow up. Dx with PHPT in 2017. Underwent parathyroidectomy and consequent revision with Dr. Aranda but hypercalcemia did not resolve. Unclear how many glands were taken. Dr. Aranda offer him exploratory intervention to look for other locations, but patient declined.   DM since 2018. Could not tolerate regular Metformin du to GI symptoms  Patient was last seen in 11/2022. Today he presents with his wife. Denies acute issues. States he is nor retired and have more time for doctors visits. His A1C worsen over the last 1.5 year. He states he takes his medications regularly. Not the best diet but tries to keep diabetic diet. However, snack occasionally after dinner. Having alcoholic drinks 1-2 x week.    Currently on: Jardiance 25 mg daily Glimepiride 2 mg daily  # DM A1C worsen to 8.5%<== 6.9<== 7.9% Does not check SMBG due to very sensitive fingers Dustin increase Glimepiride to 2 mg BID.  Will try low dose of MFN but  mg daily. If tolerates will prescribe more. Continue Jardiance 25 mg daily (ask to take in AM) Continue physical activities with walks at least 30 min 3 x week  # PHPT s/p parathyroidectomy, unsuccessful as per the patient.  Reports being previously on ?Sensipar but unable to tolerate it Most recent Ca+ 10.9 corrected with PTH 68, Vit D 24 Renal US in11/2/22 - negative for renal stones Last DEXA 2/2023 - normal BMD Advised to continue keeping good hydration with at least 6 to 8 glasses of water daily. Keep normal Ca diet. Can continue Vit D3 2000 IU daily. F/u labs next visit + 24h Urine Ca collection  #HLD, likely familial TRG remain significantly elevated 840. Unable to calculate LDL Patient has been declining treatment.  Discussed again starting Fibrates and patient agree with the plan Start Fenofibrate 145 mg daily  Continue Rosuvastatin  Advised keeping low fat/cholesterol diet  F/u with NP in 3 months F/u with me in 6 months.

## 2024-08-09 ENCOUNTER — RX RENEWAL (OUTPATIENT)
Age: 69
End: 2024-08-09

## 2024-08-13 NOTE — HISTORY OF PRESENT ILLNESS
Signed, thank you   [FreeTextEntry8] : Patient presents complaining of cough for about 1 week.  Patient reports home COVID test as negative.  Patient has tried OTC medications including DayQuil without benefit.  Patient states his nose is running, he has postnasal drip and has cough.  Patient denies fever/dyspnea

## 2024-09-05 NOTE — ASU PREOP CHECKLIST - SPO2 (%)
[de-identified] : Pt is referred by John Ramos for hoarseness with VC edema and worsen with hoarseness. 8/31/2023 ct of neck 9 x 7 x 11 mm mass along the right vocal cord. 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 Pt is completed RT with Dr. Desai on 12/21/2023. last ct of neck and chest 4/2024 MELANI. Pt states voice change a bit, morehoarsen secondary to mucus stuck to the throat and coughing more for the past 2 weeks.  smoke 5-6 cig /day smoker- 1/2 ppd for 30 yrs  89

## 2024-10-11 ENCOUNTER — RX RENEWAL (OUTPATIENT)
Age: 69
End: 2024-10-11

## 2024-10-21 ENCOUNTER — RX RENEWAL (OUTPATIENT)
Age: 69
End: 2024-10-21

## 2024-10-28 ENCOUNTER — APPOINTMENT (OUTPATIENT)
Dept: INTERNAL MEDICINE | Facility: CLINIC | Age: 69
End: 2024-10-28
Payer: MEDICARE

## 2024-10-28 VITALS
HEIGHT: 70 IN | OXYGEN SATURATION: 97 % | SYSTOLIC BLOOD PRESSURE: 130 MMHG | HEART RATE: 82 BPM | BODY MASS INDEX: 26.05 KG/M2 | RESPIRATION RATE: 14 BRPM | DIASTOLIC BLOOD PRESSURE: 80 MMHG | WEIGHT: 182 LBS

## 2024-10-28 DIAGNOSIS — E78.5 HYPERLIPIDEMIA, UNSPECIFIED: ICD-10-CM

## 2024-10-28 DIAGNOSIS — Z23 ENCOUNTER FOR IMMUNIZATION: ICD-10-CM

## 2024-10-28 DIAGNOSIS — Z79.899 OTHER LONG TERM (CURRENT) DRUG THERAPY: ICD-10-CM

## 2024-10-28 DIAGNOSIS — E11.9 TYPE 2 DIABETES MELLITUS W/OUT COMPLICATIONS: ICD-10-CM

## 2024-10-28 DIAGNOSIS — I10 ESSENTIAL (PRIMARY) HYPERTENSION: ICD-10-CM

## 2024-10-28 DIAGNOSIS — M10.9 GOUT, UNSPECIFIED: ICD-10-CM

## 2024-10-28 DIAGNOSIS — E55.9 VITAMIN D DEFICIENCY, UNSPECIFIED: ICD-10-CM

## 2024-10-28 DIAGNOSIS — Z98.61 ATHEROSCLEROTIC HEART DISEASE OF NATIVE CORONARY ARTERY W/OUT ANGINA PECTORIS: ICD-10-CM

## 2024-10-28 DIAGNOSIS — I25.10 ATHEROSCLEROTIC HEART DISEASE OF NATIVE CORONARY ARTERY W/OUT ANGINA PECTORIS: ICD-10-CM

## 2024-10-28 DIAGNOSIS — E21.3 HYPERPARATHYROIDISM, UNSPECIFIED: ICD-10-CM

## 2024-10-28 PROCEDURE — G2211 COMPLEX E/M VISIT ADD ON: CPT

## 2024-10-28 PROCEDURE — 90662 IIV NO PRSV INCREASED AG IM: CPT

## 2024-10-28 PROCEDURE — 36415 COLL VENOUS BLD VENIPUNCTURE: CPT

## 2024-10-28 PROCEDURE — G0008: CPT

## 2024-10-28 PROCEDURE — 99214 OFFICE O/P EST MOD 30 MIN: CPT

## 2024-10-29 ENCOUNTER — TRANSCRIPTION ENCOUNTER (OUTPATIENT)
Age: 69
End: 2024-10-29

## 2024-10-29 LAB
25(OH)D3 SERPL-MCNC: 28.6 NG/ML
ALBUMIN SERPL ELPH-MCNC: 4.6 G/DL
ALP BLD-CCNC: 76 U/L
ALT SERPL-CCNC: 26 U/L
ANION GAP SERPL CALC-SCNC: 15 MMOL/L
AST SERPL-CCNC: 19 U/L
BASOPHILS # BLD AUTO: 0.05 K/UL
BASOPHILS NFR BLD AUTO: 1.1 %
BILIRUB SERPL-MCNC: 0.3 MG/DL
BUN SERPL-MCNC: 32 MG/DL
CALCIUM SERPL-MCNC: 11.6 MG/DL
CALCIUM SERPL-MCNC: 11.6 MG/DL
CHLORIDE SERPL-SCNC: 101 MMOL/L
CHOLEST SERPL-MCNC: 185 MG/DL
CO2 SERPL-SCNC: 22 MMOL/L
CREAT SERPL-MCNC: 1.64 MG/DL
EGFR: 44 ML/MIN/1.73M2
EOSINOPHIL # BLD AUTO: 0.33 K/UL
EOSINOPHIL NFR BLD AUTO: 7.1 %
ESTIMATED AVERAGE GLUCOSE: 200 MG/DL
GLUCOSE SERPL-MCNC: 203 MG/DL
HBA1C MFR BLD HPLC: 8.6 %
HCT VFR BLD CALC: 44.9 %
HDLC SERPL-MCNC: 24 MG/DL
HGB BLD-MCNC: 14.9 G/DL
IMM GRANULOCYTES NFR BLD AUTO: 0.2 %
LDLC SERPL CALC-MCNC: 66 MG/DL
LYMPHOCYTES # BLD AUTO: 1.79 K/UL
LYMPHOCYTES NFR BLD AUTO: 38.5 %
MAGNESIUM SERPL-MCNC: 2 MG/DL
MAN DIFF?: NORMAL
MCHC RBC-ENTMCNC: 30.6 PG
MCHC RBC-ENTMCNC: 33.2 GM/DL
MCV RBC AUTO: 92.2 FL
MONOCYTES # BLD AUTO: 0.44 K/UL
MONOCYTES NFR BLD AUTO: 9.5 %
NEUTROPHILS # BLD AUTO: 2.03 K/UL
NEUTROPHILS NFR BLD AUTO: 43.6 %
NONHDLC SERPL-MCNC: 161 MG/DL
PARATHYROID HORMONE INTACT: 88 PG/ML
PLATELET # BLD AUTO: 231 K/UL
POTASSIUM SERPL-SCNC: 4.6 MMOL/L
PROT SERPL-MCNC: 7.1 G/DL
RBC # BLD: 4.87 M/UL
RBC # FLD: 12.8 %
SODIUM SERPL-SCNC: 137 MMOL/L
TRIGL SERPL-MCNC: 617 MG/DL
TSH SERPL-ACNC: 1.72 UIU/ML
URATE SERPL-MCNC: 6.1 MG/DL
WBC # FLD AUTO: 4.65 K/UL

## 2024-11-01 ENCOUNTER — NON-APPOINTMENT (OUTPATIENT)
Age: 69
End: 2024-11-01

## 2024-11-07 ENCOUNTER — TRANSCRIPTION ENCOUNTER (OUTPATIENT)
Age: 69
End: 2024-11-07

## 2024-11-12 ENCOUNTER — APPOINTMENT (OUTPATIENT)
Dept: ENDOCRINOLOGY | Facility: CLINIC | Age: 69
End: 2024-11-12
Payer: MEDICARE

## 2024-11-12 VITALS
HEIGHT: 70 IN | OXYGEN SATURATION: 97 % | SYSTOLIC BLOOD PRESSURE: 144 MMHG | DIASTOLIC BLOOD PRESSURE: 78 MMHG | HEART RATE: 87 BPM | WEIGHT: 192 LBS | BODY MASS INDEX: 27.49 KG/M2

## 2024-11-12 DIAGNOSIS — E78.5 HYPERLIPIDEMIA, UNSPECIFIED: ICD-10-CM

## 2024-11-12 DIAGNOSIS — E78.1 PURE HYPERGLYCERIDEMIA: ICD-10-CM

## 2024-11-12 DIAGNOSIS — R80.9 PROTEINURIA, UNSPECIFIED: ICD-10-CM

## 2024-11-12 DIAGNOSIS — E21.3 HYPERPARATHYROIDISM, UNSPECIFIED: ICD-10-CM

## 2024-11-12 DIAGNOSIS — E11.9 TYPE 2 DIABETES MELLITUS W/OUT COMPLICATIONS: ICD-10-CM

## 2024-11-12 DIAGNOSIS — E83.52 HYPERCALCEMIA: ICD-10-CM

## 2024-11-12 DIAGNOSIS — M85.80 OTHER SPECIFIED DISORDERS OF BONE DENSITY AND STRUCTURE, UNSPECIFIED SITE: ICD-10-CM

## 2024-11-12 DIAGNOSIS — E55.9 VITAMIN D DEFICIENCY, UNSPECIFIED: ICD-10-CM

## 2024-11-12 LAB — GLUCOSE BLDC GLUCOMTR-MCNC: 186

## 2024-11-12 PROCEDURE — 99214 OFFICE O/P EST MOD 30 MIN: CPT

## 2024-11-12 PROCEDURE — 82962 GLUCOSE BLOOD TEST: CPT

## 2024-11-12 PROCEDURE — G2211 COMPLEX E/M VISIT ADD ON: CPT

## 2024-11-12 RX ORDER — BLOOD-GLUCOSE SENSOR
EACH MISCELLANEOUS
Qty: 2 | Refills: 5 | Status: ACTIVE | COMMUNITY
Start: 2024-11-12 | End: 1900-01-01

## 2024-11-20 ENCOUNTER — APPOINTMENT (OUTPATIENT)
Dept: INTERNAL MEDICINE | Facility: CLINIC | Age: 69
End: 2024-11-20
Payer: MEDICARE

## 2024-11-20 VITALS
OXYGEN SATURATION: 98 % | SYSTOLIC BLOOD PRESSURE: 126 MMHG | RESPIRATION RATE: 13 BRPM | WEIGHT: 195 LBS | BODY MASS INDEX: 27.98 KG/M2 | HEART RATE: 76 BPM | DIASTOLIC BLOOD PRESSURE: 80 MMHG

## 2024-11-20 DIAGNOSIS — I10 ESSENTIAL (PRIMARY) HYPERTENSION: ICD-10-CM

## 2024-11-20 DIAGNOSIS — R79.89 OTHER SPECIFIED ABNORMAL FINDINGS OF BLOOD CHEMISTRY: ICD-10-CM

## 2024-11-20 DIAGNOSIS — Z80.8 FAMILY HISTORY OF MALIGNANT NEOPLASM OF OTHER ORGANS OR SYSTEMS: ICD-10-CM

## 2024-11-20 PROCEDURE — 99213 OFFICE O/P EST LOW 20 MIN: CPT

## 2024-11-20 PROCEDURE — G2211 COMPLEX E/M VISIT ADD ON: CPT

## 2024-11-20 PROCEDURE — 36415 COLL VENOUS BLD VENIPUNCTURE: CPT

## 2024-11-21 LAB
ANION GAP SERPL CALC-SCNC: 11 MMOL/L
BUN SERPL-MCNC: 36 MG/DL
CALCIUM SERPL-MCNC: 12.7 MG/DL
CHLORIDE SERPL-SCNC: 103 MMOL/L
CO2 SERPL-SCNC: 23 MMOL/L
CREAT SERPL-MCNC: 1.85 MG/DL
EGFR: 38 ML/MIN/1.73M2
GLUCOSE SERPL-MCNC: 182 MG/DL
POTASSIUM SERPL-SCNC: 4.8 MMOL/L
SODIUM SERPL-SCNC: 137 MMOL/L

## 2024-11-28 ENCOUNTER — RX RENEWAL (OUTPATIENT)
Age: 69
End: 2024-11-28

## 2024-12-02 ENCOUNTER — RX RENEWAL (OUTPATIENT)
Age: 69
End: 2024-12-02

## 2024-12-09 ENCOUNTER — RX RENEWAL (OUTPATIENT)
Age: 69
End: 2024-12-09

## 2025-01-03 ENCOUNTER — APPOINTMENT (OUTPATIENT)
Dept: ULTRASOUND IMAGING | Facility: CLINIC | Age: 70
End: 2025-01-03
Payer: MEDICARE

## 2025-01-03 ENCOUNTER — OUTPATIENT (OUTPATIENT)
Dept: OUTPATIENT SERVICES | Facility: HOSPITAL | Age: 70
LOS: 1 days | End: 2025-01-03
Payer: MEDICARE

## 2025-01-03 DIAGNOSIS — Z98.89 OTHER SPECIFIED POSTPROCEDURAL STATES: Chronic | ICD-10-CM

## 2025-01-03 DIAGNOSIS — E89.2 POSTPROCEDURAL HYPOPARATHYROIDISM: Chronic | ICD-10-CM

## 2025-01-03 DIAGNOSIS — R79.89 OTHER SPECIFIED ABNORMAL FINDINGS OF BLOOD CHEMISTRY: ICD-10-CM

## 2025-01-03 PROCEDURE — 76775 US EXAM ABDO BACK WALL LIM: CPT

## 2025-01-03 PROCEDURE — 76775 US EXAM ABDO BACK WALL LIM: CPT | Mod: 26

## 2025-01-20 ENCOUNTER — RX RENEWAL (OUTPATIENT)
Age: 70
End: 2025-01-20

## 2025-02-03 ENCOUNTER — APPOINTMENT (OUTPATIENT)
Dept: ENDOCRINOLOGY | Facility: CLINIC | Age: 70
End: 2025-02-03

## 2025-03-10 ENCOUNTER — NON-APPOINTMENT (OUTPATIENT)
Age: 70
End: 2025-03-10

## 2025-03-31 ENCOUNTER — APPOINTMENT (OUTPATIENT)
Dept: INTERNAL MEDICINE | Facility: CLINIC | Age: 70
End: 2025-03-31
Payer: MEDICARE

## 2025-03-31 VITALS
RESPIRATION RATE: 14 BRPM | DIASTOLIC BLOOD PRESSURE: 80 MMHG | SYSTOLIC BLOOD PRESSURE: 130 MMHG | HEART RATE: 102 BPM | OXYGEN SATURATION: 96 % | WEIGHT: 179 LBS | HEIGHT: 70 IN | BODY MASS INDEX: 25.62 KG/M2

## 2025-03-31 DIAGNOSIS — E11.9 TYPE 2 DIABETES MELLITUS W/OUT COMPLICATIONS: ICD-10-CM

## 2025-03-31 DIAGNOSIS — R80.9 PROTEINURIA, UNSPECIFIED: ICD-10-CM

## 2025-03-31 DIAGNOSIS — Z00.00 ENCOUNTER FOR GENERAL ADULT MEDICAL EXAMINATION W/OUT ABNORMAL FINDINGS: ICD-10-CM

## 2025-03-31 DIAGNOSIS — Z12.5 ENCOUNTER FOR SCREENING FOR MALIGNANT NEOPLASM OF PROSTATE: ICD-10-CM

## 2025-03-31 DIAGNOSIS — I49.3 VENTRICULAR PREMATURE DEPOLARIZATION: ICD-10-CM

## 2025-03-31 DIAGNOSIS — M10.9 GOUT, UNSPECIFIED: ICD-10-CM

## 2025-03-31 DIAGNOSIS — D36.10 BENIGN NEOPLASM OF PERIPHERAL NERVES AND AUTONOMIC NERVOUS SYSTEM, UNSPECIFIED: ICD-10-CM

## 2025-03-31 DIAGNOSIS — I10 ESSENTIAL (PRIMARY) HYPERTENSION: ICD-10-CM

## 2025-03-31 DIAGNOSIS — E55.9 VITAMIN D DEFICIENCY, UNSPECIFIED: ICD-10-CM

## 2025-03-31 DIAGNOSIS — E83.52 HYPERCALCEMIA: ICD-10-CM

## 2025-03-31 DIAGNOSIS — E21.3 HYPERPARATHYROIDISM, UNSPECIFIED: ICD-10-CM

## 2025-03-31 DIAGNOSIS — Z98.61 ATHEROSCLEROTIC HEART DISEASE OF NATIVE CORONARY ARTERY W/OUT ANGINA PECTORIS: ICD-10-CM

## 2025-03-31 DIAGNOSIS — M85.80 OTHER SPECIFIED DISORDERS OF BONE DENSITY AND STRUCTURE, UNSPECIFIED SITE: ICD-10-CM

## 2025-03-31 DIAGNOSIS — I25.10 ATHEROSCLEROTIC HEART DISEASE OF NATIVE CORONARY ARTERY W/OUT ANGINA PECTORIS: ICD-10-CM

## 2025-03-31 DIAGNOSIS — E78.5 HYPERLIPIDEMIA, UNSPECIFIED: ICD-10-CM

## 2025-03-31 PROCEDURE — G0439: CPT

## 2025-03-31 PROCEDURE — 36415 COLL VENOUS BLD VENIPUNCTURE: CPT

## 2025-04-02 ENCOUNTER — RX RENEWAL (OUTPATIENT)
Age: 70
End: 2025-04-02

## 2025-04-02 LAB
25(OH)D3 SERPL-MCNC: 27 NG/ML
ALBUMIN SERPL ELPH-MCNC: 4.6 G/DL
ALP BLD-CCNC: 109 U/L
ALT SERPL-CCNC: 18 U/L
ANION GAP SERPL CALC-SCNC: 17 MMOL/L
APPEARANCE: CLEAR
AST SERPL-CCNC: 14 U/L
BACTERIA: NEGATIVE /HPF
BASOPHILS # BLD AUTO: 0.05 K/UL
BASOPHILS NFR BLD AUTO: 1.1 %
BILIRUB SERPL-MCNC: 0.4 MG/DL
BILIRUBIN URINE: NEGATIVE
BLOOD URINE: NEGATIVE
BUN SERPL-MCNC: 18 MG/DL
CALCIUM SERPL-MCNC: 11.5 MG/DL
CALCIUM SERPL-MCNC: 11.5 MG/DL
CAST: 0 /LPF
CHLORIDE SERPL-SCNC: 96 MMOL/L
CHOLEST SERPL-MCNC: 419 MG/DL
CO2 SERPL-SCNC: 20 MMOL/L
COLOR: YELLOW
CREAT SERPL-MCNC: 1.12 MG/DL
CREAT SPEC-SCNC: 135 MG/DL
EGFRCR SERPLBLD CKD-EPI 2021: 70 ML/MIN/1.73M2
EOSINOPHIL # BLD AUTO: 0.21 K/UL
EOSINOPHIL NFR BLD AUTO: 4.6 %
EPITHELIAL CELLS: 0 /HPF
ESTIMATED AVERAGE GLUCOSE: 266 MG/DL
GLUCOSE QUALITATIVE U: >=1000 MG/DL
GLUCOSE SERPL-MCNC: 300 MG/DL
HBA1C MFR BLD HPLC: 10.9 %
HCT VFR BLD CALC: 44.4 %
HDLC SERPL-MCNC: 26 MG/DL
HGB BLD-MCNC: 15.8 G/DL
IMM GRANULOCYTES NFR BLD AUTO: 0.4 %
KETONES URINE: NEGATIVE MG/DL
LDLC SERPL-MCNC: NORMAL MG/DL
LEUKOCYTE ESTERASE URINE: NEGATIVE
LYMPHOCYTES # BLD AUTO: 1.45 K/UL
LYMPHOCYTES NFR BLD AUTO: 32.1 %
MAGNESIUM SERPL-MCNC: 1.5 MG/DL
MAN DIFF?: NORMAL
MCHC RBC-ENTMCNC: 30.4 PG
MCHC RBC-ENTMCNC: 35.6 G/DL
MCV RBC AUTO: 85.4 FL
MICROALBUMIN 24H UR DL<=1MG/L-MCNC: 3.2 MG/DL
MICROALBUMIN/CREAT 24H UR-RTO: 24 MG/G
MICROSCOPIC-UA: NORMAL
MONOCYTES # BLD AUTO: 0.44 K/UL
MONOCYTES NFR BLD AUTO: 9.7 %
NEUTROPHILS # BLD AUTO: 2.35 K/UL
NEUTROPHILS NFR BLD AUTO: 52.1 %
NITRITE URINE: NEGATIVE
NONHDLC SERPL-MCNC: 394 MG/DL
PARATHYROID HORMONE INTACT: 61 PG/ML
PH URINE: 5.5
PHOSPHATE SERPL-MCNC: 2 MG/DL
PLATELET # BLD AUTO: 239 K/UL
POTASSIUM SERPL-SCNC: 4.2 MMOL/L
PROT SERPL-MCNC: 7.4 G/DL
PROTEIN URINE: NEGATIVE MG/DL
PSA SERPL-MCNC: 1.42 NG/ML
RBC # BLD: 5.2 M/UL
RBC # FLD: 12.2 %
RED BLOOD CELLS URINE: 0 /HPF
SODIUM SERPL-SCNC: 132 MMOL/L
SPECIFIC GRAVITY URINE: 1.03
TRIGL SERPL-MCNC: 1215 MG/DL
URATE SERPL-MCNC: 7.4 MG/DL
UROBILINOGEN URINE: 0.2 MG/DL
WBC # FLD AUTO: 4.52 K/UL
WHITE BLOOD CELLS URINE: 0 /HPF

## 2025-04-30 ENCOUNTER — RX RENEWAL (OUTPATIENT)
Age: 70
End: 2025-04-30

## 2025-05-28 ENCOUNTER — OUTPATIENT (OUTPATIENT)
Dept: OUTPATIENT SERVICES | Facility: HOSPITAL | Age: 70
LOS: 1 days | End: 2025-05-28
Payer: MEDICARE

## 2025-05-28 ENCOUNTER — APPOINTMENT (OUTPATIENT)
Dept: ULTRASOUND IMAGING | Facility: CLINIC | Age: 70
End: 2025-05-28
Payer: MEDICARE

## 2025-05-28 ENCOUNTER — APPOINTMENT (OUTPATIENT)
Dept: MRI IMAGING | Facility: CLINIC | Age: 70
End: 2025-05-28
Payer: MEDICARE

## 2025-05-28 DIAGNOSIS — Z98.89 OTHER SPECIFIED POSTPROCEDURAL STATES: Chronic | ICD-10-CM

## 2025-05-28 DIAGNOSIS — E21.3 HYPERPARATHYROIDISM, UNSPECIFIED: ICD-10-CM

## 2025-05-28 DIAGNOSIS — E89.2 POSTPROCEDURAL HYPOPARATHYROIDISM: Chronic | ICD-10-CM

## 2025-05-28 PROCEDURE — 70543 MRI ORBT/FAC/NCK W/O &W/DYE: CPT | Mod: 26

## 2025-05-28 PROCEDURE — 76536 US EXAM OF HEAD AND NECK: CPT | Mod: 26

## 2025-05-28 PROCEDURE — 70543 MRI ORBT/FAC/NCK W/O &W/DYE: CPT

## 2025-05-28 PROCEDURE — A9585: CPT

## 2025-05-28 PROCEDURE — 76536 US EXAM OF HEAD AND NECK: CPT

## 2025-06-06 ENCOUNTER — NON-APPOINTMENT (OUTPATIENT)
Age: 70
End: 2025-06-06

## 2025-06-10 NOTE — H&P PST ADULT - NS PRO AD NO ADVANCE DIRECTIVE
Triage call transferred.   Spoke with pt, f/u post [facility]  6/9/25 for ear infection. Per pt, was given ear drops, however, sx are not better, and in past with ear infection, pt would receive oral abx that would help resolve.   Informed PCP out of the office today. Offered a f/u OV with another provider today- pt declined. Per pt, will f/u with [facility] seen at yesterday.  Advised to call back if any further assistance required. No further questions or concerns. Pt verbalized understanding and agreed with POC.  
form given/Yes

## 2025-06-30 ENCOUNTER — APPOINTMENT (OUTPATIENT)
Dept: INTERNAL MEDICINE | Facility: CLINIC | Age: 70
End: 2025-06-30
Payer: MEDICARE

## 2025-06-30 VITALS
DIASTOLIC BLOOD PRESSURE: 80 MMHG | SYSTOLIC BLOOD PRESSURE: 125 MMHG | OXYGEN SATURATION: 97 % | HEIGHT: 70 IN | RESPIRATION RATE: 14 BRPM | HEART RATE: 77 BPM | WEIGHT: 183 LBS | BODY MASS INDEX: 26.2 KG/M2

## 2025-06-30 PROCEDURE — 99214 OFFICE O/P EST MOD 30 MIN: CPT

## 2025-06-30 PROCEDURE — 36415 COLL VENOUS BLD VENIPUNCTURE: CPT

## 2025-06-30 PROCEDURE — G2211 COMPLEX E/M VISIT ADD ON: CPT

## 2025-07-01 LAB
25(OH)D3 SERPL-MCNC: 28.6 NG/ML
ALBUMIN SERPL ELPH-MCNC: 4.6 G/DL
ALP BLD-CCNC: 70 U/L
ALT SERPL-CCNC: 22 U/L
ANION GAP SERPL CALC-SCNC: 15 MMOL/L
AST SERPL-CCNC: 18 U/L
BASOPHILS # BLD AUTO: 0.03 K/UL
BASOPHILS NFR BLD AUTO: 0.5 %
BILIRUB SERPL-MCNC: 0.3 MG/DL
BUN SERPL-MCNC: 39 MG/DL
CALCIUM SERPL-MCNC: 11.7 MG/DL
CALCIUM SERPL-MCNC: 11.7 MG/DL
CHLORIDE SERPL-SCNC: 104 MMOL/L
CHOLEST SERPL-MCNC: 147 MG/DL
CO2 SERPL-SCNC: 18 MMOL/L
CREAT SERPL-MCNC: 1.75 MG/DL
EGFRCR SERPLBLD CKD-EPI 2021: 41 ML/MIN/1.73M2
EOSINOPHIL # BLD AUTO: 0.29 K/UL
EOSINOPHIL NFR BLD AUTO: 5.1 %
ESTIMATED AVERAGE GLUCOSE: 192 MG/DL
GLUCOSE SERPL-MCNC: 200 MG/DL
HBA1C MFR BLD HPLC: 8.3 %
HCT VFR BLD CALC: 42 %
HDLC SERPL-MCNC: 29 MG/DL
HGB BLD-MCNC: 14.2 G/DL
IMM GRANULOCYTES NFR BLD AUTO: 0.2 %
LDLC SERPL-MCNC: 63 MG/DL
LYMPHOCYTES # BLD AUTO: 1.76 K/UL
LYMPHOCYTES NFR BLD AUTO: 31.2 %
MAGNESIUM SERPL-MCNC: 2.1 MG/DL
MAN DIFF?: NORMAL
MCHC RBC-ENTMCNC: 30.5 PG
MCHC RBC-ENTMCNC: 33.8 G/DL
MCV RBC AUTO: 90.1 FL
MONOCYTES # BLD AUTO: 0.38 K/UL
MONOCYTES NFR BLD AUTO: 6.7 %
NEUTROPHILS # BLD AUTO: 3.17 K/UL
NEUTROPHILS NFR BLD AUTO: 56.3 %
NONHDLC SERPL-MCNC: 117 MG/DL
PARATHYROID HORMONE INTACT: 67 PG/ML
PLATELET # BLD AUTO: 229 K/UL
POTASSIUM SERPL-SCNC: 4.6 MMOL/L
PROT SERPL-MCNC: 7.3 G/DL
RBC # BLD: 4.66 M/UL
RBC # FLD: 13.5 %
SODIUM SERPL-SCNC: 137 MMOL/L
TRIGL SERPL-MCNC: 350 MG/DL
TSH SERPL-ACNC: 1.86 UIU/ML
URATE SERPL-MCNC: 6.3 MG/DL
WBC # FLD AUTO: 5.64 K/UL

## 2025-07-31 ENCOUNTER — RX RENEWAL (OUTPATIENT)
Age: 70
End: 2025-07-31

## 2025-08-26 ENCOUNTER — RX RENEWAL (OUTPATIENT)
Age: 70
End: 2025-08-26

## (undated) DEVICE — STERIS DEFENDO 3-PIECE KIT (AIR/WATER, SUCTION & BIOPSY VALVES)